# Patient Record
Sex: FEMALE | Race: BLACK OR AFRICAN AMERICAN | NOT HISPANIC OR LATINO | URBAN - METROPOLITAN AREA
[De-identification: names, ages, dates, MRNs, and addresses within clinical notes are randomized per-mention and may not be internally consistent; named-entity substitution may affect disease eponyms.]

---

## 2018-10-22 ENCOUNTER — EMERGENCY (EMERGENCY)
Facility: HOSPITAL | Age: 33
LOS: 1 days | Discharge: HOME | End: 2018-10-22
Attending: EMERGENCY MEDICINE | Admitting: EMERGENCY MEDICINE
Payer: MEDICAID

## 2018-10-22 VITALS
TEMPERATURE: 98 F | RESPIRATION RATE: 18 BRPM | HEART RATE: 60 BPM | SYSTOLIC BLOOD PRESSURE: 120 MMHG | DIASTOLIC BLOOD PRESSURE: 74 MMHG | OXYGEN SATURATION: 100 %

## 2018-10-22 LAB
APPEARANCE UR: CLEAR — SIGNIFICANT CHANGE UP
BACTERIA # UR AUTO: NEGATIVE — SIGNIFICANT CHANGE UP
BILIRUB UR-MCNC: NEGATIVE — SIGNIFICANT CHANGE UP
BLD GP AB SCN SERPL QL: NEGATIVE — SIGNIFICANT CHANGE UP
BLOOD UR QL VISUAL: SIGNIFICANT CHANGE UP
COLOR SPEC: YELLOW — SIGNIFICANT CHANGE UP
GLUCOSE UR-MCNC: NEGATIVE — SIGNIFICANT CHANGE UP
HCG SERPL-ACNC: SIGNIFICANT CHANGE UP MIU/ML
HYALINE CASTS # UR AUTO: NEGATIVE — SIGNIFICANT CHANGE UP
KETONES UR-MCNC: NEGATIVE — SIGNIFICANT CHANGE UP
LEUKOCYTE ESTERASE UR-ACNC: NEGATIVE — SIGNIFICANT CHANGE UP
NITRITE UR-MCNC: NEGATIVE — SIGNIFICANT CHANGE UP
PH UR: 6.5 — SIGNIFICANT CHANGE UP (ref 5–8)
PROT UR-MCNC: 20 — SIGNIFICANT CHANGE UP
RBC CASTS # UR COMP ASSIST: SIGNIFICANT CHANGE UP (ref 0–?)
RH IG SCN BLD-IMP: POSITIVE — SIGNIFICANT CHANGE UP
SP GR SPEC: 1.03 — SIGNIFICANT CHANGE UP (ref 1–1.04)
SQUAMOUS # UR AUTO: SIGNIFICANT CHANGE UP
UROBILINOGEN FLD QL: SIGNIFICANT CHANGE UP
WBC UR QL: SIGNIFICANT CHANGE UP (ref 0–?)

## 2018-10-22 PROCEDURE — 76815 OB US LIMITED FETUS(S): CPT | Mod: 26

## 2018-10-22 PROCEDURE — 99284 EMERGENCY DEPT VISIT MOD MDM: CPT | Mod: 25

## 2018-10-22 RX ORDER — ACETAMINOPHEN 500 MG
650 TABLET ORAL ONCE
Qty: 0 | Refills: 0 | Status: COMPLETED | OUTPATIENT
Start: 2018-10-22 | End: 2018-10-22

## 2018-10-22 RX ORDER — ACETAMINOPHEN 500 MG
1000 TABLET ORAL ONCE
Qty: 0 | Refills: 0 | Status: DISCONTINUED | OUTPATIENT
Start: 2018-10-22 | End: 2018-10-26

## 2018-10-22 RX ADMIN — Medication 650 MILLIGRAM(S): at 13:08

## 2018-10-22 NOTE — ED PROCEDURE NOTE - PROCEDURE ADDITIONAL DETAILS
focused Ed ultrasound transabdominal OB to evaluate fetal well being.   uterus scanned in two planes in gray scale and m mode  fetal heart rate measured 171  impression: IUP  jackscarlos  [id number]

## 2018-10-22 NOTE — ED PROVIDER NOTE - OBJECTIVE STATEMENT
33y F  (1 previous ) currently 8 weeks pregnant with prior confirmatory US here for vaginal bleeding this morning and mild abd cramping. No fever, chills, dizziness, weakness, or dysuria

## 2018-10-23 LAB — SPECIMEN SOURCE: SIGNIFICANT CHANGE UP

## 2018-10-24 LAB — BACTERIA UR CULT: SIGNIFICANT CHANGE UP

## 2018-10-24 NOTE — ED POST DISCHARGE NOTE - RESULT SUMMARY
culture grew 3 or more types of organims which indicate collection contamination, consider recollection only if clinically indicated. Patient pregnant. No antibiotic listed in ED provider note or prescription writer at time of discharge. Patient contact # [phone] and alt # [phone] Msg's left on both #'s with Admin # and hrs.

## 2018-11-07 ENCOUNTER — EMERGENCY (EMERGENCY)
Facility: HOSPITAL | Age: 33
LOS: 1 days | Discharge: HOME | End: 2018-11-07
Admitting: EMERGENCY MEDICINE
Payer: MEDICAID

## 2018-11-07 VITALS
DIASTOLIC BLOOD PRESSURE: 76 MMHG | SYSTOLIC BLOOD PRESSURE: 115 MMHG | TEMPERATURE: 98 F | RESPIRATION RATE: 18 BRPM | HEART RATE: 67 BPM

## 2018-11-07 VITALS
SYSTOLIC BLOOD PRESSURE: 110 MMHG | HEART RATE: 68 BPM | OXYGEN SATURATION: 100 % | DIASTOLIC BLOOD PRESSURE: 76 MMHG | RESPIRATION RATE: 16 BRPM

## 2018-11-07 LAB
ALBUMIN SERPL ELPH-MCNC: 4.2 G/DL — SIGNIFICANT CHANGE UP (ref 3.3–5)
ALP SERPL-CCNC: 33 U/L — LOW (ref 40–120)
ALT FLD-CCNC: 15 U/L — SIGNIFICANT CHANGE UP (ref 4–33)
APPEARANCE UR: CLEAR — SIGNIFICANT CHANGE UP
AST SERPL-CCNC: 37 U/L — HIGH (ref 4–32)
BACTERIA # UR AUTO: SIGNIFICANT CHANGE UP
BASOPHILS # BLD AUTO: 0.03 K/UL — SIGNIFICANT CHANGE UP (ref 0–0.2)
BASOPHILS NFR BLD AUTO: 0.3 % — SIGNIFICANT CHANGE UP (ref 0–2)
BILIRUB SERPL-MCNC: 0.4 MG/DL — SIGNIFICANT CHANGE UP (ref 0.2–1.2)
BILIRUB UR-MCNC: NEGATIVE — SIGNIFICANT CHANGE UP
BLD GP AB SCN SERPL QL: NEGATIVE — SIGNIFICANT CHANGE UP
BLOOD UR QL VISUAL: NEGATIVE — SIGNIFICANT CHANGE UP
BUN SERPL-MCNC: 8 MG/DL — SIGNIFICANT CHANGE UP (ref 7–23)
CALCIUM SERPL-MCNC: 9.5 MG/DL — SIGNIFICANT CHANGE UP (ref 8.4–10.5)
CHLORIDE SERPL-SCNC: 103 MMOL/L — SIGNIFICANT CHANGE UP (ref 98–107)
CO2 SERPL-SCNC: 20 MMOL/L — LOW (ref 22–31)
COD CRY URNS QL: SIGNIFICANT CHANGE UP (ref 0–0)
COLOR SPEC: YELLOW — SIGNIFICANT CHANGE UP
CREAT SERPL-MCNC: 0.7 MG/DL — SIGNIFICANT CHANGE UP (ref 0.5–1.3)
EOSINOPHIL # BLD AUTO: 0.16 K/UL — SIGNIFICANT CHANGE UP (ref 0–0.5)
EOSINOPHIL NFR BLD AUTO: 1.8 % — SIGNIFICANT CHANGE UP (ref 0–6)
GLUCOSE SERPL-MCNC: 102 MG/DL — HIGH (ref 70–99)
GLUCOSE UR-MCNC: NEGATIVE — SIGNIFICANT CHANGE UP
HCG SERPL-ACNC: SIGNIFICANT CHANGE UP MIU/ML
HCT VFR BLD CALC: 39.7 % — SIGNIFICANT CHANGE UP (ref 34.5–45)
HGB BLD-MCNC: 13.3 G/DL — SIGNIFICANT CHANGE UP (ref 11.5–15.5)
HYALINE CASTS # UR AUTO: NEGATIVE — SIGNIFICANT CHANGE UP
IMM GRANULOCYTES # BLD AUTO: 0.04 # — SIGNIFICANT CHANGE UP
IMM GRANULOCYTES NFR BLD AUTO: 0.4 % — SIGNIFICANT CHANGE UP (ref 0–1.5)
KETONES UR-MCNC: NEGATIVE — SIGNIFICANT CHANGE UP
LEUKOCYTE ESTERASE UR-ACNC: NEGATIVE — SIGNIFICANT CHANGE UP
LYMPHOCYTES # BLD AUTO: 1.71 K/UL — SIGNIFICANT CHANGE UP (ref 1–3.3)
LYMPHOCYTES # BLD AUTO: 19.2 % — SIGNIFICANT CHANGE UP (ref 13–44)
MCHC RBC-ENTMCNC: 28.4 PG — SIGNIFICANT CHANGE UP (ref 27–34)
MCHC RBC-ENTMCNC: 33.5 % — SIGNIFICANT CHANGE UP (ref 32–36)
MCV RBC AUTO: 84.8 FL — SIGNIFICANT CHANGE UP (ref 80–100)
MONOCYTES # BLD AUTO: 0.58 K/UL — SIGNIFICANT CHANGE UP (ref 0–0.9)
MONOCYTES NFR BLD AUTO: 6.5 % — SIGNIFICANT CHANGE UP (ref 2–14)
MUCOUS THREADS # UR AUTO: SIGNIFICANT CHANGE UP
NEUTROPHILS # BLD AUTO: 6.39 K/UL — SIGNIFICANT CHANGE UP (ref 1.8–7.4)
NEUTROPHILS NFR BLD AUTO: 71.8 % — SIGNIFICANT CHANGE UP (ref 43–77)
NITRITE UR-MCNC: NEGATIVE — SIGNIFICANT CHANGE UP
NRBC # FLD: 0 — SIGNIFICANT CHANGE UP
PH UR: 6 — SIGNIFICANT CHANGE UP (ref 5–8)
PLATELET # BLD AUTO: 272 K/UL — SIGNIFICANT CHANGE UP (ref 150–400)
PMV BLD: 10.3 FL — SIGNIFICANT CHANGE UP (ref 7–13)
POTASSIUM SERPL-MCNC: 5.5 MMOL/L — HIGH (ref 3.5–5.3)
POTASSIUM SERPL-SCNC: 5.5 MMOL/L — HIGH (ref 3.5–5.3)
PROT SERPL-MCNC: 7.3 G/DL — SIGNIFICANT CHANGE UP (ref 6–8.3)
PROT UR-MCNC: 20 — SIGNIFICANT CHANGE UP
RBC # BLD: 4.68 M/UL — SIGNIFICANT CHANGE UP (ref 3.8–5.2)
RBC # FLD: 14.4 % — SIGNIFICANT CHANGE UP (ref 10.3–14.5)
RBC CASTS # UR COMP ASSIST: SIGNIFICANT CHANGE UP (ref 0–?)
RH IG SCN BLD-IMP: POSITIVE — SIGNIFICANT CHANGE UP
SODIUM SERPL-SCNC: 136 MMOL/L — SIGNIFICANT CHANGE UP (ref 135–145)
SP GR SPEC: 1.03 — SIGNIFICANT CHANGE UP (ref 1–1.04)
SQUAMOUS # UR AUTO: SIGNIFICANT CHANGE UP
UROBILINOGEN FLD QL: SIGNIFICANT CHANGE UP
WBC # BLD: 8.91 K/UL — SIGNIFICANT CHANGE UP (ref 3.8–10.5)
WBC # FLD AUTO: 8.91 K/UL — SIGNIFICANT CHANGE UP (ref 3.8–10.5)
WBC UR QL: SIGNIFICANT CHANGE UP (ref 0–?)

## 2018-11-07 PROCEDURE — 99284 EMERGENCY DEPT VISIT MOD MDM: CPT

## 2018-11-07 PROCEDURE — 76830 TRANSVAGINAL US NON-OB: CPT | Mod: 26

## 2018-11-07 NOTE — ED PROVIDER NOTE - MEDICAL DECISION MAKING DETAILS
32 yo F here for vaginal bleeding in pregnancy. otherwise without complaints. bleeding now resolved. PLAN: labs, urine, type, TVUS

## 2018-11-07 NOTE — ED ADULT NURSE NOTE - OBJECTIVE STATEMENT
break coverage RN- pt  10wks 5 days c/o intermittent vaginal bleeding x2 days- pt currently vitally stable, awake, a/ox3 in NAD, IV 20g placed to right AC, labs/urine sent, US completed, pending results- MD at bedside, awaiting further orders from MD, will continue to monitor, pt safety maintained.

## 2018-11-07 NOTE — ED PROVIDER NOTE - PROGRESS NOTE DETAILS
EVER Peng: pt doing well, TVUS with confirmed IUP, will dc home with supportive care and obgyn fu EVER Peng: pt doing well, TVUS with confirmed IUP with subchorionic hematoma, will dc home with supportive care and obgyn fu

## 2018-11-07 NOTE — ED ADULT NURSE NOTE - NSIMPLEMENTINTERV_GEN_ALL_ED
Implemented All Universal Safety Interventions:  [place] to call system. Call bell, personal items and telephone within reach. Instruct patient to call for assistance. Room bathroom lighting operational. Non-slip footwear when patient is off stretcher. Physically safe environment: no spills, clutter or unnecessary equipment. Stretcher in lowest position, wheels locked, appropriate side rails in place.

## 2018-11-07 NOTE — ED PROVIDER NOTE - OBJECTIVE STATEMENT
32 yo F  currently 10 weeks 5 days pregnant with confirmed IUP here for vaginal bleeding x 2 days. pt reports over the past 2 days has on and off vaginal bleeding. Went to GYN Dr. Reyes on day 1 of bleeding and was told there was no bleeding and sent home. Reports this morning when stood up from bed noted again bleeding, sat on toilet for 30 minutes and had mild bleeding which then resolved. Mild cramping to pelvic area. denies fever chills vomiting diarrhea cp sob weakness vaginal discharge dysuria hematuria.

## 2018-12-17 ENCOUNTER — EMERGENCY (EMERGENCY)
Facility: HOSPITAL | Age: 33
LOS: 1 days | Discharge: STILL ADMITTED OR ADMINISTRATIVE | End: 2018-12-17
Admitting: EMERGENCY MEDICINE

## 2018-12-17 ENCOUNTER — OUTPATIENT (OUTPATIENT)
Dept: OUTPATIENT SERVICES | Facility: HOSPITAL | Age: 33
LOS: 1 days | End: 2018-12-17
Payer: MEDICAID

## 2018-12-17 VITALS — DIASTOLIC BLOOD PRESSURE: 65 MMHG | HEART RATE: 65 BPM | SYSTOLIC BLOOD PRESSURE: 103 MMHG

## 2018-12-17 VITALS
DIASTOLIC BLOOD PRESSURE: 70 MMHG | RESPIRATION RATE: 20 BRPM | OXYGEN SATURATION: 100 % | TEMPERATURE: 98 F | HEART RATE: 67 BPM | SYSTOLIC BLOOD PRESSURE: 111 MMHG

## 2018-12-17 DIAGNOSIS — O26.899 OTHER SPECIFIED PREGNANCY RELATED CONDITIONS, UNSPECIFIED TRIMESTER: ICD-10-CM

## 2018-12-17 DIAGNOSIS — Z3A.00 WEEKS OF GESTATION OF PREGNANCY NOT SPECIFIED: ICD-10-CM

## 2018-12-17 LAB
APPEARANCE UR: CLEAR — SIGNIFICANT CHANGE UP
BACTERIA # UR AUTO: NEGATIVE — SIGNIFICANT CHANGE UP
BILIRUB UR-MCNC: NEGATIVE — SIGNIFICANT CHANGE UP
BLOOD UR QL VISUAL: NEGATIVE — SIGNIFICANT CHANGE UP
COLOR SPEC: YELLOW — SIGNIFICANT CHANGE UP
GLUCOSE UR-MCNC: NEGATIVE — SIGNIFICANT CHANGE UP
HYALINE CASTS # UR AUTO: HIGH
KETONES UR-MCNC: NEGATIVE — SIGNIFICANT CHANGE UP
LEUKOCYTE ESTERASE UR-ACNC: SIGNIFICANT CHANGE UP
NITRITE UR-MCNC: NEGATIVE — SIGNIFICANT CHANGE UP
PH UR: 6.5 — SIGNIFICANT CHANGE UP (ref 5–8)
PROT UR-MCNC: 10 — SIGNIFICANT CHANGE UP
RBC CASTS # UR COMP ASSIST: SIGNIFICANT CHANGE UP (ref 0–?)
SP GR SPEC: 1.03 — SIGNIFICANT CHANGE UP (ref 1–1.04)
SQUAMOUS # UR AUTO: SIGNIFICANT CHANGE UP
UROBILINOGEN FLD QL: SIGNIFICANT CHANGE UP
WBC UR QL: >50 — HIGH (ref 0–?)

## 2018-12-17 PROCEDURE — 76815 OB US LIMITED FETUS(S): CPT | Mod: 26

## 2018-12-17 PROCEDURE — 99213 OFFICE O/P EST LOW 20 MIN: CPT | Mod: 25

## 2018-12-17 PROCEDURE — 76817 TRANSVAGINAL US OBSTETRIC: CPT | Mod: 26

## 2018-12-17 NOTE — ED ADULT TRIAGE NOTE - CHIEF COMPLAINT QUOTE
, pt reports due day is May 31st OBGYN is Dr. Bradley. pt reports LMQ abd pain denies DC denies urinary s/s.

## 2018-12-20 LAB
BACTERIA UR CULT: SIGNIFICANT CHANGE UP
SPECIMEN SOURCE: SIGNIFICANT CHANGE UP

## 2019-01-03 PROBLEM — Z00.00 ENCOUNTER FOR PREVENTIVE HEALTH EXAMINATION: Status: ACTIVE | Noted: 2019-01-03

## 2019-01-16 ENCOUNTER — APPOINTMENT (OUTPATIENT)
Dept: ANTEPARTUM | Facility: CLINIC | Age: 34
End: 2019-01-16
Payer: MEDICAID

## 2019-01-16 ENCOUNTER — ASOB RESULT (OUTPATIENT)
Age: 34
End: 2019-01-16

## 2019-01-16 PROCEDURE — 76811 OB US DETAILED SNGL FETUS: CPT

## 2019-01-29 ENCOUNTER — ASOB RESULT (OUTPATIENT)
Age: 34
End: 2019-01-29

## 2019-01-29 ENCOUNTER — APPOINTMENT (OUTPATIENT)
Dept: ANTEPARTUM | Facility: CLINIC | Age: 34
End: 2019-01-29
Payer: MEDICAID

## 2019-01-29 PROCEDURE — 76816 OB US FOLLOW-UP PER FETUS: CPT

## 2019-02-06 ENCOUNTER — ASOB RESULT (OUTPATIENT)
Age: 34
End: 2019-02-06

## 2019-02-06 ENCOUNTER — EMERGENCY (EMERGENCY)
Facility: HOSPITAL | Age: 34
LOS: 1 days | Discharge: STILL ADMITTED OR ADMINISTRATIVE | End: 2019-02-06
Admitting: EMERGENCY MEDICINE

## 2019-02-06 ENCOUNTER — APPOINTMENT (OUTPATIENT)
Dept: ANTEPARTUM | Facility: HOSPITAL | Age: 34
End: 2019-02-06

## 2019-02-06 ENCOUNTER — OUTPATIENT (OUTPATIENT)
Dept: OUTPATIENT SERVICES | Facility: HOSPITAL | Age: 34
LOS: 1 days | End: 2019-02-06
Payer: MEDICAID

## 2019-02-06 VITALS
TEMPERATURE: 98 F | RESPIRATION RATE: 18 BRPM | SYSTOLIC BLOOD PRESSURE: 107 MMHG | OXYGEN SATURATION: 100 % | HEART RATE: 78 BPM | DIASTOLIC BLOOD PRESSURE: 59 MMHG

## 2019-02-06 DIAGNOSIS — O26.899 OTHER SPECIFIED PREGNANCY RELATED CONDITIONS, UNSPECIFIED TRIMESTER: ICD-10-CM

## 2019-02-06 DIAGNOSIS — Z3A.00 WEEKS OF GESTATION OF PREGNANCY NOT SPECIFIED: ICD-10-CM

## 2019-02-06 LAB
ALBUMIN SERPL ELPH-MCNC: 3.8 G/DL — SIGNIFICANT CHANGE UP (ref 3.3–5)
ALP SERPL-CCNC: 69 U/L — SIGNIFICANT CHANGE UP (ref 40–120)
ALT FLD-CCNC: 10 U/L — SIGNIFICANT CHANGE UP (ref 4–33)
AMYLASE P1 CFR SERPL: 53 U/L — SIGNIFICANT CHANGE UP (ref 25–125)
ANION GAP SERPL CALC-SCNC: 12 MMO/L — SIGNIFICANT CHANGE UP (ref 7–14)
APPEARANCE UR: CLEAR — SIGNIFICANT CHANGE UP
AST SERPL-CCNC: 19 U/L — SIGNIFICANT CHANGE UP (ref 4–32)
BASOPHILS # BLD AUTO: 0.02 K/UL — SIGNIFICANT CHANGE UP (ref 0–0.2)
BASOPHILS NFR BLD AUTO: 0.2 % — SIGNIFICANT CHANGE UP (ref 0–2)
BILIRUB SERPL-MCNC: 0.5 MG/DL — SIGNIFICANT CHANGE UP (ref 0.2–1.2)
BILIRUB UR-MCNC: NEGATIVE — SIGNIFICANT CHANGE UP
BLOOD UR QL VISUAL: NEGATIVE — SIGNIFICANT CHANGE UP
BUN SERPL-MCNC: 5 MG/DL — LOW (ref 7–23)
CALCIUM SERPL-MCNC: 9.2 MG/DL — SIGNIFICANT CHANGE UP (ref 8.4–10.5)
CHLORIDE SERPL-SCNC: 101 MMOL/L — SIGNIFICANT CHANGE UP (ref 98–107)
CO2 SERPL-SCNC: 21 MMOL/L — LOW (ref 22–31)
COLOR SPEC: SIGNIFICANT CHANGE UP
CREAT SERPL-MCNC: 0.62 MG/DL — SIGNIFICANT CHANGE UP (ref 0.5–1.3)
EOSINOPHIL # BLD AUTO: 0.05 K/UL — SIGNIFICANT CHANGE UP (ref 0–0.5)
EOSINOPHIL NFR BLD AUTO: 0.5 % — SIGNIFICANT CHANGE UP (ref 0–6)
GLUCOSE SERPL-MCNC: 65 MG/DL — LOW (ref 70–99)
GLUCOSE UR-MCNC: NEGATIVE — SIGNIFICANT CHANGE UP
HCT VFR BLD CALC: 40.2 % — SIGNIFICANT CHANGE UP (ref 34.5–45)
HGB BLD-MCNC: 13.2 G/DL — SIGNIFICANT CHANGE UP (ref 11.5–15.5)
IMM GRANULOCYTES NFR BLD AUTO: 0.5 % — SIGNIFICANT CHANGE UP (ref 0–1.5)
KETONES UR-MCNC: HIGH
LEUKOCYTE ESTERASE UR-ACNC: NEGATIVE — SIGNIFICANT CHANGE UP
LIDOCAIN IGE QN: 14 U/L — SIGNIFICANT CHANGE UP (ref 7–60)
LYMPHOCYTES # BLD AUTO: 1.52 K/UL — SIGNIFICANT CHANGE UP (ref 1–3.3)
LYMPHOCYTES # BLD AUTO: 16.4 % — SIGNIFICANT CHANGE UP (ref 13–44)
MCHC RBC-ENTMCNC: 28.9 PG — SIGNIFICANT CHANGE UP (ref 27–34)
MCHC RBC-ENTMCNC: 32.8 % — SIGNIFICANT CHANGE UP (ref 32–36)
MCV RBC AUTO: 88.2 FL — SIGNIFICANT CHANGE UP (ref 80–100)
MONOCYTES # BLD AUTO: 0.76 K/UL — SIGNIFICANT CHANGE UP (ref 0–0.9)
MONOCYTES NFR BLD AUTO: 8.2 % — SIGNIFICANT CHANGE UP (ref 2–14)
NEUTROPHILS # BLD AUTO: 6.88 K/UL — SIGNIFICANT CHANGE UP (ref 1.8–7.4)
NEUTROPHILS NFR BLD AUTO: 74.2 % — SIGNIFICANT CHANGE UP (ref 43–77)
NITRITE UR-MCNC: NEGATIVE — SIGNIFICANT CHANGE UP
NRBC # FLD: 0 K/UL — LOW (ref 25–125)
PH UR: 7 — SIGNIFICANT CHANGE UP (ref 5–8)
PLATELET # BLD AUTO: 252 K/UL — SIGNIFICANT CHANGE UP (ref 150–400)
PMV BLD: 10.8 FL — SIGNIFICANT CHANGE UP (ref 7–13)
POTASSIUM SERPL-MCNC: 4.2 MMOL/L — SIGNIFICANT CHANGE UP (ref 3.5–5.3)
POTASSIUM SERPL-SCNC: 4.2 MMOL/L — SIGNIFICANT CHANGE UP (ref 3.5–5.3)
PROT SERPL-MCNC: 6.9 G/DL — SIGNIFICANT CHANGE UP (ref 6–8.3)
PROT UR-MCNC: 10 — SIGNIFICANT CHANGE UP
RBC # BLD: 4.56 M/UL — SIGNIFICANT CHANGE UP (ref 3.8–5.2)
RBC # FLD: 13.1 % — SIGNIFICANT CHANGE UP (ref 10.3–14.5)
SODIUM SERPL-SCNC: 134 MMOL/L — LOW (ref 135–145)
SP GR SPEC: 1.02 — SIGNIFICANT CHANGE UP (ref 1–1.04)
UROBILINOGEN FLD QL: NORMAL — SIGNIFICANT CHANGE UP
WBC # BLD: 9.28 K/UL — SIGNIFICANT CHANGE UP (ref 3.8–10.5)
WBC # FLD AUTO: 9.28 K/UL — SIGNIFICANT CHANGE UP (ref 3.8–10.5)

## 2019-02-06 PROCEDURE — 76705 ECHO EXAM OF ABDOMEN: CPT | Mod: 26

## 2019-02-06 PROCEDURE — 99213 OFFICE O/P EST LOW 20 MIN: CPT | Mod: 25

## 2019-02-06 PROCEDURE — 76830 TRANSVAGINAL US NON-OB: CPT | Mod: 26

## 2019-02-06 RX ORDER — SODIUM CHLORIDE 9 MG/ML
3 INJECTION INTRAMUSCULAR; INTRAVENOUS; SUBCUTANEOUS ONCE
Qty: 0 | Refills: 0 | Status: DISCONTINUED | OUTPATIENT
Start: 2019-02-06 | End: 2019-02-21

## 2019-02-06 RX ORDER — SODIUM CHLORIDE 9 MG/ML
500 INJECTION, SOLUTION INTRAVENOUS ONCE
Qty: 0 | Refills: 0 | Status: DISCONTINUED | OUTPATIENT
Start: 2019-02-06 | End: 2019-02-21

## 2019-02-06 NOTE — ED ADULT TRIAGE NOTE - CHIEF COMPLAINT QUOTE
Pt is 6 months pregnant reports lower abdominal pain  that radiates to lower buttocks since last night. Pt states due date may 31

## 2019-03-06 ENCOUNTER — EMERGENCY (EMERGENCY)
Facility: HOSPITAL | Age: 34
LOS: 1 days | Discharge: HOME | End: 2019-03-06
Attending: EMERGENCY MEDICINE | Admitting: EMERGENCY MEDICINE
Payer: MEDICAID

## 2019-03-06 VITALS
SYSTOLIC BLOOD PRESSURE: 117 MMHG | HEART RATE: 83 BPM | RESPIRATION RATE: 22 BRPM | TEMPERATURE: 98 F | DIASTOLIC BLOOD PRESSURE: 76 MMHG | OXYGEN SATURATION: 96 %

## 2019-03-06 VITALS
HEART RATE: 67 BPM | SYSTOLIC BLOOD PRESSURE: 108 MMHG | TEMPERATURE: 99 F | OXYGEN SATURATION: 100 % | RESPIRATION RATE: 18 BRPM | DIASTOLIC BLOOD PRESSURE: 65 MMHG

## 2019-03-06 LAB
ALBUMIN SERPL ELPH-MCNC: 3.5 G/DL — SIGNIFICANT CHANGE UP (ref 3.3–5)
ALP SERPL-CCNC: 80 U/L — SIGNIFICANT CHANGE UP (ref 40–120)
ALT FLD-CCNC: 11 U/L — SIGNIFICANT CHANGE UP (ref 4–33)
ANION GAP SERPL CALC-SCNC: 14 MMO/L — SIGNIFICANT CHANGE UP (ref 7–14)
AST SERPL-CCNC: 17 U/L — SIGNIFICANT CHANGE UP (ref 4–32)
BASOPHILS # BLD AUTO: 0.03 K/UL — SIGNIFICANT CHANGE UP (ref 0–0.2)
BASOPHILS NFR BLD AUTO: 0.4 % — SIGNIFICANT CHANGE UP (ref 0–2)
BILIRUB SERPL-MCNC: 0.3 MG/DL — SIGNIFICANT CHANGE UP (ref 0.2–1.2)
BUN SERPL-MCNC: 4 MG/DL — LOW (ref 7–23)
CALCIUM SERPL-MCNC: 8.7 MG/DL — SIGNIFICANT CHANGE UP (ref 8.4–10.5)
CHLORIDE SERPL-SCNC: 102 MMOL/L — SIGNIFICANT CHANGE UP (ref 98–107)
CO2 SERPL-SCNC: 22 MMOL/L — SIGNIFICANT CHANGE UP (ref 22–31)
CREAT SERPL-MCNC: 0.62 MG/DL — SIGNIFICANT CHANGE UP (ref 0.5–1.3)
D DIMER BLD IA.RAPID-MCNC: 248 NG/ML — SIGNIFICANT CHANGE UP
EOSINOPHIL # BLD AUTO: 0.13 K/UL — SIGNIFICANT CHANGE UP (ref 0–0.5)
EOSINOPHIL NFR BLD AUTO: 1.6 % — SIGNIFICANT CHANGE UP (ref 0–6)
GLUCOSE SERPL-MCNC: 99 MG/DL — SIGNIFICANT CHANGE UP (ref 70–99)
HCT VFR BLD CALC: 34.7 % — SIGNIFICANT CHANGE UP (ref 34.5–45)
HGB BLD-MCNC: 11.2 G/DL — LOW (ref 11.5–15.5)
IMM GRANULOCYTES NFR BLD AUTO: 0.6 % — SIGNIFICANT CHANGE UP (ref 0–1.5)
LYMPHOCYTES # BLD AUTO: 1.8 K/UL — SIGNIFICANT CHANGE UP (ref 1–3.3)
LYMPHOCYTES # BLD AUTO: 22.4 % — SIGNIFICANT CHANGE UP (ref 13–44)
MCHC RBC-ENTMCNC: 27.7 PG — SIGNIFICANT CHANGE UP (ref 27–34)
MCHC RBC-ENTMCNC: 32.3 % — SIGNIFICANT CHANGE UP (ref 32–36)
MCV RBC AUTO: 85.9 FL — SIGNIFICANT CHANGE UP (ref 80–100)
MONOCYTES # BLD AUTO: 0.77 K/UL — SIGNIFICANT CHANGE UP (ref 0–0.9)
MONOCYTES NFR BLD AUTO: 9.6 % — SIGNIFICANT CHANGE UP (ref 2–14)
NEUTROPHILS # BLD AUTO: 5.25 K/UL — SIGNIFICANT CHANGE UP (ref 1.8–7.4)
NEUTROPHILS NFR BLD AUTO: 65.4 % — SIGNIFICANT CHANGE UP (ref 43–77)
NRBC # FLD: 0 K/UL — LOW (ref 25–125)
PLATELET # BLD AUTO: 282 K/UL — SIGNIFICANT CHANGE UP (ref 150–400)
PMV BLD: 11.1 FL — SIGNIFICANT CHANGE UP (ref 7–13)
POTASSIUM SERPL-MCNC: 3.9 MMOL/L — SIGNIFICANT CHANGE UP (ref 3.5–5.3)
POTASSIUM SERPL-SCNC: 3.9 MMOL/L — SIGNIFICANT CHANGE UP (ref 3.5–5.3)
PROT SERPL-MCNC: 6.5 G/DL — SIGNIFICANT CHANGE UP (ref 6–8.3)
RBC # BLD: 4.04 M/UL — SIGNIFICANT CHANGE UP (ref 3.8–5.2)
RBC # FLD: 12.8 % — SIGNIFICANT CHANGE UP (ref 10.3–14.5)
SODIUM SERPL-SCNC: 138 MMOL/L — SIGNIFICANT CHANGE UP (ref 135–145)
WBC # BLD: 8.03 K/UL — SIGNIFICANT CHANGE UP (ref 3.8–10.5)
WBC # FLD AUTO: 8.03 K/UL — SIGNIFICANT CHANGE UP (ref 3.8–10.5)

## 2019-03-06 PROCEDURE — 99284 EMERGENCY DEPT VISIT MOD MDM: CPT

## 2019-03-06 PROCEDURE — 93971 EXTREMITY STUDY: CPT | Mod: 26,LT

## 2019-03-06 RX ORDER — LIDOCAINE 4 G/100G
1 CREAM TOPICAL ONCE
Qty: 0 | Refills: 0 | Status: COMPLETED | OUTPATIENT
Start: 2019-03-06 | End: 2019-03-06

## 2019-03-06 RX ORDER — ACETAMINOPHEN 500 MG
1000 TABLET ORAL ONCE
Qty: 0 | Refills: 0 | Status: COMPLETED | OUTPATIENT
Start: 2019-03-06 | End: 2019-03-06

## 2019-03-06 RX ADMIN — Medication 400 MILLIGRAM(S): at 20:59

## 2019-03-06 RX ADMIN — Medication 1000 MILLIGRAM(S): at 21:30

## 2019-03-06 RX ADMIN — LIDOCAINE 1 PATCH: 4 CREAM TOPICAL at 22:08

## 2019-03-06 RX ADMIN — Medication 1000 MILLIGRAM(S): at 21:15

## 2019-03-06 NOTE — ED PROVIDER NOTE - ATTENDING CONTRIBUTION TO CARE
agree with resident note  "34 yo female , 27 wks, presents with Right calf, thigh pain and SOB x 4 days. Pain is sharp shooting, radiates to butt, worse with movement, associated with paresthesia of the foot. "  Denies CP.  States chronic SOB with this pregnancy and no worsening.  States movement makes worse.  Denies bowel or bladder incontinence is able to walk.    PE: well appearing; VSS; CTAB/L; s1 s2 no m/r/g abd: gravid uterus; + SLR; sensation intact neuro: no saddle anesthesia; 5/5 motor UE and LE    Imp: sciatic pain; will treat with topical lidocaine, tylenol

## 2019-03-06 NOTE — ED ADULT TRIAGE NOTE - CHIEF COMPLAINT QUOTE
patient states" my right thigh is swollen and my right calf hurts a lot and I am 27 weeks pregnant with some SOB".

## 2019-03-06 NOTE — ED PROVIDER NOTE - CLINICAL SUMMARY MEDICAL DECISION MAKING FREE TEXT BOX
32 yo female , 27 weeks pregnant, pw with thigh, knee pain and SOB. + straight leg raise right. More likely sciatic pain due to pregnancy, but will r/o DVT and PE with dimer. 32 yo female , 27 weeks pregnant, pw with thigh, knee pain and SOB. + straight leg raise right. More likely sciatic pain due to pregnancy, but will r/o DVT and PE with dimer and US. Likely D/C home.

## 2019-03-06 NOTE — ED PROVIDER NOTE - OBJECTIVE STATEMENT
32 yo female , 27 wks, presents with Right calf, thigh pain and SOB x 4 days. Pain is sharp shooting, radiates to butt, worse with movement, associated with paresthesia of the foot. Mild SOB with exertion, walking up stairs, none at rest, mild dry cough started today. Denies CP, headache, urinary/bowel retention or incontinence, saddle anesthesia, urinary symptoms, diarrhea, fever chills. No hemoptysis, recent travel, hx of clots or back pain.

## 2019-03-06 NOTE — ED PROVIDER NOTE - NSFOLLOWUPINSTRUCTIONS_ED_ALL_ED_FT
You were seen in the Emergency Department (ED) for leg and calf pain and shortness of breath when going up stairs. You did not have a clot in your legs. Your D-dimer is slightly elevated, but you declined to have a CTA due to concern of radiation.     Please take over the counter Tylenol and Lidoderm patch by the packaging insert for your pain.   Please follow up with your OB doctor Dr. Polo in the next 72 hours.     Please return to the ED if you experience any new or concerning symptoms, including, but not limited to: chest pain, difficulty breathing, inability to feel or move part of your body, passing out. fever, chills.     Thank you for choosing a [facility] ED.

## 2019-03-06 NOTE — ED ADULT NURSE NOTE - NSIMPLEMENTINTERV_GEN_ALL_ED
Implemented All Fall Risk Interventions:  [place] to call system. Call bell, personal items and telephone within reach. Instruct patient to call for assistance. Room bathroom lighting operational. Non-slip footwear when patient is off stretcher. Physically safe environment: no spills, clutter or unnecessary equipment. Stretcher in lowest position, wheels locked, appropriate side rails in place. Provide visual cue, wrist band, yellow gown, etc. Monitor gait and stability. Monitor for mental status changes and reorient to person, place, and time. Review medications for side effects contributing to fall risk. Reinforce activity limits and safety measures with patient and family.

## 2019-03-06 NOTE — ED PROVIDER NOTE - NS ED ROS FT
GENERAL: No fever, chills  EYES: no vision changes, no discharge.   HEENT: no difficulty  swallowing or speaking   CARDIAC: no chest pain/pressure, no lower ex edema  PULMONARY: + cough, +SOB  GI: no abdominal pain, n/v/d/c  : no dysuria, frequency, hematuria  SKIN: no rashes, lesions, vesicles  NEURO: no headache, lightheadedness, + paraesthesias right foot  MSK: + calf pain, hip pain, butt pain, no myalgia, weakness.

## 2019-03-06 NOTE — ED PROVIDER NOTE - PHYSICAL EXAMINATION
General: Patient awake alert NAD.   HEENT: normocephalic, atraumatic, EMOI, neck supple, no JVD  Cardiac: RRR, S1, S2, no murmur, rubs, gallop, equal bilateral radial pulses  LUNGS: CTA B/L no wheeze, rhonchi, rales.   Abdomen: + gravid abdomen. soft NT, no rebound no guarding, no CVA tenderness.   EXT: + straight leg raise R at 30 degreesm, + right calf tender to palpation.    Neuro: A&Ox3 no focal neurological deficits, CN 2-12 grossly intact  Skin: warm, dry, no rash, no lesions

## 2019-03-06 NOTE — ED PROVIDER NOTE - PROGRESS NOTE DETAILS
Elizabeth Rosas M.D. Resident  fetal heart rate 150 Elizabeth Rosas M.D. Resident  Patient sleeping comfortably in bed with her mother Isabell Venegas. Elevated dimer of 248 (nl 230) and negative DVT study, and next step for CTA r/o PE discussed with pt and her mother, both declined study due to concern for radiation to fetus. Pt and mother verbalized understanding to immediately return to ED for any concerning symptoms, including SOB at rest, CP, change in mental status. Elizabeth Rosas M.D. Resident  The patient was re-examined after interventions and feels better. Vitals signs are within normal limits, pt is at baseline mental status, ambulating, hungry and asking for food, and pain is tolerable. Patient was given verbal and written discharge instructions and return precautions, and a hard copy of all results from tests completed in the ED. Pt verbalized understanding and agreed to outpatient follow up with OB Dr. Polo.

## 2019-03-06 NOTE — ED ADULT NURSE REASSESSMENT NOTE - NS ED NURSE REASSESS COMMENT FT1
Report received from MCKAY Padgett. Pt. received A&Ox4, ambulatory. 20 gauge IV inserted in right ac by MCKAY Rosado. Awaiting lab results. Respirations are even and unlabored on room air. Will continue to monitor.

## 2019-03-17 ENCOUNTER — OUTPATIENT (OUTPATIENT)
Dept: OUTPATIENT SERVICES | Facility: HOSPITAL | Age: 34
LOS: 1 days | End: 2019-03-17
Payer: MEDICAID

## 2019-03-17 DIAGNOSIS — O26.899 OTHER SPECIFIED PREGNANCY RELATED CONDITIONS, UNSPECIFIED TRIMESTER: ICD-10-CM

## 2019-03-17 DIAGNOSIS — Z3A.00 WEEKS OF GESTATION OF PREGNANCY NOT SPECIFIED: ICD-10-CM

## 2019-03-17 LAB
APPEARANCE UR: SIGNIFICANT CHANGE UP
BACTERIA # UR AUTO: NEGATIVE — SIGNIFICANT CHANGE UP
BILIRUB UR-MCNC: NEGATIVE — SIGNIFICANT CHANGE UP
BLOOD UR QL VISUAL: NEGATIVE — SIGNIFICANT CHANGE UP
COLOR SPEC: YELLOW — SIGNIFICANT CHANGE UP
GLUCOSE UR-MCNC: NEGATIVE — SIGNIFICANT CHANGE UP
HYALINE CASTS # UR AUTO: NEGATIVE — SIGNIFICANT CHANGE UP
KETONES UR-MCNC: NEGATIVE — SIGNIFICANT CHANGE UP
LEUKOCYTE ESTERASE UR-ACNC: NEGATIVE — SIGNIFICANT CHANGE UP
NITRITE UR-MCNC: NEGATIVE — SIGNIFICANT CHANGE UP
PH UR: 7.5 — SIGNIFICANT CHANGE UP (ref 5–8)
PROT UR-MCNC: 10 — SIGNIFICANT CHANGE UP
RBC CASTS # UR COMP ASSIST: SIGNIFICANT CHANGE UP (ref 0–?)
SP GR SPEC: 1.02 — SIGNIFICANT CHANGE UP (ref 1–1.04)
SQUAMOUS # UR AUTO: SIGNIFICANT CHANGE UP
UROBILINOGEN FLD QL: SIGNIFICANT CHANGE UP
WBC UR QL: SIGNIFICANT CHANGE UP (ref 0–?)

## 2019-03-17 PROCEDURE — 59025 FETAL NON-STRESS TEST: CPT | Mod: 26

## 2019-03-17 RX ORDER — ACETAMINOPHEN 500 MG
975 TABLET ORAL ONCE
Qty: 0 | Refills: 0 | Status: COMPLETED | OUTPATIENT
Start: 2019-03-17 | End: 2019-03-17

## 2019-03-17 RX ADMIN — Medication 975 MILLIGRAM(S): at 22:56

## 2019-04-07 ENCOUNTER — OUTPATIENT (OUTPATIENT)
Dept: INPATIENT UNIT | Facility: HOSPITAL | Age: 34
LOS: 1 days | Discharge: HOME | End: 2019-04-07
Payer: MEDICAID

## 2019-04-07 VITALS — HEART RATE: 82 BPM | DIASTOLIC BLOOD PRESSURE: 74 MMHG | SYSTOLIC BLOOD PRESSURE: 110 MMHG

## 2019-04-07 DIAGNOSIS — O26.899 OTHER SPECIFIED PREGNANCY RELATED CONDITIONS, UNSPECIFIED TRIMESTER: ICD-10-CM

## 2019-04-07 DIAGNOSIS — Z3A.00 WEEKS OF GESTATION OF PREGNANCY NOT SPECIFIED: ICD-10-CM

## 2019-04-07 DIAGNOSIS — Z33.2 ENCOUNTER FOR ELECTIVE TERMINATION OF PREGNANCY: Chronic | ICD-10-CM

## 2019-04-07 LAB
APPEARANCE UR: SIGNIFICANT CHANGE UP
BACTERIA # UR AUTO: SIGNIFICANT CHANGE UP
BILIRUB UR-MCNC: NEGATIVE — SIGNIFICANT CHANGE UP
BLOOD UR QL VISUAL: NEGATIVE — SIGNIFICANT CHANGE UP
COLOR SPEC: YELLOW — SIGNIFICANT CHANGE UP
GLUCOSE UR-MCNC: NEGATIVE — SIGNIFICANT CHANGE UP
KETONES UR-MCNC: SIGNIFICANT CHANGE UP
LEUKOCYTE ESTERASE UR-ACNC: SIGNIFICANT CHANGE UP
NITRITE UR-MCNC: NEGATIVE — SIGNIFICANT CHANGE UP
PH UR: 6.5 — SIGNIFICANT CHANGE UP (ref 5–8)
PROT UR-MCNC: 30 — SIGNIFICANT CHANGE UP
RBC CASTS # UR COMP ASSIST: HIGH (ref 0–?)
SP GR SPEC: 1.03 — SIGNIFICANT CHANGE UP (ref 1–1.04)
SQUAMOUS # UR AUTO: SIGNIFICANT CHANGE UP
UROBILINOGEN FLD QL: HIGH
WBC UR QL: HIGH (ref 0–?)

## 2019-04-07 PROCEDURE — 59025 FETAL NON-STRESS TEST: CPT | Mod: 26

## 2019-04-07 NOTE — OB PROVIDER TRIAGE NOTE - NSHPLABSRESULTS_GEN_ALL_CORE
Urinalysis Basic - ( 2019 16:05 )    Color: YELLOW / Appearance: Lt TURBID / S.026 / pH: 6.5  Gluc: NEGATIVE / Ketone: TRACE  / Bili: NEGATIVE / Urobili: SMALL   Blood: NEGATIVE / Protein: 30 / Nitrite: NEGATIVE   Leuk Esterase: SMALL / RBC: 6-10 / WBC 6-10   Sq Epi: FEW / Non Sq Epi: x / Bacteria: FEW

## 2019-04-07 NOTE — OB PROVIDER TRIAGE NOTE - ADDITIONAL INSTRUCTIONS
d/w Dr. Thurman   Discharge pt home  Keep scheduled appt   Fetal kick count reviewed   PTL precautions reviewed

## 2019-04-07 NOTE — OB PROVIDER TRIAGE NOTE - NSOBPROVIDERNOTE_OBGYN_ALL_OB_FT
Pt of Dr. Polo. 32 yo  @ 32 2/7 weeks with complaints of leaking of fluid at 1145 am x 1. Pt reports intermittent contractions since 12 pm. Reports good fetal movement. Denies vaginal bleeding. Denies dysuria. Pt also complaining of sciatica pain.    Current a/p complications: Denies     Allergies: Latex (rash)  Medications: Denies  PMHx: Denies   PSHx: 2012 hernia repair, c/s x 2, d&c x 1  OB:  FT emergency c/s nrfht 10 lbs   2011 FT repeat c/s 9 lbs  ETOP x 1 ( d&cx1)  GYN: Denies   Social: Denies   Psychosocial: Denies     Assessment/plan:   Vital Signs Last 24 Hrs  T(C): 37 (2019 15:15), Max: 37 (2019 15:15)  T(F): 98.6 (2019 15:15), Max: 98.6 (2019 15:15)  HR: 82 (2019 15:16) (82 - 82)  BP: 110/74 (2019 15:16) (110/74 - 110/74)  BP(mean): --  RR: 18 (2019 15:15) (18 - 18)      Abdomen soft, non tender  mild left sided cva tenderness    TAUS cephalic presentation, anterior placenta, RACHELLE 10.85, EFW 1817 gms  TVUS 3.8-4.1cm no funneling or dynamic changes     Cat 1 tracing   irregular contractions/uterine irritability noted     Urine culture to be followed up    d/w Dr. Thurman   Discharge pt home  Keep scheduled appt   Fetal kick count reviewed   PTL precautions reviewed Pt of Dr. Polo. 32 yo  @ 32 2/7 weeks with complaints of leaking of fluid at 1145 am x 1. Pt reports intermittent contractions since 12 pm. Reports good fetal movement. Denies vaginal bleeding. Denies dysuria. Pt also complaining of sciatica pain.    Current a/p complications: Denies     Allergies: Latex (rash)  Medications: Denies  PMHx: Denies   PSHx: 2012 hernia repair, c/s x 2, d&c x 1  OB:  FT emergency c/s nrfht 10 lbs   2011 FT repeat c/s 9 lbs  ETOP x 1 ( d&cx1)  GYN: Denies   Social: Denies   Psychosocial: Denies     Assessment/plan:   Vital Signs Last 24 Hrs  T(C): 37 (2019 15:15), Max: 37 (2019 15:15)  T(F): 98.6 (2019 15:15), Max: 98.6 (2019 15:15)  HR: 82 (2019 15:16) (82 - 82)  BP: 110/74 (2019 15:16) (110/74 - 110/74)  BP(mean): --  RR: 18 (2019 15:15) (18 - 18)      Abdomen soft, non tender  mild left sided cva tenderness    SVE Closed/30/-3    TAUS cephalic presentation, anterior placenta, RACHELLE 10.85, EFW 1817 gms  TVUS 3.8-4.1cm no funneling or dynamic changes     Cat 1 tracing   irregular contractions/uterine irritability noted     Urine culture to be followed up    d/w Dr. Thurman   Discharge pt home  Keep scheduled appt   Fetal kick count reviewed   PTL precautions reviewed

## 2019-04-09 LAB
BACTERIA UR CULT: SIGNIFICANT CHANGE UP
SPECIMEN SOURCE: SIGNIFICANT CHANGE UP

## 2019-05-07 ENCOUNTER — OUTPATIENT (OUTPATIENT)
Dept: INPATIENT UNIT | Facility: HOSPITAL | Age: 34
LOS: 1 days | Discharge: HOME | End: 2019-05-07
Payer: MEDICAID

## 2019-05-07 VITALS
DIASTOLIC BLOOD PRESSURE: 70 MMHG | SYSTOLIC BLOOD PRESSURE: 120 MMHG | RESPIRATION RATE: 17 BRPM | TEMPERATURE: 98 F | HEART RATE: 96 BPM

## 2019-05-07 VITALS — OXYGEN SATURATION: 100 % | HEART RATE: 96 BPM

## 2019-05-07 DIAGNOSIS — O26.899 OTHER SPECIFIED PREGNANCY RELATED CONDITIONS, UNSPECIFIED TRIMESTER: ICD-10-CM

## 2019-05-07 DIAGNOSIS — Z33.2 ENCOUNTER FOR ELECTIVE TERMINATION OF PREGNANCY: Chronic | ICD-10-CM

## 2019-05-07 DIAGNOSIS — Z3A.00 WEEKS OF GESTATION OF PREGNANCY NOT SPECIFIED: ICD-10-CM

## 2019-05-07 LAB
APPEARANCE UR: SIGNIFICANT CHANGE UP
BACTERIA # UR AUTO: NEGATIVE — SIGNIFICANT CHANGE UP
BILIRUB UR-MCNC: NEGATIVE — SIGNIFICANT CHANGE UP
BLOOD UR QL VISUAL: SIGNIFICANT CHANGE UP
COLOR SPEC: YELLOW — SIGNIFICANT CHANGE UP
GLUCOSE UR-MCNC: NEGATIVE — SIGNIFICANT CHANGE UP
HYALINE CASTS # UR AUTO: HIGH
KETONES UR-MCNC: HIGH
LEUKOCYTE ESTERASE UR-ACNC: SIGNIFICANT CHANGE UP
NITRITE UR-MCNC: NEGATIVE — SIGNIFICANT CHANGE UP
PH UR: 6.5 — SIGNIFICANT CHANGE UP (ref 5–8)
PROT UR-MCNC: 20 — SIGNIFICANT CHANGE UP
RBC CASTS # UR COMP ASSIST: SIGNIFICANT CHANGE UP (ref 0–?)
SP GR SPEC: 1.01 — SIGNIFICANT CHANGE UP (ref 1–1.04)
SQUAMOUS # UR AUTO: SIGNIFICANT CHANGE UP
UROBILINOGEN FLD QL: SIGNIFICANT CHANGE UP
WBC UR QL: HIGH (ref 0–?)

## 2019-05-07 PROCEDURE — 59025 FETAL NON-STRESS TEST: CPT | Mod: 26

## 2019-05-07 RX ORDER — SODIUM CHLORIDE 9 MG/ML
1000 INJECTION, SOLUTION INTRAVENOUS
Qty: 0 | Refills: 0 | Status: DISCONTINUED | OUTPATIENT
Start: 2019-05-07 | End: 2019-05-22

## 2019-05-07 RX ORDER — SODIUM CHLORIDE 9 MG/ML
500 INJECTION, SOLUTION INTRAVENOUS ONCE
Qty: 0 | Refills: 0 | Status: DISCONTINUED | OUTPATIENT
Start: 2019-05-07 | End: 2019-05-22

## 2019-05-07 NOTE — OB PROVIDER TRIAGE NOTE - HISTORY OF PRESENT ILLNESS
32 y/o  at 36w4d presents to triage c/o  Reports +FM, no vaginal bleeding, no ROM or LOF  AP complications: Denies  h/o C/section x 2, scheduled for repeat on 19 34 y/o  at 36w4d presents to triage c/o irregular uterine contractions.  Reports +FM, no vaginal bleeding, no ROM or LOF  AP complications: Denies  h/o C/section x 2, scheduled for repeat on 19

## 2019-05-07 NOTE — OB PROVIDER TRIAGE NOTE - NSHPPHYSICALEXAM_GEN_ALL_CORE
T(C): 37.0 (07 May 2019 19:37), Max: 37.0 (07 May 2019 19:37)  T(F): 98.6 (07 May 2019 19:37), Max: 98.6 (07 May 2019 19:37)  HR: 96 (07 May 2019 21:48) (65 - 96)  BP: 112/77 (07 May 2019 19:46) (112/77 - 120/70)  BP(mean): --  RR: 17 (07 May 2019 19:21) (17 - 17)  SpO2: 100% (07 May 2019 21:48) (100% - 100%)    Abdomen: Gravid, soft, NT  NST: Reactive, mod variability, no decels  [place]: Irregular contractions  VE long/closed/post/high, intact membranes

## 2019-05-07 NOTE — OB PROVIDER TRIAGE NOTE - NSOBPROVIDERNOTE_OBGYN_ALL_OB_FT
32 y/o  at 36+wks with  contractions, no evidence of labor at this time  Tracing reviewed with Dr Fitz Byrnes was at patient's bedside    Pt to be discharged home with instructions   labor precautions  Pt to monitor fetal kick counts  pt to follow up with OB as scheduled  Pt to increase PO hydration

## 2019-05-07 NOTE — OB PROVIDER TRIAGE NOTE - ADDITIONAL INSTRUCTIONS
Pt to be discharged home with instructions   labor precautions  Pt to monitor fetal kick counts  pt to follow up with OB as scheduled  Pt to increase PO hydration

## 2019-05-12 ENCOUNTER — OUTPATIENT (OUTPATIENT)
Dept: OUTPATIENT SERVICES | Facility: HOSPITAL | Age: 34
LOS: 1 days | Discharge: HOME | End: 2019-05-12
Payer: MEDICAID

## 2019-05-12 VITALS
HEART RATE: 82 BPM | DIASTOLIC BLOOD PRESSURE: 85 MMHG | RESPIRATION RATE: 18 BRPM | TEMPERATURE: 98 F | SYSTOLIC BLOOD PRESSURE: 127 MMHG

## 2019-05-12 VITALS — OXYGEN SATURATION: 94 % | HEART RATE: 93 BPM

## 2019-05-12 DIAGNOSIS — Z98.890 OTHER SPECIFIED POSTPROCEDURAL STATES: Chronic | ICD-10-CM

## 2019-05-12 DIAGNOSIS — Z33.2 ENCOUNTER FOR ELECTIVE TERMINATION OF PREGNANCY: Chronic | ICD-10-CM

## 2019-05-12 DIAGNOSIS — Z3A.00 WEEKS OF GESTATION OF PREGNANCY NOT SPECIFIED: ICD-10-CM

## 2019-05-12 DIAGNOSIS — O26.899 OTHER SPECIFIED PREGNANCY RELATED CONDITIONS, UNSPECIFIED TRIMESTER: ICD-10-CM

## 2019-05-12 LAB
ALBUMIN SERPL ELPH-MCNC: 3.44 G/DL — SIGNIFICANT CHANGE UP (ref 3.3–5)
ALP SERPL-CCNC: 172 U/L — HIGH (ref 40–120)
ALT FLD-CCNC: 9 U/L — SIGNIFICANT CHANGE UP (ref 4–33)
ANION GAP SERPL CALC-SCNC: 13 MMO/L — SIGNIFICANT CHANGE UP (ref 7–14)
APPEARANCE UR: CLEAR — SIGNIFICANT CHANGE UP
AST SERPL-CCNC: 16 U/L — SIGNIFICANT CHANGE UP (ref 4–32)
B PERT DNA SPEC QL NAA+PROBE: NOT DETECTED — SIGNIFICANT CHANGE UP
BACTERIA # UR AUTO: NEGATIVE — SIGNIFICANT CHANGE UP
BILIRUB SERPL-MCNC: 0.5 MG/DL — SIGNIFICANT CHANGE UP (ref 0.2–1.2)
BILIRUB UR-MCNC: NEGATIVE — SIGNIFICANT CHANGE UP
BLOOD UR QL VISUAL: NEGATIVE — SIGNIFICANT CHANGE UP
BUN SERPL-MCNC: 3 MG/DL — LOW (ref 7–23)
C PNEUM DNA SPEC QL NAA+PROBE: NOT DETECTED — SIGNIFICANT CHANGE UP
CALCIUM SERPL-MCNC: 8.9 MG/DL — SIGNIFICANT CHANGE UP (ref 8.4–10.5)
CHLORIDE SERPL-SCNC: 107 MMOL/L — SIGNIFICANT CHANGE UP (ref 98–107)
CO2 SERPL-SCNC: 19 MMOL/L — LOW (ref 22–31)
COLOR SPEC: SIGNIFICANT CHANGE UP
CREAT SERPL-MCNC: 0.69 MG/DL — SIGNIFICANT CHANGE UP (ref 0.5–1.3)
FLUAV H1 2009 PAND RNA SPEC QL NAA+PROBE: NOT DETECTED — SIGNIFICANT CHANGE UP
FLUAV H1 RNA SPEC QL NAA+PROBE: NOT DETECTED — SIGNIFICANT CHANGE UP
FLUAV H3 RNA SPEC QL NAA+PROBE: NOT DETECTED — SIGNIFICANT CHANGE UP
FLUAV SUBTYP SPEC NAA+PROBE: NOT DETECTED — SIGNIFICANT CHANGE UP
FLUBV RNA SPEC QL NAA+PROBE: NOT DETECTED — SIGNIFICANT CHANGE UP
GLUCOSE SERPL-MCNC: 83 MG/DL — SIGNIFICANT CHANGE UP (ref 70–99)
GLUCOSE UR-MCNC: NEGATIVE — SIGNIFICANT CHANGE UP
HADV DNA SPEC QL NAA+PROBE: NOT DETECTED — SIGNIFICANT CHANGE UP
HCOV PNL SPEC NAA+PROBE: SIGNIFICANT CHANGE UP
HCT VFR BLD CALC: 33.7 % — LOW (ref 34.5–45)
HGB BLD-MCNC: 10.4 G/DL — LOW (ref 11.5–15.5)
HMPV RNA SPEC QL NAA+PROBE: NOT DETECTED — SIGNIFICANT CHANGE UP
HPIV1 RNA SPEC QL NAA+PROBE: NOT DETECTED — SIGNIFICANT CHANGE UP
HPIV2 RNA SPEC QL NAA+PROBE: NOT DETECTED — SIGNIFICANT CHANGE UP
HPIV3 RNA SPEC QL NAA+PROBE: NOT DETECTED — SIGNIFICANT CHANGE UP
HPIV4 RNA SPEC QL NAA+PROBE: NOT DETECTED — SIGNIFICANT CHANGE UP
HYALINE CASTS # UR AUTO: NEGATIVE — SIGNIFICANT CHANGE UP
KETONES UR-MCNC: NEGATIVE — SIGNIFICANT CHANGE UP
LEUKOCYTE ESTERASE UR-ACNC: SIGNIFICANT CHANGE UP
MCHC RBC-ENTMCNC: 26.2 PG — LOW (ref 27–34)
MCHC RBC-ENTMCNC: 30.9 % — LOW (ref 32–36)
MCV RBC AUTO: 84.9 FL — SIGNIFICANT CHANGE UP (ref 80–100)
NITRITE UR-MCNC: NEGATIVE — SIGNIFICANT CHANGE UP
NRBC # FLD: 0 K/UL — SIGNIFICANT CHANGE UP (ref 0–0)
PH UR: 7 — SIGNIFICANT CHANGE UP (ref 5–8)
PLATELET # BLD AUTO: 233 K/UL — SIGNIFICANT CHANGE UP (ref 150–400)
PMV BLD: 11.5 FL — SIGNIFICANT CHANGE UP (ref 7–13)
POTASSIUM SERPL-MCNC: 3.6 MMOL/L — SIGNIFICANT CHANGE UP (ref 3.5–5.3)
POTASSIUM SERPL-SCNC: 3.6 MMOL/L — SIGNIFICANT CHANGE UP (ref 3.5–5.3)
PROT SERPL-MCNC: 6.4 G/DL — SIGNIFICANT CHANGE UP (ref 6–8.3)
PROT UR-MCNC: NEGATIVE — SIGNIFICANT CHANGE UP
RBC # BLD: 3.97 M/UL — SIGNIFICANT CHANGE UP (ref 3.8–5.2)
RBC # FLD: 13.4 % — SIGNIFICANT CHANGE UP (ref 10.3–14.5)
RBC CASTS # UR COMP ASSIST: SIGNIFICANT CHANGE UP (ref 0–?)
RSV RNA SPEC QL NAA+PROBE: NOT DETECTED — SIGNIFICANT CHANGE UP
RV+EV RNA SPEC QL NAA+PROBE: DETECTED — HIGH
SODIUM SERPL-SCNC: 139 MMOL/L — SIGNIFICANT CHANGE UP (ref 135–145)
SP GR SPEC: 1.01 — SIGNIFICANT CHANGE UP (ref 1–1.04)
SQUAMOUS # UR AUTO: SIGNIFICANT CHANGE UP
UROBILINOGEN FLD QL: NORMAL — SIGNIFICANT CHANGE UP
WBC # BLD: 6.66 K/UL — SIGNIFICANT CHANGE UP (ref 3.8–10.5)
WBC # FLD AUTO: 6.66 K/UL — SIGNIFICANT CHANGE UP (ref 3.8–10.5)
WBC UR QL: SIGNIFICANT CHANGE UP (ref 0–?)

## 2019-05-12 PROCEDURE — 71045 X-RAY EXAM CHEST 1 VIEW: CPT | Mod: 26

## 2019-05-12 RX ORDER — SODIUM CHLORIDE 9 MG/ML
3 INJECTION INTRAMUSCULAR; INTRAVENOUS; SUBCUTANEOUS EVERY 8 HOURS
Refills: 0 | Status: DISCONTINUED | OUTPATIENT
Start: 2019-05-12 | End: 2019-05-27

## 2019-05-12 RX ORDER — LORATADINE 10 MG/1
10 TABLET ORAL DAILY
Refills: 0 | Status: DISCONTINUED | OUTPATIENT
Start: 2019-05-12 | End: 2019-05-27

## 2019-05-12 RX ORDER — ALBUTEROL 90 UG/1
2.5 AEROSOL, METERED ORAL ONCE
Refills: 0 | Status: COMPLETED | OUTPATIENT
Start: 2019-05-12 | End: 2019-05-12

## 2019-05-12 RX ADMIN — Medication 200 MILLIGRAM(S): at 23:25

## 2019-05-12 RX ADMIN — ALBUTEROL 2.5 MILLIGRAM(S): 90 AEROSOL, METERED ORAL at 20:43

## 2019-05-12 RX ADMIN — SODIUM CHLORIDE 3 MILLILITER(S): 9 INJECTION INTRAMUSCULAR; INTRAVENOUS; SUBCUTANEOUS at 20:00

## 2019-05-12 NOTE — OB PROVIDER TRIAGE NOTE - NSOBPROVIDERNOTE_OBGYN_ALL_OB_FT
33 year old female P2 at 37.2 wks gestation with SOB /Coughing  r/o pneumonia  r/o flu    CXR   ordered    CBC CMP     continued observation /monitoring    OB status  NST reactive  bpp 8/8    case d/w Dr Villafana     seen with Dr Paul

## 2019-05-12 NOTE — OB PROVIDER TRIAGE NOTE - HISTORY OF PRESENT ILLNESS
33 year old female P2 at 37.2 wks gestation who presented with onset of coughing and feeling SOB since 4am  with coughing  stated had sputum production with coughing and had episodes of emesis from coughing after eating   denied any fever chills dysuria abd pains  denied any sick contacts   denied any recent travel  denied any exposure to Flu  denied any rash malaise   denied any hx of pulm issues      stated LOF with coughing  but intermittent  and feels like passed urine with coughing  feels gfm

## 2019-05-12 NOTE — OB PROVIDER TRIAGE NOTE - NSHPPHYSICALEXAM_GEN_ALL_CORE
pt seen upon arrival   appears alert OX3  appears mild resp distress  coughing frequently     lungs scattered rhonchi at bases no wheezing     heart S1 S2   abd soft  nt    Ve l/c/p/-3   vtx    nitz/fern negative   efw 8# clinically   abd scan  vtz aafi 9  bpp 8/8       Vital Signs   T(C): 36.8 (12 May 2019 18:40), Max: 36.8 (12 May 2019 18:40)  T(F): 98.2 (12 May 2019 18:40), Max: 98.24 (12 May 2019 18:40)  HR: 86 (12 May 2019 19:53) (79 - 102)  BP: 111/71 (12 May 2019 19:52) (100/62 - 127/85)  BP(mean): --  RR: 18 (12 May 2019 18:40) (18 - 18)  SpO2: 99% (12 May 2019 19:48) (96% - 100%)    pt seen with Dr Paul    lung findings confirmed   reviewed VS

## 2019-05-12 NOTE — OB PROVIDER TRIAGE NOTE - PSH
delivery delivered  10/25/2007 F 10lbs and 2011 M 9lbs  H/O umbilical hernia repair    Termination of pregnancy (fetus)  D&C

## 2019-05-12 NOTE — OB RN TRIAGE NOTE - PSH
delivery delivered  10/25/2007 F 10lbs and 2011 M 9lbs  Termination of pregnancy (fetus)  D&C  delivery delivered  10/25/2007 F 10lbs and 2011 M 9lbs  H/O umbilical hernia repair    Termination of pregnancy (fetus)  D&C

## 2019-05-14 ENCOUNTER — OUTPATIENT (OUTPATIENT)
Dept: OUTPATIENT SERVICES | Facility: HOSPITAL | Age: 34
LOS: 1 days | End: 2019-05-14

## 2019-05-14 VITALS
WEIGHT: 181 LBS | RESPIRATION RATE: 16 BRPM | HEIGHT: 64 IN | HEART RATE: 92 BPM | OXYGEN SATURATION: 98 % | TEMPERATURE: 97 F | SYSTOLIC BLOOD PRESSURE: 110 MMHG | DIASTOLIC BLOOD PRESSURE: 60 MMHG

## 2019-05-14 DIAGNOSIS — Z98.890 OTHER SPECIFIED POSTPROCEDURAL STATES: Chronic | ICD-10-CM

## 2019-05-14 DIAGNOSIS — O34.219 MATERNAL CARE FOR UNSPECIFIED TYPE SCAR FROM PREVIOUS CESAREAN DELIVERY: ICD-10-CM

## 2019-05-14 DIAGNOSIS — Z34.93 ENCOUNTER FOR SUPERVISION OF NORMAL PREGNANCY, UNSPECIFIED, THIRD TRIMESTER: ICD-10-CM

## 2019-05-14 DIAGNOSIS — T78.40XA ALLERGY, UNSPECIFIED, INITIAL ENCOUNTER: ICD-10-CM

## 2019-05-14 DIAGNOSIS — Z33.2 ENCOUNTER FOR ELECTIVE TERMINATION OF PREGNANCY: Chronic | ICD-10-CM

## 2019-05-14 LAB
APPEARANCE UR: CLEAR — SIGNIFICANT CHANGE UP
BILIRUB UR-MCNC: NEGATIVE — SIGNIFICANT CHANGE UP
BLD GP AB SCN SERPL QL: NEGATIVE — SIGNIFICANT CHANGE UP
BLOOD UR QL VISUAL: NEGATIVE — SIGNIFICANT CHANGE UP
COLOR SPEC: YELLOW — SIGNIFICANT CHANGE UP
GLUCOSE UR-MCNC: NEGATIVE — SIGNIFICANT CHANGE UP
HCT VFR BLD CALC: 33.1 % — LOW (ref 34.5–45)
HGB BLD-MCNC: 10.1 G/DL — LOW (ref 11.5–15.5)
KETONES UR-MCNC: SIGNIFICANT CHANGE UP
LEUKOCYTE ESTERASE UR-ACNC: NEGATIVE — SIGNIFICANT CHANGE UP
MCHC RBC-ENTMCNC: 25.6 PG — LOW (ref 27–34)
MCHC RBC-ENTMCNC: 30.5 % — LOW (ref 32–36)
MCV RBC AUTO: 83.8 FL — SIGNIFICANT CHANGE UP (ref 80–100)
NITRITE UR-MCNC: NEGATIVE — SIGNIFICANT CHANGE UP
NRBC # FLD: 0 K/UL — SIGNIFICANT CHANGE UP (ref 0–0)
PH UR: 6.5 — SIGNIFICANT CHANGE UP (ref 5–8)
PLATELET # BLD AUTO: 253 K/UL — SIGNIFICANT CHANGE UP (ref 150–400)
PMV BLD: 11.8 FL — SIGNIFICANT CHANGE UP (ref 7–13)
PROT UR-MCNC: 10 — SIGNIFICANT CHANGE UP
RBC # BLD: 3.95 M/UL — SIGNIFICANT CHANGE UP (ref 3.8–5.2)
RBC # FLD: 13.8 % — SIGNIFICANT CHANGE UP (ref 10.3–14.5)
RH IG SCN BLD-IMP: POSITIVE — SIGNIFICANT CHANGE UP
SP GR SPEC: 1.01 — SIGNIFICANT CHANGE UP (ref 1–1.04)
UROBILINOGEN FLD QL: SIGNIFICANT CHANGE UP
WBC # BLD: 6.71 K/UL — SIGNIFICANT CHANGE UP (ref 3.8–10.5)
WBC # FLD AUTO: 6.71 K/UL — SIGNIFICANT CHANGE UP (ref 3.8–10.5)

## 2019-05-14 NOTE — OB PST NOTE - NS_OBGYNHISTORY_OBGYN_ALL_OB_FT
34 yo  at 37.4 weeks gestation scheduled for repeat  section and bilateral tubal ligation on 2019.    She reports a history of one abnormal PAP many years ago

## 2019-05-14 NOTE — OB PST NOTE - HISTORY OF PRESENT ILLNESS
32 yo  at 37.4 weeks gestation scheduled for repeat  section and bilateral tubal ligation on 2019.  She reports positive fetal movement, denies loss of fluid, vaginal bleeding. Reports infrequent uterine contractions.

## 2019-05-14 NOTE — OB PST NOTE - ASSESSMENT
34 yo  at 37.4 weeks gestation scheduled for repeat  section and bilateral tubal ligation on 2019.  She reports positive fetal movement, denies loss of fluid, vaginal bleeding. Reports infrequent uterine contractions.

## 2019-05-14 NOTE — OB PST NOTE - PROBLEM SELECTOR PLAN 1
34 yo  at 37.4 weeks gestation scheduled for repeat  section and bilateral tubal ligation on 2019.    Pre-Op instructions provided to patient.  Surgical scrub instructions reviewed and provided to patient.

## 2019-05-14 NOTE — OB PST NOTE - HEART RATE (BEATS/MIN)
November 14, 2018      Luciana Dobbs  [id number]  [place] [address]      To: Whom It May Concern,    Patient has a viral cold and please excuse from school today but can go back 11/15/18.  If you have any questions or concerns, please call the clinic at the number listed above.       Sincerely,        Brianna Hilario MD              
[phone]

## 2019-05-14 NOTE — OB PST NOTE - NSHPREVIEWOFSYSTEMS_GEN_ALL_CORE
General: No fever, chills, sweating, anorexia, weight loss or weight gain. No polyphagia, polyurea, polydypsia, malaise, or fatigue    Skin: No rashes, itching, or dryness. No change in size/color of moles. No tumors, brittle nails, pitted nails, or hair loss    Breast: No tenderness, lumps, or nipple discharge      Ophthalmologic: No diplopia, photophobia, lacrimation, blurred Vision , or eye discharge    ENMT Symptoms: No hearing difficulty, ear pain, tinnitus, or vertigo. No sinus symptoms, nasal congestion, nasal   discharge, or nasal obstruction    Respiratory and Thorax: No wheezing, dyspnea, cough, hemoptysis, or pleuritic chest pPain     Cardiovascular: No chest pain, palpitations, dyspnea on exertion, orthopnea, paroxysmal nocturnal dyspnea,   peripheral edema, or claudication    Gastrointestinal: Infrequent uterine contractions No nausea, vomiting, diarrhea, constipation, change in bowel habits, flatulence, or melena    Genitourinary/ Pelvis: No hematuria, renal colic, or flank pain.  No urine discoloration, incontinence, dysuria, or urinary hesitancy. Normal urinary frequency. No nocturia, abnormal vaginal bleeding, vaginal discharge, spotting, pelvic pain, or vaginal leakage    Musculoskeletal: No arthralgia, arthritis, joint swelling, muscle cramping, muscle weakness, neck pain, arm pain, or leg pain    Neurological: No transient paralysis, weakness, paresthesias, or seizures. No syncope, tremors, vertigo, loss of sensation, difficulty walking, loss of consciousness, hemiparesis, confusion, or facial palsy    Psychiatric: No suicidal ideation, depression, anxiety, insomnia, memory loss, paranoia, mood swings, agitation, hallucinations, or hyperactivity    Hematology: No gum bleeding, nose bleeding, or skin lumps    Lymphatic: No enlarged or tender lymph nodes. No extremity swelling    Endocrine: No heat or cold intolerance    Immunologic: No recurrent or persistent infections

## 2019-05-15 ENCOUNTER — TRANSCRIPTION ENCOUNTER (OUTPATIENT)
Age: 34
End: 2019-05-15

## 2019-05-15 LAB — T PALLIDUM AB TITR SER: NEGATIVE — SIGNIFICANT CHANGE UP

## 2019-05-16 ENCOUNTER — INPATIENT (INPATIENT)
Facility: HOSPITAL | Age: 34
LOS: 2 days | Discharge: HOME | End: 2019-05-19
Attending: OBSTETRICS & GYNECOLOGY | Admitting: OBSTETRICS & GYNECOLOGY
Payer: MEDICAID

## 2019-05-16 VITALS
WEIGHT: 181 LBS | HEIGHT: 64 IN | HEART RATE: 84 BPM | DIASTOLIC BLOOD PRESSURE: 88 MMHG | TEMPERATURE: 98 F | SYSTOLIC BLOOD PRESSURE: 130 MMHG | RESPIRATION RATE: 17 BRPM

## 2019-05-16 DIAGNOSIS — Z33.2 ENCOUNTER FOR ELECTIVE TERMINATION OF PREGNANCY: Chronic | ICD-10-CM

## 2019-05-16 DIAGNOSIS — Z3A.00 WEEKS OF GESTATION OF PREGNANCY NOT SPECIFIED: ICD-10-CM

## 2019-05-16 DIAGNOSIS — Z98.890 OTHER SPECIFIED POSTPROCEDURAL STATES: Chronic | ICD-10-CM

## 2019-05-16 DIAGNOSIS — O26.899 OTHER SPECIFIED PREGNANCY RELATED CONDITIONS, UNSPECIFIED TRIMESTER: ICD-10-CM

## 2019-05-16 LAB
BASOPHILS # BLD AUTO: 0.01 K/UL — SIGNIFICANT CHANGE UP (ref 0–0.2)
BASOPHILS NFR BLD AUTO: 0.2 % — SIGNIFICANT CHANGE UP (ref 0–2)
EOSINOPHIL # BLD AUTO: 0.05 K/UL — SIGNIFICANT CHANGE UP (ref 0–0.5)
EOSINOPHIL NFR BLD AUTO: 1 % — SIGNIFICANT CHANGE UP (ref 0–6)
HBV SURFACE AG SER-ACNC: NEGATIVE — SIGNIFICANT CHANGE UP
HCT VFR BLD CALC: 24 % — LOW (ref 34.5–45)
HGB BLD-MCNC: 7.5 G/DL — LOW (ref 11.5–15.5)
IMM GRANULOCYTES NFR BLD AUTO: 0.8 % — SIGNIFICANT CHANGE UP (ref 0–1.5)
LYMPHOCYTES # BLD AUTO: 1.11 K/UL — SIGNIFICANT CHANGE UP (ref 1–3.3)
LYMPHOCYTES # BLD AUTO: 21.3 % — SIGNIFICANT CHANGE UP (ref 13–44)
MCHC RBC-ENTMCNC: 26.4 PG — LOW (ref 27–34)
MCHC RBC-ENTMCNC: 31.3 % — LOW (ref 32–36)
MCV RBC AUTO: 84.5 FL — SIGNIFICANT CHANGE UP (ref 80–100)
MONOCYTES # BLD AUTO: 0.4 K/UL — SIGNIFICANT CHANGE UP (ref 0–0.9)
MONOCYTES NFR BLD AUTO: 7.7 % — SIGNIFICANT CHANGE UP (ref 2–14)
NEUTROPHILS # BLD AUTO: 3.61 K/UL — SIGNIFICANT CHANGE UP (ref 1.8–7.4)
NEUTROPHILS NFR BLD AUTO: 69 % — SIGNIFICANT CHANGE UP (ref 43–77)
NRBC # FLD: 0 K/UL — SIGNIFICANT CHANGE UP (ref 0–0)
PLATELET # BLD AUTO: 263 K/UL — SIGNIFICANT CHANGE UP (ref 150–400)
PMV BLD: 12.4 FL — SIGNIFICANT CHANGE UP (ref 7–13)
RBC # BLD: 2.84 M/UL — LOW (ref 3.8–5.2)
RBC # FLD: 13.9 % — SIGNIFICANT CHANGE UP (ref 10.3–14.5)
RH IG SCN BLD-IMP: POSITIVE — SIGNIFICANT CHANGE UP
WBC # BLD: 5.22 K/UL — SIGNIFICANT CHANGE UP (ref 3.8–10.5)
WBC # FLD AUTO: 5.22 K/UL — SIGNIFICANT CHANGE UP (ref 3.8–10.5)

## 2019-05-16 RX ORDER — SODIUM CHLORIDE 9 MG/ML
1000 INJECTION, SOLUTION INTRAVENOUS ONCE
Refills: 0 | Status: DISCONTINUED | OUTPATIENT
Start: 2019-05-16 | End: 2019-05-16

## 2019-05-16 RX ORDER — FAMOTIDINE 10 MG/ML
20 INJECTION INTRAVENOUS ONCE
Refills: 0 | Status: DISCONTINUED | OUTPATIENT
Start: 2019-05-16 | End: 2019-05-16

## 2019-05-16 RX ORDER — SODIUM CHLORIDE 9 MG/ML
1000 INJECTION, SOLUTION INTRAVENOUS
Refills: 0 | Status: DISCONTINUED | OUTPATIENT
Start: 2019-05-16 | End: 2019-05-16

## 2019-05-16 RX ORDER — METOCLOPRAMIDE HCL 10 MG
10 TABLET ORAL ONCE
Refills: 0 | Status: COMPLETED | OUTPATIENT
Start: 2019-05-16 | End: 2019-05-16

## 2019-05-16 RX ORDER — METOCLOPRAMIDE HCL 10 MG
10 TABLET ORAL ONCE
Refills: 0 | Status: DISCONTINUED | OUTPATIENT
Start: 2019-05-16 | End: 2019-05-16

## 2019-05-16 RX ORDER — CITRIC ACID/SODIUM CITRATE 300-500 MG
30 SOLUTION, ORAL ORAL ONCE
Refills: 0 | Status: DISCONTINUED | OUTPATIENT
Start: 2019-05-16 | End: 2019-05-16

## 2019-05-16 RX ORDER — HYDROMORPHONE HYDROCHLORIDE 2 MG/ML
0.5 INJECTION INTRAMUSCULAR; INTRAVENOUS; SUBCUTANEOUS
Refills: 0 | Status: DISCONTINUED | OUTPATIENT
Start: 2019-05-16 | End: 2019-05-18

## 2019-05-16 RX ORDER — OXYTOCIN 10 UNIT/ML
333.33 VIAL (ML) INJECTION
Qty: 20 | Refills: 0 | Status: DISCONTINUED | OUTPATIENT
Start: 2019-05-16 | End: 2019-05-16

## 2019-05-16 RX ORDER — ONDANSETRON 8 MG/1
4 TABLET, FILM COATED ORAL ONCE
Refills: 0 | Status: DISCONTINUED | OUTPATIENT
Start: 2019-05-16 | End: 2019-05-18

## 2019-05-16 RX ORDER — SODIUM CHLORIDE 9 MG/ML
1000 INJECTION, SOLUTION INTRAVENOUS ONCE
Refills: 0 | Status: DISCONTINUED | OUTPATIENT
Start: 2019-05-16 | End: 2019-05-17

## 2019-05-16 RX ORDER — FAMOTIDINE 10 MG/ML
20 INJECTION INTRAVENOUS ONCE
Refills: 0 | Status: COMPLETED | OUTPATIENT
Start: 2019-05-16 | End: 2019-05-16

## 2019-05-16 RX ADMIN — FAMOTIDINE 20 MILLIGRAM(S): 10 INJECTION INTRAVENOUS at 20:53

## 2019-05-16 RX ADMIN — Medication 10 MILLIGRAM(S): at 20:53

## 2019-05-16 NOTE — OB PROVIDER TRIAGE NOTE - NSOBPROVIDERNOTE_OBGYN_ALL_OB_FT
A&P: abdmit for rLTCS, pt in labor dion painfully with 2 prior LTCS  -routine labs  -EFM/toco  -NPO, IV hydration  Fetal: cat 1 tracing, fetal status reassuring      d/w Dr. Christ fisher pgy-1

## 2019-05-16 NOTE — OB PROVIDER H&P - HISTORY OF PRESENT ILLNESS
Pt is a 32y/o  at 37w6d seen in triage for r/o labor in the setting of 2 prior LTCS. Pt reports irregular CTX since 4am last night, Q2-6min. Pt reports pain is 6/10 in intensity. Denies LOF, VB. +FM. Pt ate a full meal of pasta at 1245p  Prenatal course uncomplicated  EFW 9#    OBHx:  pLTCS NRFHT 10#,  rLTCS 9#, eTOPx1  GynHx:denies  PMHx:denies  PSHx:umbilical hernia repair with mesh   Med:denies  All:latex  SH:neg x3      GA: moderate distress with CTX  EFH:150/mod/+accels/-decels  [place]:irregular  VE:0/50/-4, soft

## 2019-05-16 NOTE — OB PROVIDER H&P - ASSESSMENT
A&P: Admit for rLTCS, pt in labor dion painfully in the setting of 2 prior LTCS. Pt will be NPO at 845pm  -pt ok for stadol if needed prior to 845p  -routine labs  -pepcid/reglan/bicitra   -EFM/toco  -NPO, IV hydration  Fetal: cat 1 tracing, fetal status reassuring        d/w Dr. Christ Brandt, PGY-1

## 2019-05-16 NOTE — OB PROVIDER DELIVERY SUMMARY - NSPROVIDERDELIVERYNOTE_OBGYN_ALL_OB_FT
dense adhesions noted between fascia, rectus muscles, peritoneum and anterior uterus, lysed sharply and bluntly with hemostasis assured. LT incision on uterus. Baby girl delivered atraumatically, delayed cord clamping performed, nares and mouth suctioned, APGARS  8/9. Weight 7lb 13 oz. Baby handed to peds. Hysterotomy closed hemostatically. BTL performed using modified Jayme technique. Muscle reapproximated, fibrilar and interceed placed. Excellent hemostasis, fascia closed, SQ layer reapproximated and skin closed with suture. Patient and baby in good condition transferred to PACU

## 2019-05-16 NOTE — OB PROVIDER TRIAGE NOTE - HISTORY OF PRESENT ILLNESS
Pt is a 32y/o  at 37w6d seen in triage for r/o labor in the setting of 2 prior LTCS. Pt reports irregular CTX since 4am last night, Q2-6min. Pt reports pain is 6/10 in intensity. Denies LOF, VB. +FM. Pt ate a full meal of pasta at 1245p  Prenatal course uncomplicated  EFW 9#    OBHx:  pLTCS NRFHT 10#,  rLTCS 9#, eTOPx1  GynHx:denies  PMHx:denies  PSHx:umbilical hernia repair with mesh   Med:denies  All:latex  SH:neg x3      GA: moderate distress with CTX  EFH:150/mod/+accels/-decels  [place]:irregular  VE:0/50/-4, soft

## 2019-05-16 NOTE — OB PROVIDER H&P - ATTENDING COMMENTS
Patient was seen and evaluated at bedside. H&P reviewed. She presented to LD today with persistent painful contractions every 2-6 min since early am. Patient is at 37 6/7 weeks, in labor at later . FHT, labs and vital signs reviewed and reassuring. Patient was scheduled for RCD, BTL at 39 weeks. Diagnosed with  labor. Plan to proceed with  delivery and BTL today, after 8 hour fast.   patient was counseled on the risks of the procedure: pain, bleeding, infection, injury to adjacent organs and vessels, possible hysterectomy. Informed consent signed and questions answered. Patient is aware that bilateral tubal ligation at  delivery is permanent sterilization method, effectiveness discussed. Patient accepts blood transfusion if necessary.

## 2019-05-16 NOTE — OB NEONATOLOGY/PEDIATRICIAN DELIVERY SUMMARY - NSPEDSNEONOTESA_OBGYN_ALL_OB_FT
Baby is a 37.6 week GA female born to a 34y/o  mother via repeat C/S. Maternal history uncomplicated. Pregnancy uncomplicated. Maternal blood type A+. Prenatal labs negative/non reactive. Rubella pending. GBS pos, no treatment. AROM at delivery with clear fluid. Baby born vigorous and crying spontaneously. Warmed, dried, stimulated. Apgars8/9. EOS 0.11 (for labor).

## 2019-05-17 ENCOUNTER — RESULT REVIEW (OUTPATIENT)
Age: 34
End: 2019-05-17

## 2019-05-17 LAB
ANISOCYTOSIS BLD QL: SLIGHT — SIGNIFICANT CHANGE UP
BASOPHILS # BLD AUTO: 0.02 K/UL — SIGNIFICANT CHANGE UP (ref 0–0.2)
BASOPHILS NFR BLD AUTO: 0.2 % — SIGNIFICANT CHANGE UP (ref 0–2)
BASOPHILS NFR SPEC: 0 % — SIGNIFICANT CHANGE UP (ref 0–2)
BLASTS # FLD: 0 % — SIGNIFICANT CHANGE UP (ref 0–0)
EOSINOPHIL # BLD AUTO: 0 K/UL — SIGNIFICANT CHANGE UP (ref 0–0.5)
EOSINOPHIL NFR BLD AUTO: 0 % — SIGNIFICANT CHANGE UP (ref 0–6)
EOSINOPHIL NFR FLD: 0 % — SIGNIFICANT CHANGE UP (ref 0–6)
GIANT PLATELETS BLD QL SMEAR: PRESENT — SIGNIFICANT CHANGE UP
HCT VFR BLD CALC: 34.7 % — SIGNIFICANT CHANGE UP (ref 34.5–45)
HGB BLD-MCNC: 10.4 G/DL — LOW (ref 11.5–15.5)
IMM GRANULOCYTES NFR BLD AUTO: 0.7 % — SIGNIFICANT CHANGE UP (ref 0–1.5)
LYMPHOCYTES # BLD AUTO: 0.57 K/UL — LOW (ref 1–3.3)
LYMPHOCYTES # BLD AUTO: 4.7 % — LOW (ref 13–44)
LYMPHOCYTES NFR SPEC AUTO: 2.6 % — LOW (ref 13–44)
MCHC RBC-ENTMCNC: 25.6 PG — LOW (ref 27–34)
MCHC RBC-ENTMCNC: 30 % — LOW (ref 32–36)
MCV RBC AUTO: 85.3 FL — SIGNIFICANT CHANGE UP (ref 80–100)
METAMYELOCYTES # FLD: 0 % — SIGNIFICANT CHANGE UP (ref 0–1)
MONOCYTES # BLD AUTO: 0.72 K/UL — SIGNIFICANT CHANGE UP (ref 0–0.9)
MONOCYTES NFR BLD AUTO: 5.9 % — SIGNIFICANT CHANGE UP (ref 2–14)
MONOCYTES NFR BLD: 3.5 % — SIGNIFICANT CHANGE UP (ref 2–9)
MYELOCYTES NFR BLD: 0 % — SIGNIFICANT CHANGE UP (ref 0–0)
NEUTROPHIL AB SER-ACNC: 87.7 % — HIGH (ref 43–77)
NEUTROPHILS # BLD AUTO: 10.82 K/UL — HIGH (ref 1.8–7.4)
NEUTROPHILS NFR BLD AUTO: 88.5 % — HIGH (ref 43–77)
NEUTS BAND # BLD: 5.3 % — SIGNIFICANT CHANGE UP (ref 0–6)
NRBC # FLD: 0 K/UL — SIGNIFICANT CHANGE UP (ref 0–0)
OTHER - HEMATOLOGY %: 0 — SIGNIFICANT CHANGE UP
PLATELET # BLD AUTO: 223 K/UL — SIGNIFICANT CHANGE UP (ref 150–400)
PLATELET COUNT - ESTIMATE: NORMAL — SIGNIFICANT CHANGE UP
PMV BLD: 11.6 FL — SIGNIFICANT CHANGE UP (ref 7–13)
PROMYELOCYTES # FLD: 0 % — SIGNIFICANT CHANGE UP (ref 0–0)
RBC # BLD: 4.07 M/UL — SIGNIFICANT CHANGE UP (ref 3.8–5.2)
RBC # FLD: 14 % — SIGNIFICANT CHANGE UP (ref 10.3–14.5)
T PALLIDUM AB TITR SER: NEGATIVE — SIGNIFICANT CHANGE UP
VARIANT LYMPHS # BLD: 0.9 % — SIGNIFICANT CHANGE UP
WBC # BLD: 12.21 K/UL — HIGH (ref 3.8–10.5)
WBC # FLD AUTO: 12.21 K/UL — HIGH (ref 3.8–10.5)

## 2019-05-17 PROCEDURE — 88302 TISSUE EXAM BY PATHOLOGIST: CPT | Mod: 26

## 2019-05-17 RX ORDER — SIMETHICONE 80 MG/1
80 TABLET, CHEWABLE ORAL EVERY 4 HOURS
Refills: 0 | Status: DISCONTINUED | OUTPATIENT
Start: 2019-05-17 | End: 2019-05-19

## 2019-05-17 RX ORDER — SODIUM CHLORIDE 9 MG/ML
1000 INJECTION, SOLUTION INTRAVENOUS
Refills: 0 | Status: DISCONTINUED | OUTPATIENT
Start: 2019-05-17 | End: 2019-05-17

## 2019-05-17 RX ORDER — GLYCERIN ADULT
1 SUPPOSITORY, RECTAL RECTAL AT BEDTIME
Refills: 0 | Status: DISCONTINUED | OUTPATIENT
Start: 2019-05-17 | End: 2019-05-19

## 2019-05-17 RX ORDER — TETANUS TOXOID, REDUCED DIPHTHERIA TOXOID AND ACELLULAR PERTUSSIS VACCINE, ADSORBED 5; 2.5; 8; 8; 2.5 [IU]/.5ML; [IU]/.5ML; UG/.5ML; UG/.5ML; UG/.5ML
0.5 SUSPENSION INTRAMUSCULAR ONCE
Refills: 0 | Status: DISCONTINUED | OUTPATIENT
Start: 2019-05-17 | End: 2019-05-19

## 2019-05-17 RX ORDER — IBUPROFEN 200 MG
600 TABLET ORAL EVERY 6 HOURS
Refills: 0 | Status: COMPLETED | OUTPATIENT
Start: 2019-05-17 | End: 2020-04-14

## 2019-05-17 RX ORDER — OXYTOCIN 10 UNIT/ML
333.33 VIAL (ML) INJECTION
Qty: 20 | Refills: 0 | Status: DISCONTINUED | OUTPATIENT
Start: 2019-05-17 | End: 2019-05-17

## 2019-05-17 RX ORDER — DOCUSATE SODIUM 100 MG
100 CAPSULE ORAL
Refills: 0 | Status: DISCONTINUED | OUTPATIENT
Start: 2019-05-17 | End: 2019-05-19

## 2019-05-17 RX ORDER — ACETAMINOPHEN 500 MG
975 TABLET ORAL EVERY 6 HOURS
Refills: 0 | Status: DISCONTINUED | OUTPATIENT
Start: 2019-05-17 | End: 2019-05-18

## 2019-05-17 RX ORDER — OXYCODONE HYDROCHLORIDE 5 MG/1
5 TABLET ORAL
Refills: 0 | Status: DISCONTINUED | OUTPATIENT
Start: 2019-05-17 | End: 2019-05-19

## 2019-05-17 RX ORDER — HEPARIN SODIUM 5000 [USP'U]/ML
5000 INJECTION INTRAVENOUS; SUBCUTANEOUS EVERY 12 HOURS
Refills: 0 | Status: DISCONTINUED | OUTPATIENT
Start: 2019-05-17 | End: 2019-05-19

## 2019-05-17 RX ORDER — KETOROLAC TROMETHAMINE 30 MG/ML
30 SYRINGE (ML) INJECTION EVERY 6 HOURS
Refills: 0 | Status: DISCONTINUED | OUTPATIENT
Start: 2019-05-17 | End: 2019-05-18

## 2019-05-17 RX ORDER — LANOLIN
1 OINTMENT (GRAM) TOPICAL EVERY 6 HOURS
Refills: 0 | Status: DISCONTINUED | OUTPATIENT
Start: 2019-05-17 | End: 2019-05-19

## 2019-05-17 RX ORDER — DIPHENHYDRAMINE HCL 50 MG
25 CAPSULE ORAL EVERY 6 HOURS
Refills: 0 | Status: DISCONTINUED | OUTPATIENT
Start: 2019-05-17 | End: 2019-05-19

## 2019-05-17 RX ORDER — MAGNESIUM HYDROXIDE 400 MG/1
30 TABLET, CHEWABLE ORAL
Refills: 0 | Status: DISCONTINUED | OUTPATIENT
Start: 2019-05-17 | End: 2019-05-19

## 2019-05-17 RX ORDER — OXYCODONE HYDROCHLORIDE 5 MG/1
5 TABLET ORAL ONCE
Refills: 0 | Status: DISCONTINUED | OUTPATIENT
Start: 2019-05-17 | End: 2019-05-19

## 2019-05-17 RX ADMIN — SODIUM CHLORIDE 75 MILLILITER(S): 9 INJECTION, SOLUTION INTRAVENOUS at 00:59

## 2019-05-17 RX ADMIN — Medication 30 MILLIGRAM(S): at 07:02

## 2019-05-17 RX ADMIN — Medication 30 MILLIGRAM(S): at 18:15

## 2019-05-17 RX ADMIN — HEPARIN SODIUM 5000 UNIT(S): 5000 INJECTION INTRAVENOUS; SUBCUTANEOUS at 17:28

## 2019-05-17 RX ADMIN — Medication 30 MILLIGRAM(S): at 13:03

## 2019-05-17 RX ADMIN — HEPARIN SODIUM 5000 UNIT(S): 5000 INJECTION INTRAVENOUS; SUBCUTANEOUS at 06:32

## 2019-05-17 RX ADMIN — Medication 30 MILLIGRAM(S): at 06:32

## 2019-05-17 RX ADMIN — Medication 30 MILLIGRAM(S): at 17:28

## 2019-05-17 RX ADMIN — Medication 30 MILLIGRAM(S): at 12:09

## 2019-05-17 NOTE — PROGRESS NOTE ADULT - SUBJECTIVE AND OBJECTIVE BOX
Postpartum Note,  Section  She is a  33y woman who is now post-operative day:     Subjective:          Physical exam:    Vital Signs Last 24 Hrs  T(C): 37.1 (17 May 2019 10:30), Max: 37.1 (17 May 2019 10:30)  T(F): 98.7 (17 May 2019 10:30), Max: 98.7 (17 May 2019 10:30)  HR: 82 (17 May 2019 10:30) (66 - 98)  BP: 128/80 (17 May 2019 10:30) (106/59 - 130/88)  BP(mean): 76 (17 May 2019 01:00) (70 - 81)  RR: 18 (17 May 2019 10:30) (16 - 27)  SpO2: 99% (17 May 2019 10:30) (96% - 99%)    Gen: NAD  Breast: Soft, nontender, not engorged.  Abdomen: Soft, nontender, no distension , firm uterine fundus at umbilicus.  Incision: Clean, dry, and intact with steri strips  Pelvic: Normal lochia noted  Ext: No calf tenderness    LABS:                        10.4   12.21 )-----------( 223      ( 17 May 2019 06:00 )             34.7     ABO Interpretation: A ( @ 19:43)  Rh Interpretation: Positive ( @ 19:43)    Rubella status:     Allergies    latex (Rash; Urticaria)  No Known Drug Allergies    Intolerances      MEDICATIONS  (STANDING):  acetaminophen   Tablet .. 975 milliGRAM(s) Oral every 6 hours  diphtheria/tetanus/pertussis (acellular) Vaccine (ADAcel) 0.5 milliLiter(s) IntraMuscular once  heparin  Injectable 5000 Unit(s) SubCutaneous every 12 hours  ibuprofen  Tablet. 600 milliGRAM(s) Oral every 6 hours  ketorolac   Injectable 30 milliGRAM(s) IV Push every 6 hours  prenatal multivitamin 1 Tablet(s) Oral daily    MEDICATIONS  (PRN):  diphenhydrAMINE 25 milliGRAM(s) Oral every 6 hours PRN Itching  docusate sodium 100 milliGRAM(s) Oral two times a day PRN Stool softening  glycerin Suppository - Adult 1 Suppository(s) Rectal at bedtime PRN Constipation  HYDROmorphone  Injectable 0.5 milliGRAM(s) IV Push every 10 minutes PRN Moderate Pain (4 - 6)  lanolin Ointment 1 Application(s) Topical every 6 hours PRN Sore Nipples  magnesium hydroxide Suspension 30 milliLiter(s) Oral two times a day PRN Constipation  ondansetron Injectable 4 milliGRAM(s) IV Push once PRN Nausea and/or Vomiting  oxyCODONE    IR 5 milliGRAM(s) Oral every 3 hours PRN Moderate Pain (4 - 6)  oxyCODONE    IR 5 milliGRAM(s) Oral once PRN Severe Pain (7 - 10)  simethicone 80 milliGRAM(s) Chew every 4 hours PRN Gas OB Attending on call: POD#1 s/p RC/S with PP BTL for Labor at TERM    Subjective:    Patient was seen and examined at bedside. Doing well. Ambulating, voiding. No fever/chills. Denies any C/P, SOB, N/V, Calf pain or any F/U/D. Breast feeding and lochia is mild.  Pain NONE. No Flatus or BM yet.      Physical exam:    Vital Signs Last 24 Hrs  T(C): 37.1 (17 May 2019 10:30), Max: 37.1 (17 May 2019 10:30)  T(F): 98.7 (17 May 2019 10:30), Max: 98.7 (17 May 2019 10:30)  HR: 82 (17 May 2019 10:30) (66 - 98)  BP: 128/80 (17 May 2019 10:30) (106/59 - 130/88)  BP(mean): 76 (17 May 2019 01:00) (70 - 81)  RR: 18 (17 May 2019 10:30) (16 - 27)  SpO2: 99% (17 May 2019 10:30) (96% - 99%)    Gen: NAD  Breast: Soft, nontender, not engorged.  CVS: Positive S1S2, RRR  Lungs: CTA B/L, No W/R/R  Abdomen: Soft, nontender, no distension , firm uterine fundus at umbilicus.  Incision: Positive serosanguinous, mild fluid, and intact with few displaced steri strips  Ext: No calf tenderness    LABS:                        10.4   12.21 )-----------( 223      ( 17 May 2019 06:00 )             34.7     ABO Interpretation: A (05-16 @ 19:43)  Rh Interpretation: Positive (05-16 @ 19:43)    Rubella status:     Allergies    latex (Rash; Urticaria)  No Known Drug Allergies    Intolerances      MEDICATIONS  (STANDING):  acetaminophen   Tablet .. 975 milliGRAM(s) Oral every 6 hours  diphtheria/tetanus/pertussis (acellular) Vaccine (ADAcel) 0.5 milliLiter(s) IntraMuscular once  heparin  Injectable 5000 Unit(s) SubCutaneous every 12 hours  ibuprofen  Tablet. 600 milliGRAM(s) Oral every 6 hours  ketorolac   Injectable 30 milliGRAM(s) IV Push every 6 hours  prenatal multivitamin 1 Tablet(s) Oral daily    MEDICATIONS  (PRN):  diphenhydrAMINE 25 milliGRAM(s) Oral every 6 hours PRN Itching  docusate sodium 100 milliGRAM(s) Oral two times a day PRN Stool softening  glycerin Suppository - Adult 1 Suppository(s) Rectal at bedtime PRN Constipation  HYDROmorphone  Injectable 0.5 milliGRAM(s) IV Push every 10 minutes PRN Moderate Pain (4 - 6)  lanolin Ointment 1 Application(s) Topical every 6 hours PRN Sore Nipples  magnesium hydroxide Suspension 30 milliLiter(s) Oral two times a day PRN Constipation  ondansetron Injectable 4 milliGRAM(s) IV Push once PRN Nausea and/or Vomiting  oxyCODONE    IR 5 milliGRAM(s) Oral every 3 hours PRN Moderate Pain (4 - 6)  oxyCODONE    IR 5 milliGRAM(s) Oral once PRN Severe Pain (7 - 10)  simethicone 80 milliGRAM(s) Chew every 4 hours PRN Gas

## 2019-05-17 NOTE — PROVIDER CONTACT NOTE (OTHER) - ASSESSMENT
Patient in no S/S of distress. Patient stated she does not feel cold. Patient warm to touch. VS are stable. See flowsheet.

## 2019-05-17 NOTE — PROVIDER CONTACT NOTE (OTHER) - SITUATION
Patients temperature at 0450 was 95.2F rectally. Upon admission, patient stated she had a big cup of ice chips downstairs in PACU. Was unable to detect temperature.

## 2019-05-17 NOTE — PROGRESS NOTE ADULT - ASSESSMENT
A/P: POD#1 s/p RC/S with PP BTL for Labor at TERM    Doing well  Ambulate  Increased ISM use  Encourage breast feeding  Cont. postop care

## 2019-05-17 NOTE — PROVIDER CONTACT NOTE (OTHER) - ACTION/TREATMENT ORDERED:
As per MD, no further interventions to be done at this time. Rechecked pts. temperature. Was 97.3 orally. MD states this is fine. Monitoring continues. Endorsed the following to dayshift nurse.

## 2019-05-18 ENCOUNTER — TRANSCRIPTION ENCOUNTER (OUTPATIENT)
Age: 34
End: 2019-05-18

## 2019-05-18 RX ORDER — IBUPROFEN 200 MG
600 TABLET ORAL EVERY 6 HOURS
Refills: 0 | Status: DISCONTINUED | OUTPATIENT
Start: 2019-05-18 | End: 2019-05-18

## 2019-05-18 RX ORDER — IBUPROFEN 200 MG
600 TABLET ORAL EVERY 6 HOURS
Refills: 0 | Status: DISCONTINUED | OUTPATIENT
Start: 2019-05-18 | End: 2019-05-19

## 2019-05-18 RX ORDER — ACETAMINOPHEN 500 MG
2 TABLET ORAL
Qty: 40 | Refills: 1
Start: 2019-05-18 | End: 2019-05-27

## 2019-05-18 RX ORDER — IBUPROFEN 200 MG
1 TABLET ORAL
Qty: 28 | Refills: 1
Start: 2019-05-18 | End: 2019-05-31

## 2019-05-18 RX ORDER — ACETAMINOPHEN 500 MG
975 TABLET ORAL EVERY 6 HOURS
Refills: 0 | Status: DISCONTINUED | OUTPATIENT
Start: 2019-05-18 | End: 2019-05-19

## 2019-05-18 RX ADMIN — SIMETHICONE 80 MILLIGRAM(S): 80 TABLET, CHEWABLE ORAL at 08:00

## 2019-05-18 RX ADMIN — Medication 600 MILLIGRAM(S): at 15:35

## 2019-05-18 RX ADMIN — SIMETHICONE 80 MILLIGRAM(S): 80 TABLET, CHEWABLE ORAL at 21:41

## 2019-05-18 RX ADMIN — Medication 600 MILLIGRAM(S): at 21:40

## 2019-05-18 RX ADMIN — Medication 30 MILLIGRAM(S): at 00:42

## 2019-05-18 RX ADMIN — Medication 30 MILLIGRAM(S): at 00:09

## 2019-05-18 RX ADMIN — HEPARIN SODIUM 5000 UNIT(S): 5000 INJECTION INTRAVENOUS; SUBCUTANEOUS at 19:06

## 2019-05-18 RX ADMIN — Medication 600 MILLIGRAM(S): at 09:00

## 2019-05-18 RX ADMIN — Medication 600 MILLIGRAM(S): at 16:30

## 2019-05-18 RX ADMIN — Medication 600 MILLIGRAM(S): at 08:00

## 2019-05-18 RX ADMIN — Medication 600 MILLIGRAM(S): at 01:05

## 2019-05-18 RX ADMIN — SIMETHICONE 80 MILLIGRAM(S): 80 TABLET, CHEWABLE ORAL at 15:35

## 2019-05-18 RX ADMIN — HEPARIN SODIUM 5000 UNIT(S): 5000 INJECTION INTRAVENOUS; SUBCUTANEOUS at 06:27

## 2019-05-18 NOTE — DISCHARGE NOTE OB - CARE PROVIDER_API CALL
Marbella Polo)  Obstetrics and Gynecology  [address] [place] [address]  Phone: [phone]  Fax: [phone]  Follow Up Time:

## 2019-05-18 NOTE — DISCHARGE NOTE OB - MEDICATION SUMMARY - MEDICATIONS TO TAKE
I will START or STAY ON the medications listed below when I get home from the hospital:    Tylenol Extra Strength 500 mg oral tablet  -- 2 tab(s) by mouth every 6 hours -for moderate pain   -- This product contains acetaminophen.  Do not use  with any other product containing acetaminophen to prevent possible liver damage.    -- Indication: For Weeks of gestation of pregnancy not specified     mg oral tablet  -- 1 tab(s) by mouth every 6 hours -for severe pain   -- Do not take this drug if you are pregnant.  It is very important that you take or use this exactly as directed.  Do not skip doses or discontinue unless directed by your doctor.  May cause drowsiness or dizziness.  Obtain medical advice before taking any non-prescription drugs as some may affect the action of this medication.  Take with food or milk.    -- Indication: For Weeks of gestation of pregnancy not specified

## 2019-05-18 NOTE — DISCHARGE NOTE OB - CARE PLAN
Principal Discharge DX:	Term birth of female   Goal:	full recovery  Assessment and plan of treatment:	Continue prenatals daily  Iron rich foods  No heavy lifting x 8wks  Pelvic rest x 6wks  Continue prenatal vitamins  Iron rich foods

## 2019-05-18 NOTE — DISCHARGE NOTE OB - HOSPITAL COURSE
The patient presented to labor and delivery in early labor. A live female infant was born in via RCS, Apgar 9/9, [id number].  The patient had uneventful hospital course and discharged on POD3 in stable condition.

## 2019-05-18 NOTE — DISCHARGE NOTE OB - PLAN OF CARE
full recovery Continue prenatals daily  Iron rich foods  No heavy lifting x 8wks  Pelvic rest x 6wks  Continue prenatal vitamins  Iron rich foods

## 2019-05-18 NOTE — PROGRESS NOTE ADULT - SUBJECTIVE AND OBJECTIVE BOX
Patient assessed at 0756.  Subjective  Pain: Patient denies any pain at the time of assessment. Pain being managed well by pain management protocol.  Complaints: None. Patient denies any headache, blur vision, dizziness and/or weakness/fatigue  Milestones:  Alert and orientedx3. Out of bed ambulating. Positive flatus. Negative bowel movement, denies urge. Voiding.  Tolerating a regular diet.  Infant feeding: breastfeeding and formula feeding  Feeding related issues and/or concerns: none    Objective  Vital Signs:  Vital Signs Last 24 Hrs  T(C): 37.2 (18 May 2019 05:33), Max: 37.2 (18 May 2019 05:33)  T(F): 98.9 (18 May 2019 05:33), Max: 98.9 (18 May 2019 05:33)  HR: 87 (18 May 2019 05:33) (74 - 87)  BP: 114/76 (18 May 2019 05:33) (106/66 - 128/80)  BP(mean): --  RR: 18 (18 May 2019 05:33) (18 - 18)  SpO2: 99% (18 May 2019 05:33) (99% - 99%)    Labs:                        10.4   12.21 )-----------( 223      ( 17 May 2019 06:00 )             34.7     Blood Type: Apositive  Rubella: specimen received in lab  RPR: nonreactive    Medications  MEDICATIONS  (STANDING):  acetaminophen    Suspension .. 975 milliGRAM(s) Oral every 6 hours  diphtheria/tetanus/pertussis (acellular) Vaccine (ADAcel) 0.5 milliLiter(s) IntraMuscular once  heparin  Injectable 5000 Unit(s) SubCutaneous every 12 hours  ibuprofen  Suspension. 600 milliGRAM(s) Oral every 6 hours  prenatal multivitamin 1 Tablet(s) Oral daily    MEDICATIONS  (PRN):  diphenhydrAMINE 25 milliGRAM(s) Oral every 6 hours PRN Itching  docusate sodium 100 milliGRAM(s) Oral two times a day PRN Stool softening  glycerin Suppository - Adult 1 Suppository(s) Rectal at bedtime PRN Constipation  lanolin Ointment 1 Application(s) Topical every 6 hours PRN Sore Nipples  magnesium hydroxide Suspension 30 milliLiter(s) Oral two times a day PRN Constipation  oxyCODONE    IR 5 milliGRAM(s) Oral every 3 hours PRN Moderate Pain (4 - 6)  oxyCODONE    IR 5 milliGRAM(s) Oral once PRN Severe Pain (7 - 10)  simethicone 80 milliGRAM(s) Chew every 4 hours PRN Gas    Assessment  32y/o     Day#2    repeat post-operative  section delivery   Condition: Stable  Past Medical History significant for: umbilical hernia repair with mesh   Current Issues: EBL 700cc  Lung sounds clear bilaterally.  Breasts: soft, nontender  Nipples: intact  Abdomen: Soft, distended and nontender. Tympanic abdomen.  Bowel sounds present. Fundus firm  Abdominal incision: Clean, dry with steri strips easily removed. Abdominal incision slightly approximated, new steri strips applied.       Vaginal: Lochia light rubra  Extremities: Slight edema noted bilaterally and equal to lower extremities, nontender with no erythremic areas noted. Positive pedal pulses  Other relevant physical exam findings: none    Plan  Continue routine post-operative and postpartum care  Increase ambulation, analgesia PRN and pain medication protocol standing oxycodone, ibuprofen and acetaminophen.  Encouraged to wear abdominal binder for support and to use incentive spirometer  Discussed gas relieving measures

## 2019-05-18 NOTE — CHART NOTE - NSCHARTNOTEFT_GEN_A_CORE
Attending Note    Patient evaluated at bedside.  Patient c/o gas pains. No flatus. Ambulating, tolerating regular diet.  Breast and bottle feeding. Lochia light. No dysuria. out of bed to chair.    VS  98.9  P 87  /76    Heent nl  Abd soft, mildly distended.  Incision C/D/I  Ext no CCE  labs: postop H/H  10.4/34.7    A: POD#2 s/p RCS  P: Encouraged ambulation     recommend hot tea, MOM, mylicon prn     Routine postop care     Anticipate DC planning in am 5/19/2019     Stacey

## 2019-05-18 NOTE — DISCHARGE NOTE OB - ADDITIONAL INSTRUCTIONS
2-3wks. Call for Appt.    PLEASE  PRESCRIPTION FROM [facility] IN [place] ON [facility]

## 2019-05-18 NOTE — DISCHARGE NOTE OB - PATIENT PORTAL LINK FT
You can access the [facility], offered by [facility], by registering with the following website: [email or web address]

## 2019-05-19 VITALS
DIASTOLIC BLOOD PRESSURE: 82 MMHG | RESPIRATION RATE: 18 BRPM | SYSTOLIC BLOOD PRESSURE: 118 MMHG | TEMPERATURE: 98 F | OXYGEN SATURATION: 99 % | HEART RATE: 82 BPM

## 2019-05-19 RX ADMIN — Medication 600 MILLIGRAM(S): at 07:25

## 2019-05-19 RX ADMIN — Medication 600 MILLIGRAM(S): at 06:55

## 2019-05-19 RX ADMIN — HEPARIN SODIUM 5000 UNIT(S): 5000 INJECTION INTRAVENOUS; SUBCUTANEOUS at 06:30

## 2019-05-19 NOTE — PROGRESS NOTE ADULT - SUBJECTIVE AND OBJECTIVE BOX
Patient assessed at 0910.  Subjective  Pain: Patient denies any pain at the time of assessment. Pain being managed well by pain management protocol.  Complaints: None. Patient denies any headache, blur vision, dizziness and/or weakness/fatigue  Milestones:  Alert and orientedx3. Out of bed ambulating. Positive flatus. Negative bowel movement, denies urge. Voiding.  Tolerating a regular diet.  Infant feeding: breastfeeding and formula feeding  Feeding related issues and/or concerns: none    Vital Signs Last 24 Hrs  T(C): 36.9 (19 May 2019 06:38), Max: 36.9 (19 May 2019 06:38)  T(F): 98.4 (19 May 2019 06:38), Max: 98.4 (19 May 2019 06:38)  HR: 82 (19 May 2019 06:38) (82 - 85)  BP: 118/82 (19 May 2019 06:38) (118/82 - 122/78)  BP(mean): --  RR: 18 (19 May 2019 06:38) (18 - 18)  SpO2: 99% (19 May 2019 06:38) (99% - 100%)    Labs:                        10.4   12.21 )-----------( 223      ( 17 May 2019 06:00 )             34.7     Blood Type: Apositive  Rubella: specimen received in lab  RPR: nonreactive    Medications  MEDICATIONS  (STANDING):  acetaminophen    Suspension .. 975 milliGRAM(s) Oral every 6 hours  diphtheria/tetanus/pertussis (acellular) Vaccine (ADAcel) 0.5 milliLiter(s) IntraMuscular once  heparin  Injectable 5000 Unit(s) SubCutaneous every 12 hours  ibuprofen  Suspension. 600 milliGRAM(s) Oral every 6 hours  prenatal multivitamin 1 Tablet(s) Oral daily    MEDICATIONS  (PRN):  diphenhydrAMINE 25 milliGRAM(s) Oral every 6 hours PRN Itching  docusate sodium 100 milliGRAM(s) Oral two times a day PRN Stool softening  glycerin Suppository - Adult 1 Suppository(s) Rectal at bedtime PRN Constipation  lanolin Ointment 1 Application(s) Topical every 6 hours PRN Sore Nipples  magnesium hydroxide Suspension 30 milliLiter(s) Oral two times a day PRN Constipation  oxyCODONE    IR 5 milliGRAM(s) Oral every 3 hours PRN Moderate Pain (4 - 6)  oxyCODONE    IR 5 milliGRAM(s) Oral once PRN Severe Pain (7 - 10)  simethicone 80 milliGRAM(s) Chew every 4 hours PRN Gas    Assessment  34y/o     Day#3    repeat post-operative  section delivery   Condition: Stable  Past Medical History significant for: umbilical hernia repair with mesh   Current Issues: EBL 700cc  Lung sounds clear bilaterally.  Breasts: hernandez, nontender  Nipples: intact  Abdomen: Soft, distended and nontender. Tympanic abdomen.  Slight improvement of distention compared from yesterday assessment. Bowel sounds present. Fundus firm  Abdominal incision: Clean, dry with steri strips easily removed. Abdominal incision slightly approximated, new steri strips applied.       Vaginal: Lochia light rubra  Extremities: Slight edema noted bilaterally and equal to lower extremities, nontender with no erythremic areas noted. Positive pedal pulses  Other relevant physical exam findings: none    Plan  Continue routine post-operative and postpartum care  Increase ambulation, analgesia PRN and pain medication protocol standing oxycodone, ibuprofen and acetaminophen.  Encouraged to wear abdominal binder for support and to use incentive spirometer  Discussed breast/nipple/engorgement care for a breastfeeding mother.   Discharge to home today. Discussed discharge planning. Patient assessed at 0910.  Subjective  Pain: Patient denies any pain at the time of assessment. Pain being managed well by pain management protocol.  Complaints: None. Patient denies any headache, blur vision, dizziness, weakness/fatigue, shortness of breath and/or difficulties breathing.  Milestones:  Alert and orientedx3. Out of bed ambulating. Positive flatus. Negative bowel movement, denies urge. Voiding.  Tolerating a regular diet.  Infant feeding: breastfeeding and formula feeding  Feeding related issues and/or concerns: none    Vital Signs Last 24 Hrs  T(C): 36.9 (19 May 2019 06:38), Max: 36.9 (19 May 2019 06:38)  T(F): 98.4 (19 May 2019 06:38), Max: 98.4 (19 May 2019 06:38)  HR: 82 (19 May 2019 06:38) (82 - 85)  BP: 118/82 (19 May 2019 06:38) (118/82 - 122/78)  BP(mean): --  RR: 18 (19 May 2019 06:38) (18 - 18)  SpO2: 99% (19 May 2019 06:38) (99% - 100%)    Labs:                        10.4   12.21 )-----------( 223      ( 17 May 2019 06:00 )             34.7     Blood Type: Apositive  Rubella: specimen received in lab  RPR: nonreactive    Medications  MEDICATIONS  (STANDING):  acetaminophen    Suspension .. 975 milliGRAM(s) Oral every 6 hours  diphtheria/tetanus/pertussis (acellular) Vaccine (ADAcel) 0.5 milliLiter(s) IntraMuscular once  heparin  Injectable 5000 Unit(s) SubCutaneous every 12 hours  ibuprofen  Suspension. 600 milliGRAM(s) Oral every 6 hours  prenatal multivitamin 1 Tablet(s) Oral daily    MEDICATIONS  (PRN):  diphenhydrAMINE 25 milliGRAM(s) Oral every 6 hours PRN Itching  docusate sodium 100 milliGRAM(s) Oral two times a day PRN Stool softening  glycerin Suppository - Adult 1 Suppository(s) Rectal at bedtime PRN Constipation  lanolin Ointment 1 Application(s) Topical every 6 hours PRN Sore Nipples  magnesium hydroxide Suspension 30 milliLiter(s) Oral two times a day PRN Constipation  oxyCODONE    IR 5 milliGRAM(s) Oral every 3 hours PRN Moderate Pain (4 - 6)  oxyCODONE    IR 5 milliGRAM(s) Oral once PRN Severe Pain (7 - 10)  simethicone 80 milliGRAM(s) Chew every 4 hours PRN Gas    Assessment  32y/o     Day#3    repeat post-operative  section delivery   Condition: Stable  Past Medical History significant for: umbilical hernia repair with mesh   Current Issues: EBL 700cc  Lung sounds clear bilaterally.  Breasts: hernandez, nontender  Nipples: intact  Abdomen: Soft, distended and nontender. Tympanic abdomen.  Slight improvement of distention compared from yesterday assessment. Bowel sounds present. Fundus firm  Abdominal incision: Clean, dry with steri strips easily removed. Abdominal incision slightly approximated, new steri strips applied.       Vaginal: Lochia light rubra  Extremities: Slight edema noted bilaterally and equal to lower extremities, nontender with no erythremic areas noted. Positive pedal pulses  Other relevant physical exam findings: none    Plan  Continue routine post-operative and postpartum care  Increase ambulation, analgesia PRN and pain medication protocol standing oxycodone, ibuprofen and acetaminophen.  Encouraged to wear abdominal binder for support and to use incentive spirometer  Discussed breast/nipple/engorgement care for a breastfeeding mother.   Discharge to home today. Discussed discharge planning.

## 2019-05-22 LAB
RUBV IGG SER-ACNC: 2 INDEX — SIGNIFICANT CHANGE UP
RUBV IGG SER-IMP: POSITIVE — SIGNIFICANT CHANGE UP

## 2019-11-08 ENCOUNTER — APPOINTMENT (OUTPATIENT)
Dept: OPHTHALMOLOGY | Facility: CLINIC | Age: 34
End: 2019-11-08

## 2019-11-13 ENCOUNTER — EMERGENCY (EMERGENCY)
Facility: HOSPITAL | Age: 34
LOS: 1 days | Discharge: HOME | End: 2019-11-13
Admitting: EMERGENCY MEDICINE
Payer: MEDICAID

## 2019-11-13 VITALS
TEMPERATURE: 98 F | RESPIRATION RATE: 18 BRPM | DIASTOLIC BLOOD PRESSURE: 87 MMHG | OXYGEN SATURATION: 100 % | SYSTOLIC BLOOD PRESSURE: 138 MMHG | HEART RATE: 54 BPM

## 2019-11-13 DIAGNOSIS — Z33.2 ENCOUNTER FOR ELECTIVE TERMINATION OF PREGNANCY: Chronic | ICD-10-CM

## 2019-11-13 DIAGNOSIS — Z98.890 OTHER SPECIFIED POSTPROCEDURAL STATES: Chronic | ICD-10-CM

## 2019-11-13 DIAGNOSIS — Z98.51 TUBAL LIGATION STATUS: Chronic | ICD-10-CM

## 2019-11-13 PROCEDURE — 70481 CT ORBIT/EAR/FOSSA W/DYE: CPT | Mod: 26

## 2019-11-13 PROCEDURE — 99284 EMERGENCY DEPT VISIT MOD MDM: CPT

## 2019-11-13 RX ORDER — NEOMYCIN/POLYMYXIN B/DEXAMETHA 0.1 %
1 SUSPENSION, DROPS(FINAL DOSAGE FORM)(ML) OPHTHALMIC (EYE) ONCE
Refills: 0 | Status: COMPLETED | OUTPATIENT
Start: 2019-11-13 | End: 2019-11-13

## 2019-11-13 RX ORDER — POLYMYXIN B SULF/TRIMETHOPRIM 10000-1/ML
1 DROPS OPHTHALMIC (EYE) ONCE
Refills: 0 | Status: COMPLETED | OUTPATIENT
Start: 2019-11-13 | End: 2019-11-13

## 2019-11-13 RX ADMIN — Medication 1 TABLET(S): at 23:48

## 2019-11-13 RX ADMIN — Medication 1 DROP(S): at 23:19

## 2019-11-13 RX ADMIN — Medication 1 DROP(S): at 21:20

## 2019-11-13 NOTE — ED ADULT TRIAGE NOTE - CHIEF COMPLAINT QUOTE
pt c/o eye redness and purulent drainage to both eyes states all three of her children have pink eye. no blurred vision or diziness. denies pmh.

## 2019-11-13 NOTE — ED PROVIDER NOTE - PATIENT PORTAL LINK FT
You can access the FollowMyHealth Patient Portal offered by [facility] by registering at the following website: [email or web address]. By joining [employer]’s FollowMyHealth portal, you will also be able to view your health information using other applications (apps) compatible with our system.

## 2019-11-13 NOTE — ED PROVIDER NOTE - CARE PLAN
Principal Discharge DX:	Acute bacterial conjunctivitis of both eyes Principal Discharge DX:	Acute bacterial conjunctivitis of both eyes  Secondary Diagnosis:	Preseptal cellulitis

## 2019-11-13 NOTE — ED PROVIDER NOTE - PSH
delivery delivered  10/25/2007 F 10lbs and 2011 M 9lbs  H/O tubal ligation    H/O umbilical hernia repair    Termination of pregnancy (fetus)  D&C

## 2019-11-13 NOTE — ED PROVIDER NOTE - CLINICAL SUMMARY MEDICAL DECISION MAKING FREE TEXT BOX
35 yo F with no significant PMH presents to ER c/o painful red eye since last Friday, 7 days. pt is afebrile in ED, left eye redness w/ discharge, decreased vision, swelling of eyelid, photophobia and painful with eye movement concern for bacterial conjunctivitis vs orbital cellulitis, right eye conjunctivitis. Plan: CT orbital to r/o orbital cellulitis, Polytrim eye drops, opthalmology consult.

## 2019-11-13 NOTE — ED PROVIDER NOTE - PROGRESS NOTE DETAILS
EVER BREWER: pt seen by Opthalmology, recommends Maxitrol eye drops, 1 drop in both eyes, TID x7 days and f/u outpatient opthalmology. Patient reassessed, sitting comfortably in chair in NAD, denies any complaints. States feeling better, symptoms improved, states she has her own ophthalmologist. Pt is medically stable for discharge and follow up with PMD and ophthal. The patient was given verbal and written discharge instructions. Specifically, instructions when to return to the ED and when to seek follow-up from their pcp was discussed. Any specialty follow-up was discussed, including how to make an appointment.  Instructions were discussed in simple, plain language and was understood by the patient. The patient understands that should their symptoms worsen or any new symptoms arise, they should return to the ED immediately for further evaluation. All pt's questions were answered. Patient verbalizes understanding. PA DANIELLA: Ct show b/l preseptal cellulitis. will give Bactrim and Augmentin for 7 days. Pt is medically stable for discharge and follow up with Ophthalmology. The patient was given verbal and written discharge instructions. Specifically, instructions when to return to the ED and when to seek follow-up from their pcp was discussed. Any specialty follow-up was discussed, including how to make an appointment.  Instructions were discussed in simple, plain language and was understood by the patient. The patient understands that should their symptoms worsen or any new symptoms arise, they should return to the ED immediately for further evaluation. All pt's questions were answered. Patient verbalizes understanding.

## 2019-11-13 NOTE — ED PROVIDER NOTE - OBJECTIVE STATEMENT
33 yo F with no significant PMH presents to ER c/o 35 yo F with no significant PMH presents to ER c/o painful red eye since last Friday, 7 days. Pt states having itchiness in left eye last Friday a/w yellow discharge, burning pain since yesterday, blurry vision today, swelling around the eye for 4 days. Pt reports right eye redness started today. Admits 3 of your kids have pink eye. reports she was initially seen & evaluated at the ophthalmologist with her kid last Friday and yesterday for follow up, but told to continue artificial eye drops. Admits nasal congestion yesterday. Denies any fever, chills, headache, n/v/d, facial pain, cough, chest pain, sob, recent travel, eye injury or any other complaints. denies contact use.

## 2019-11-13 NOTE — ED PROVIDER NOTE - BILATERAL EYES
PHOTOPHOBIA/pupils equal, round, and reactive to light/left eye led with swelling; right eye with tearing./CONJUNCTIVA ERYTHEMA

## 2019-11-13 NOTE — ED ADULT NURSE NOTE - OBJECTIVE STATEMENT
Pt received in RW, 34Y F Aox3, ambulatory. Pt arrives c/o swelling and discharge from bilateral eyes. Pt reports recently seen at [facility] for conjuctivisit and dc with eyedrops. Pt reports eyes have gotten worse since and is not having difficulty seeing due to swelling. Pt denies any other complaints. Pt resp even and unlabored. Pt appears in NAD. Pt IV placed 20G Rt AC for CT, pt family at bedside will continue to monitor.

## 2019-11-13 NOTE — ED PROVIDER NOTE - NSFOLLOWUPINSTRUCTIONS_ED_ALL_ED_FT
Spiral Flap Text: The defect edges were debeveled with a #15 scalpel blade.  Given the location of the defect, shape of the defect and the proximity to free margins a spiral flap was deemed most appropriate.  Using a sterile surgical marker, an appropriate rotation flap was drawn incorporating the defect and placing the expected incisions within the relaxed skin tension lines where possible. The area thus outlined was incised deep to adipose tissue with a #15 scalpel blade.  The skin margins were undermined to an appropriate distance in all directions utilizing iris scissors. Rest, drink plenty of fluids.  Advance activity as tolerated. Use Maxitrol eye drops in both eyes, one drop in each affected eye, three times a day for 7 days. Follow up with your primary care physician and Ophthalmologist in 48-72 hours- bring copies of your results.  Return to the ER for worsening or persistent symptoms, and/or ANY NEW OR CONCERNING SYMPTOMS. If you have issues obtaining follow up, please call: [phone] to obtain a doctor or specialist who takes your insurance in your area. Rest, drink plenty of fluids.  Advance activity as tolerated. Warm compress as needed for both eyes. Always wash your hands after touching your eye. Use Maxitrol eye drops in both eyes, one drop in each affected eye, three times a day for 7 days. Take Augmentin 875mg every 12 hours a day for 7 days, finish this antibiotic. Take Bactrim one tablet every 12 hours for 7 days, finish this antibiotic. Follow up with your primary care physician and Ophthalmologist in 48-72 hours- bring copies of your results.  Return to the ER for worsening or persistent symptoms, and/or ANY NEW OR CONCERNING SYMPTOMS. If you have issues obtaining follow up, please call: [phone] to obtain a doctor or specialist who takes your insurance in your area.

## 2019-11-14 NOTE — CONSULT NOTE ADULT - SUBJECTIVE AND OBJECTIVE BOX
[facility] Ophthalmology Consult Note    HPI: 33 yo F presented in ED for red and swollen eyes. She said her daughter had swollen eyes for about two weeks then she began to have the redness and swollen. She also has running nose recently. Complained blurred vision and itching with worsening redness, She saw eye doctor a few days ago and was told to have pink eye. She was given artificial tears. She did not see improvement and the swollen was worse than yesterday and she came into ED.     PMH: None  Meds: In HPI  POcHx (including surgeries/lasers/trauma):  none  Drops: artificial tears  FamHx: none      Mood and Affect Appropriate ( x ),  Oriented to Time, Place, and Person x 3 ( x )    Ophthalmology Exam    Visual acuity (sc): 20/70 OD; 20/100 OS near  Pupils: PERRL OU, no APD  Ttono: STP OU,   Extraocular movements (EOMs): grossly full OU,   Confrontational Visual Field (CVF):  full  Color Plates: unable to cooperate due to pain and irritation     External:    Lids/Lashes/Lacrimal Ducts: edema of upper eyelid, OD 1+ OS 3+; preauricular lymph node tenderness left side.   Sclera/Conjunctiva:  diffusely injection 3+, edema 1+, subconj heme in the left eye  Cornea: Cl OU, no staining, no KP  Anterior Chamber: D+Q OU, no cells, no flare   Iris:  Flat OU  Lens:  Cl OU

## 2019-11-14 NOTE — CONSULT NOTE ADULT - ASSESSMENT
Assessment and Plan:  - History, eye exam support acute viral conjunctivitis both eyes, cornea is clear, A/C is clear.   - maxitrol eye drop Tid both eyes, talked to the patient about the side effect of steroid and emphosized importance of follow up of within 2 days. taper the steroid depending on the follow up  - educated about how contagious it is and precausious    Follow-Up:  Patient should follow up his/her ophthalmologist or in the [facility]  [address] [place] [address].  [place] [address] [phone]

## 2019-11-15 ENCOUNTER — APPOINTMENT (OUTPATIENT)
Dept: OPHTHALMOLOGY | Facility: CLINIC | Age: 34
End: 2019-11-15

## 2019-11-18 NOTE — ED ADULT TRIAGE NOTE - NS ED TRIAGE AVPU SCALE
Alert-The patient is alert, awake and responds to voice. The patient is oriented to time, place, and person. The triage nurse is able to obtain subjective information. 18-Nov-2019 16:28

## 2020-06-03 NOTE — ED ADULT TRIAGE NOTE - SPO2 (%)
Internal Medicine Acute Visit    Chief Complaint   Patient presents with   • Headache   • Sinusitis   • Earache        HPI  Ms. Bruner was evaluated today via video visit for headache, dizziness, sinus pain and congestion, ear pain, post nasal drainage, and cervical adenopathy for the last 2 weeks. She has been taking xyzal daily for allergies as well as chlorpheniramine-pseudophedrine daily. She has not had SOA, fever, cough, sore throat, loss of taste or smell, diarrhea or nausea.       Review of Systems  Review of Systems   Constitutional: Negative.    HENT: Positive for congestion, ear pain, postnasal drip, rhinorrhea, sinus pressure and swollen glands. Negative for sore throat.    Respiratory: Negative.    Gastrointestinal: Negative for diarrhea.        Medications  Current Outpatient Medications on File Prior to Visit   Medication Sig Dispense Refill   • Cholecalciferol 22852 units wafer Take 1 dose by mouth 1 (One) Time Per Week. 4 wafer 11   • EPINEPHrine (EPIPEN) 0.3 MG/0.3ML solution auto-injector injection Inject 0.3 ML into the shoulder, thigh, or buttocks for 1 dose. 2 each 11   • SYNTHROID 200 MCG tablet TAKE 1 TABLET BY MOUTH DAILY 90 tablet 3   • SYNTHROID 25 MCG tablet Take 1 tablet by mouth Daily. MEL 90 tablet 3     No current facility-administered medications on file prior to visit.         Allergies  Allergies   Allergen Reactions   • Adhesive Tape Anaphylaxis and Rash     Allergic to some adhesives but not others. Has had rash to some, anaphylaxis to adhesive used with eyelash extension.   • Peanuts [Peanut Oil] Anaphylaxis   • Sulfa Antibiotics Rash       PMH  Past Medical History:   Diagnosis Date   • Abnormal liver function test 8/24/2016   • Acute bronchitis 10/11/2016   • Bell's palsy    • GERD (gastroesophageal reflux disease)    • Hashimoto's disease    • History of gallstones    • History of ovarian cyst    • Lactose intolerance    • Microscopic hematuria 11/8/2017   • Temporary high  blood pressure 11/8/2017   • Thyroid disease      Objective  There were no vitals taken for this visit.     Physical Exam  Physical Exam   Constitutional: She is oriented to person, place, and time. She appears well-developed and well-nourished. She appears distressed (emotional due to need to quarantine).   HENT:   Head: Normocephalic and atraumatic.   Right Ear: External ear normal.   Left Ear: External ear normal.   Nose: Nose normal.   Eyes: Conjunctivae are normal.   Pulmonary/Chest: Effort normal. No respiratory distress.   Neurological: She is alert and oriented to person, place, and time.       Results  Results for orders placed or performed in visit on 12/17/19   T4, Free   Result Value Ref Range    Free T4 1.38 0.93 - 1.70 ng/dL   TSH   Result Value Ref Range    TSH 9.320 (H) 0.270 - 4.200 uIU/mL   Vitamin D 25 Hydroxy   Result Value Ref Range    25 Hydroxy, Vitamin D 33.5 30.0 - 100.0 ng/ml        Assessment and Plan  Kanwal was seen today for headache, sinusitis and earache.    Diagnoses and all orders for this visit:    Acute non-recurrent sinusitis of other sinus  - Symptoms consistent with sinusitis, unable to rule out COVID19  - Will start treatment with Augmentin 875mg BID x7d and send to [facility] for COVID19 swab.   - Advised to self quarantine until advised of results.  - Can continue antihistamine and decongestant    Health Maintenance  - Pap smear: s/p hysterectomy, benign pathology.  - Mammogram: 1/2020 BIRADS 1.  - Colonoscopy: Indicated at age 48.  - Immunizations: Reports Tdap ~5y ago with significant local response so hesitant to repeat. Hep A x2.  - Depression screening: PHQ2 negative 9/2019.       Return in about 3 months (around 9/3/2020) for Follow up 30 minutes please.     This patient has consented to a telehealth visit via video. The visit was scheduled to comply with patient safety concerns in accordance with CDC recommendations.  I spent 15 minutes in total including but not limited  to the 15 minutes spent in direct conversation with this patient.      100

## 2020-08-18 NOTE — OB RN PATIENT PROFILE - SURGICAL SITE INCISION
Please take the antibiotics as prescribed, do not skip any doses, and finish the entire prescription.  Keep the area clean and dry.  You may take Tylenol and ibuprofen as needed for pain.  Follow-up with gynecology.  A referral was sent for you today.  Return to urgent care or report to the ER for any worsening symptoms including high fevers, severe pain, redness and swelling, increased drainage, and any other concerns.    Patient Education     Bartholin’s Cyst: Common Treatments    The Bartholin’s glands are a pair of very small glands found inside the labia (vaginal lips). There is one gland on either side. The glands produce fluid to help keep the vagina moist. When the opening of a Bartholin’s gland becomes blocked, the gland may swell and form a cyst. The cyst may vary in size from small to large (1 to 3 cm in size). It may feel like a firm lump within the labia.  Treatment depends on various factors. These include the size of the cyst, whether it causes symptoms, and whether it’s infected. Small or uninfected cysts don’t usually need treatment. Large cysts and those that are painful or infected will likely need treatment. Below are some common treatments that may be done:  · Incision and drainage. In this procedure, the area over the cyst is numbed, cut, and drained. Often, a thin tube (catheter) with a balloon on the end is then inserted into the empty cyst. This allows for further drainage of fluid. The catheter is left in place for 4 to 6 weeks. It is then removed by the healthcare provider.  · Marsupialization. With this procedure, the area over the cyst is numbed. The cyst is cut and drained. The edges of the skin are then stitched to create a small opening that will allow for further drainage of fluid.  If you have recurrent cysts, other treatments may be needed. Your provider can tell you more about these options.  If your cyst is infected, your healthcare provider may prescribe antibiotics. Most  infections of Bartholin’s cysts are due to bacteria that are normally present in the vagina. Sometimes, the bacteria may have been passed to you during sex. This is called a sexually transmitted infection (STI). A test (culture) of the fluid can confirm if you have an STI.  Home care  Medicines  · If antibiotics are prescribed, be sure to take them exactly as directed. Don’t stop taking the medicine, even if you start to feel better. If the cause of your infection is an STI, any sexual partners you have will also need to be tested and treated.  · If you have pain, use over-the-counter pain medicine as directed. If needed, stronger pain medicines can be prescribed.   General care  · To help relieve discomfort, you may be advised to take sitz baths for the next few days. For this, you soak in bath filled with 2 to 3 inches of warm water for 10 to 15 minutes, a few times a day.  · Avoid having sex until the cyst has healed. If the cause of your infection is an STI, also avoid having sex until you and any partners you have are done with treatment.  Follow-up care  Follow up with your healthcare provider, or as directed. Be sure to keep all appointments. These are needed to ensure that you are recovering well after your treatment. If you have a catheter, your provider will let you know when to return to have it removed.  When to seek medical advice  Call your healthcare provider right away if any of these occur:  · Fever does not go down or worsens  · Increased redness, pain, swelling, or foul-smelling drainage from the cyst or the area around it  · Pain in the lower abdomen   · New rash or joint pain  · Catheter falls out before the scheduled time to remove it  Date Last Reviewed: 11/1/2017  © 9810-8758 [employer]. [address] [place] [address], [place] [address]. All rights reserved. This information is not intended as a substitute for professional medical care. Always follow your healthcare professional's  instructions.            no

## 2020-09-03 NOTE — OB RN INTRAOPERATIVE NOTE - NS_DELIVERYRN_OBGYN_ALL_OB_FT
[employer] Add [J22  QEIR] [address] [facility] [address] [facility] Emergency Department Emergency Medicine  If symptoms worsen [address] [facility]  AN ED, [address], [place], [place], [address]          1640 PRIYANKA Hough

## 2020-09-04 NOTE — ED ADULT NURSE NOTE - NSFALLRSKASSESSTYPE_ED_ALL_ED
If an incision was performed as part of your procedure, contact your doctor with any pain, swelling, redness, or other concerns you may have about that area. Initial (On Arrival)

## 2020-10-26 ENCOUNTER — RESULT REVIEW (OUTPATIENT)
Age: 35
End: 2020-10-26

## 2021-07-15 NOTE — OB PROVIDER TRIAGE NOTE - NSADDLPREG1_OBGYN_ALL_OB
I concur with the Admission Order and I certify that services are provided in accordance with Section 42 CFR § 412.3 ADD ADDITIONAL PRIOR PREGNANCY INFORMATION...

## 2022-01-04 NOTE — OB NEONATOLOGY/PEDIATRICIAN DELIVERY SUMMARY - BABY A: APGAR 1 MIN RESP RATE, DELIVERY
Nephrology Progress Note    Reports feeling a little bit better today able to swallow easier, less pain.      ROS:  Resp: Cough  CV:negative  GI:negative  Gu:negative    MEDICATIONS:  • sodium chloride (PF)  10 mL Injection 2 times per day   • metoPROLOL succinate  25 mg Oral BID   • alum-mag hydroxide+simethicone/lidocaine viscous (2:1)  15 mL Oral 4x Daily AC & HS   • lisinopril  10 mg Oral Daily   • meropenem (MERREM) IVPB  500 mg Intravenous Q8H   • ferrous sulfate  325 mg Oral Daily with breakfast   • gabapentin  300 mg Oral 2 times per day   • sodium chloride (PF)  2 mL Intracatheter 2 times per day   • [Held by provider] enoxaparin  30 mg Subcutaneous Daily   • docusate sodium  100 mg Oral Daily   • ipratropium-albuterol  3 mL Nebulization 4x Daily Resp   • pantoprazole  40 mg Oral Nightly   • lidocaine  1 patch Transdermal Daily          Physical Examination:  Vital Signs:    Visit Vitals  BP (!) 168/77   Pulse 87   Temp 97.3 °F (36.3 °C) (Oral)   Resp 20   Ht 5' 4\" (1.626 m)   Wt 48.8 kg (107 lb 9.4 oz)   SpO2 95%   BMI 18.47 kg/m²     General:  No acute distress.  Conversant.  Head:  Normocephalic-atraumatic.   Neck:  Trachea is midline. No JVD.  Eyes:  Normal conjunctivae.    ENT:   Mucous membranes are moist.    Cardiovascular:  S1, S2 auscultated.  Regular rate and rhythm.  Bilateral peripheral pulses are intact.  No edema.  Respiratory:   Bilateral breath sounds heard. .  Symmetric chest wall expansion.  Respirations are non-labored.    Gastrointestinal:  Soft and nontender.  Non-distended.  Positive bowel sounds.    Genitourinary: No CVA tenderness.    Musculoskeletal:  No joint swelling.   Skin: Warm and dry.  No rash noted.  Neurologic:   CN II-XII grossly intact.  Psychiatric:   Alert and oriented X 3.  Calm.  Cooperative.       I/O's    Intake/Output Summary (Last 24 hours) at 1/4/2022 1611  Last data filed at 1/4/2022 1300  Gross per 24 hour   Intake 1020 ml   Output 1200 ml   Net -180 ml        Labs:    Recent Labs   Lab 01/03/22  1638 01/03/22 0447 01/03/22 0447 01/02/22  0634 01/02/22  0634   SODIUM 137  --  137  --  138   POTASSIUM 3.2*  --  3.7  --  3.6   CHLORIDE 104  --  105  --  107   CO2 27  --  26  --  26   BUN 23*  --  24*  --  29*   CREATININE 0.84  --  0.91  --  0.84   GLUCOSE 135*   < > 111*   < > 104*   CALCIUM 8.8  --  8.7  --  9.2    < > = values in this interval not displayed.      Recent Labs   Lab 01/03/22 1638 01/03/22 0447 01/02/22  0634 12/31/21 0514 12/30/21 0524 12/29/21 0524 12/29/21  0524   SODIUM 137 137 138   < > 133*   < > 134*   CHLORIDE 104 105 107   < > 104   < > 103   CO2 27 26 26   < > 23   < > 23   BUN 23* 24* 29*   < > 54*   < > 83*   CREATININE 0.84 0.91 0.84   < > 0.96*   < > 1.37*   CALCIUM 8.8 8.7 9.2   < > 9.3   < > 8.8   ALBUMIN  --   --   --   --  1.2*  --  1.2*   BILIRUBIN  --   --   --   --  0.4  --  0.3   ALKPT  --   --   --   --  89  --  82   GPT  --   --   --   --  9  --  11   AST  --   --   --   --  9  --  13   GLUCOSE 135* 111* 104*   < > 100*   < > 100*    < > = values in this interval not displayed.      Recent Labs   Lab 01/04/22 0446   WBC 16.4*   RBC 2.87*   HGB 8.3*   HCT 25.6*   *        Imaging    XR CHEST PA OR AP 1 VIEW   Final Result   FINDINGS/IMPRESSION: On this AP portable upright chest patchy interstitial   infiltrate is seen at the left lung base.  Increasing infiltrate is seen in   the right lower lobe.  There is a relatively large cavitary irregular area   within the right upper lobe with extensive consolidation at the right lung   apex.  There is a suggestion of an air-fluid level in this area on the   possibility of a lung abscess cannot be excluded.  The heart and pulmonary   vascularity are within normal limits.  There is prominence of the right   hilum and adenopathy cannot be excluded.      Electronically Signed by: BHUMIKA JEROME M.D.    Signed on: 1/4/2022 9:29 AM          EGD   Final Result      CT NECK SOFT  TISSUE W CONTRAST   Final Result      1.  Heterogeneously enhancing soft tissue involving the right hilum and   mediastinum which is highly suspicious for conglomerate right hilar   tumor/neoplasm and associated adenopathy.   2.  Large cavitation involving the visualized right lung apex with adjacent   fibrotic change.      Electronically Signed by: JEF HARO M.D.    Signed on: 12/31/2021 8:25 PM          CT ABDOMEN PELVIS W CONTRAST   Final Result      1.  No evidence of a retroperitoneal hematoma as clinically questioned.   2.  Abnormal morphology to the stomach which has a \"leather bottle\"   appearance and can be seen with underlying linitis plastica.  Consider   further evaluation with upper endoscopy.   3.  Small to moderate bilateral pleural effusions with mixed interstitial   opacities at the visualized right lung base.   4.  Severe atheromatous disease of the abdominal aorta and iliac arterial   vasculature.   5.  Small volume pelvic free fluid with moderate anasarca.   6.  Scattered diverticulosis of the sigmoid colon.      Electronically Signed by: JEF HARO M.D.    Signed on: 12/31/2021 8:13 PM          XR CHEST PA OR AP 1 VIEW   Final Result   Similar appearance to prior study as described, including   diffuse opacities throughout the right lung and large multilobulated   cavitary lesion.      Electronically Signed by: KASIE URENA M.D.    Signed on: 12/31/2021 2:12 PM          US KIDNEY(S) AND BLADDER URINARY TRACT   Final Result   1.    No evidence of hydronephrosis.    2.   Distended gallbladder containing sludge and small stones.       Electronically Signed by: BHUMIKA JEROME M.D.    Signed on: 12/28/2021 10:36 PM          US VASC JUGULAR AND SUBCLAVIAN DUPLEX   Final Result   1.   No evidence of deep venous thrombosis in the right internal jugular   and right subclavian vein.      Electronically Signed by: BHUMIKA JEROME M.D.    Signed on: 12/28/2021 10:35 PM          CT CHEST WO  CONTRAST   Final Result   Bilateral areas of consolidation and patchy nodular and   groundglass type opacities throughout both lungs suspicious for multifocal   infectious/inflammatory process with some superimposed large cavitary area   /abscess involving significant portion of right upper lobe and extending   into superior segment of right lower lobe.      Electronically Signed by: KASIE URENA M.D.    Signed on: 12/27/2021 7:12 PM          XR CHEST PA OR AP 1 VIEW   Final Result   Multiple right lung opacities and suggestion of lung abscesses   in patient with reported fever.      Electronically Signed by: KASIE URENA M.D.    Signed on: 12/27/2021 5:38 PM                Assessment and Plan:     RIGO   -Prerenal azotemia, hypovolemia, now possibly ischemic ATN due to critically low hemoglobin  -renal function normalized  -Significant nephritic range proteinuria,cont lisinopril 10 mg daily, uptitrate if tolerated, repeat protein studies as outpatient   -Continue to encourage oral intake  -Avoid any nephrotoxins  -Check daily labs and urine output strictly     HTN -blood pressure remains elevated, increase lisinopril and metoprolol dose now    Anemia  -Monitor H&H, transfuse for hemoglobin less than 7 g/dL  -Iron deficiency noted, receiving Venofer loading  -S/p EGD , biopsy results pend     Hypercalcemia   -noted on admission labs, improved with IV hydration, due to low solute intake, hypovolemic state, rule out malignancy,   - work-up for hypercalcemia, revealed normal ionized calcium, Phos, 25 oh vitamin D and PTH level  - PTH RP pending  - vitamin 125 dihydroxy D levels elevated, suggestive of granulomatous disease or possible malignancy  -Monoclonal protein kappa and lambda spikes, normal ratio   -Appreciate hematology evaluation and recommendations    Hyponatremia  Resolved    Pneumonia  -Management per primary service, improving on antibiotic      (2) good, crying

## 2022-07-13 NOTE — OB RN DELIVERY SUMMARY - AMNIOTIC FLUID COLOR, LABOR
Inpatient appropriate    Admission Status; Secondary Review Determination       Under the authority of the Utilization Management Committee, the utilization review process indicated a secondary review on the above patient. The review outcome is based on review of the medical records, discussions with staff, and applying clinical experience noted on the date of the review.     (x) Inpatient Status Appropriate - This patient's medical care is consistent with medical management for inpatient care and reasonable inpatient medical practice.     RATIONALE FOR DETERMINATION   63 year old male with hx of IDDM, CAD, CHF 2/2 ICM, EtOH/tobacco use disorder in remission, chronic pain/neuropathy admitted on 7/12/2022 for choledocholithiasis with elevated LFTs and CT abdomen with enlarged CBD. Pt underwent ERCP with stone extraction and lap cholecystectomy on 7/12. Pt now found to have bacteremia and started on IV abx. Spoke with provider pt Will remain hospitalized until bacteremia resolved   At the time of admission with the information available to the attending physician more than 2 nights Hospital complex care was anticipated, based on patient risk of adverse outcome if treated as outpatient and complex care required. Inpatient admission is appropriate based on the Medicare guidelines.     The information on this document is developed by the utilization review team in order for the business office to ensure compliance. This only denotes the appropriateness of proper admission status and does not reflect the quality of care rendered.   The definitions of Inpatient Status and Observation Status used in making the determination above are those provided in the CMS Coverage Manual, Chapter 1 and Chapter 6, section 70.4.   Sincerely,   Harshad White MD  Utilization Review  Physician Advisor  [facility].         clear

## 2022-08-25 NOTE — OB NEONATOLOGY/PEDIATRICIAN DELIVERY SUMMARY - BABY A: APGAR 5 MIN REFLEX IRRITABILITY, DELIVERY
Pt called back. I reviewed his event monitor with him and his wife who was on speaker phone. He understands the importance of following up in our office to see Dr. Nunn. I told him he should not be driving and our office will call him to arrange appointment with Dr Scales this week or sometime next week. If he has recurrent syncope call 911.    (2) cough or sneeze

## 2022-10-04 NOTE — OB PST NOTE - NO PERTINENT FAMILY HISTORY
-- DO NOT REPLY / DO NOT REPLY ALL --  -- Message is from Engagement Center Operations (ECO) --    ONLY TO BE USED WITHIN A REFILL MEDICATION ENCOUNTER    Med Refill  Is the patient currently having Emergent symptoms?: No    Name of medication requested: losartan-hydrochlorothiazide (HYZAAR) 50-12.5 MG per tablet and atorvastatin (LIPITOR) 40 MG tablet    Has patient contacted the pharmacy? Yes    Is this the first request for the medication in the last 48 hours?: Yes      Patient is requesting a medication refill - medication is on active list      Full name of the provider who ordered the medication: TOMMY OCONNELL    [facility] site name / Account # for provider: [facility] - [address]     Preferred Pharmacy: Pharmacy  [id number] [address] [place] [address]    Patient confirmed the above pharmacy as correct?  Yes      Caller Information       Type Contact Phone/Fax    10/04/2022 03:30 PM CDT Phone (Incoming) Esperanza Greco (Self) [phone] (M)          Alternative phone number: [phone]    Can a detailed message be left?: Yes    Patient is completely out of medication: verify if patient is currently experiencing symptoms. If patient is symptomatic, proceed with front end triage instead of medication refill. If patient is not symptomatic but is completely out of medication, reny as High priority when routing. Inform patient: “Please call back with any questions or concerns and if your condition becomes life threatening, you should seek immediate medical assistance by calling 911 or going to the Emergency Department for evaluation.”    Inform all patients: \"Please be aware the return phone call may come from an unidentified or out of state phone number and your refill request will be addressed as soon as the clinical team reviews your message.\"   <<----- Click to add NO pertinent Family History

## 2022-10-24 NOTE — OB PROVIDER TRIAGE NOTE - NS_FETALHEARTRATE_OBGYN_ALL_OB_FT
Encounter addended by: Gisela Murray, PT on: 10/24/2022 9:45 AM   Actions taken: Episode resolved, Clinical Note Signed 140

## 2022-12-26 ENCOUNTER — EMERGENCY (EMERGENCY)
Facility: HOSPITAL | Age: 37
LOS: 1 days | Discharge: HOME | End: 2022-12-26
Attending: STUDENT IN AN ORGANIZED HEALTH CARE EDUCATION/TRAINING PROGRAM | Admitting: STUDENT IN AN ORGANIZED HEALTH CARE EDUCATION/TRAINING PROGRAM

## 2022-12-26 VITALS
TEMPERATURE: 98 F | HEART RATE: 73 BPM | DIASTOLIC BLOOD PRESSURE: 77 MMHG | RESPIRATION RATE: 18 BRPM | OXYGEN SATURATION: 100 % | SYSTOLIC BLOOD PRESSURE: 114 MMHG

## 2022-12-26 VITALS
HEART RATE: 90 BPM | DIASTOLIC BLOOD PRESSURE: 79 MMHG | OXYGEN SATURATION: 100 % | RESPIRATION RATE: 18 BRPM | TEMPERATURE: 101 F | SYSTOLIC BLOOD PRESSURE: 120 MMHG

## 2022-12-26 DIAGNOSIS — Z33.2 ENCOUNTER FOR ELECTIVE TERMINATION OF PREGNANCY: Chronic | ICD-10-CM

## 2022-12-26 DIAGNOSIS — Z98.890 OTHER SPECIFIED POSTPROCEDURAL STATES: Chronic | ICD-10-CM

## 2022-12-26 DIAGNOSIS — Z98.51 TUBAL LIGATION STATUS: Chronic | ICD-10-CM

## 2022-12-26 LAB
ALBUMIN SERPL ELPH-MCNC: 4.6 G/DL — SIGNIFICANT CHANGE UP (ref 3.3–5)
ALP SERPL-CCNC: 57 U/L — SIGNIFICANT CHANGE UP (ref 40–120)
ALT FLD-CCNC: 18 U/L — SIGNIFICANT CHANGE UP (ref 4–33)
ANION GAP SERPL CALC-SCNC: 13 MMOL/L — SIGNIFICANT CHANGE UP (ref 7–14)
APPEARANCE UR: CLEAR — SIGNIFICANT CHANGE UP
APTT BLD: 31.2 SEC — SIGNIFICANT CHANGE UP (ref 27–36.3)
AST SERPL-CCNC: 22 U/L — SIGNIFICANT CHANGE UP (ref 4–32)
BASE EXCESS BLDV CALC-SCNC: -0.6 MMOL/L — SIGNIFICANT CHANGE UP (ref -2–3)
BASOPHILS # BLD AUTO: 0.02 K/UL — SIGNIFICANT CHANGE UP (ref 0–0.2)
BASOPHILS NFR BLD AUTO: 0.3 % — SIGNIFICANT CHANGE UP (ref 0–2)
BILIRUB SERPL-MCNC: 0.6 MG/DL — SIGNIFICANT CHANGE UP (ref 0.2–1.2)
BILIRUB UR-MCNC: NEGATIVE — SIGNIFICANT CHANGE UP
BLOOD GAS VENOUS COMPREHENSIVE RESULT: SIGNIFICANT CHANGE UP
BUN SERPL-MCNC: 10 MG/DL — SIGNIFICANT CHANGE UP (ref 7–23)
CALCIUM SERPL-MCNC: 9.5 MG/DL — SIGNIFICANT CHANGE UP (ref 8.4–10.5)
CHLORIDE BLDV-SCNC: 103 MMOL/L — SIGNIFICANT CHANGE UP (ref 96–108)
CHLORIDE SERPL-SCNC: 102 MMOL/L — SIGNIFICANT CHANGE UP (ref 98–107)
CK SERPL-CCNC: 139 U/L — SIGNIFICANT CHANGE UP (ref 25–170)
CO2 BLDV-SCNC: 24.3 MMOL/L — SIGNIFICANT CHANGE UP (ref 22–26)
CO2 SERPL-SCNC: 21 MMOL/L — LOW (ref 22–31)
COLOR SPEC: SIGNIFICANT CHANGE UP
CREAT SERPL-MCNC: 0.78 MG/DL — SIGNIFICANT CHANGE UP (ref 0.5–1.3)
DIFF PNL FLD: NEGATIVE — SIGNIFICANT CHANGE UP
EGFR: 99 ML/MIN/1.73M2 — SIGNIFICANT CHANGE UP
EOSINOPHIL # BLD AUTO: 0.09 K/UL — SIGNIFICANT CHANGE UP (ref 0–0.5)
EOSINOPHIL NFR BLD AUTO: 1.5 % — SIGNIFICANT CHANGE UP (ref 0–6)
FLUAV AG NPH QL: DETECTED
FLUBV AG NPH QL: SIGNIFICANT CHANGE UP
GAS PNL BLDV: 131 MMOL/L — LOW (ref 136–145)
GLUCOSE BLDV-MCNC: 92 MG/DL — SIGNIFICANT CHANGE UP (ref 70–99)
GLUCOSE SERPL-MCNC: 97 MG/DL — SIGNIFICANT CHANGE UP (ref 70–99)
GLUCOSE UR QL: NEGATIVE — SIGNIFICANT CHANGE UP
HCO3 BLDV-SCNC: 23 MMOL/L — SIGNIFICANT CHANGE UP (ref 22–29)
HCT VFR BLD CALC: 41.2 % — SIGNIFICANT CHANGE UP (ref 34.5–45)
HCT VFR BLDA CALC: 42 % — SIGNIFICANT CHANGE UP (ref 34.5–46.5)
HGB BLD CALC-MCNC: 14.1 G/DL — SIGNIFICANT CHANGE UP (ref 11.5–15.5)
HGB BLD-MCNC: 13.5 G/DL — SIGNIFICANT CHANGE UP (ref 11.5–15.5)
IANC: 4.89 K/UL — SIGNIFICANT CHANGE UP (ref 1.8–7.4)
IMM GRANULOCYTES NFR BLD AUTO: 0.3 % — SIGNIFICANT CHANGE UP (ref 0–0.9)
INR BLD: 1.17 RATIO — HIGH (ref 0.88–1.16)
KETONES UR-MCNC: NEGATIVE — SIGNIFICANT CHANGE UP
LACTATE BLDV-MCNC: 1.6 MMOL/L — SIGNIFICANT CHANGE UP (ref 0.5–2)
LEUKOCYTE ESTERASE UR-ACNC: NEGATIVE — SIGNIFICANT CHANGE UP
LYMPHOCYTES # BLD AUTO: 0.53 K/UL — LOW (ref 1–3.3)
LYMPHOCYTES # BLD AUTO: 8.7 % — LOW (ref 13–44)
MAGNESIUM SERPL-MCNC: 1.6 MG/DL — SIGNIFICANT CHANGE UP (ref 1.6–2.6)
MCHC RBC-ENTMCNC: 27.3 PG — SIGNIFICANT CHANGE UP (ref 27–34)
MCHC RBC-ENTMCNC: 32.8 GM/DL — SIGNIFICANT CHANGE UP (ref 32–36)
MCV RBC AUTO: 83.2 FL — SIGNIFICANT CHANGE UP (ref 80–100)
MONOCYTES # BLD AUTO: 0.54 K/UL — SIGNIFICANT CHANGE UP (ref 0–0.9)
MONOCYTES NFR BLD AUTO: 8.9 % — SIGNIFICANT CHANGE UP (ref 2–14)
NEUTROPHILS # BLD AUTO: 4.89 K/UL — SIGNIFICANT CHANGE UP (ref 1.8–7.4)
NEUTROPHILS NFR BLD AUTO: 80.3 % — HIGH (ref 43–77)
NITRITE UR-MCNC: NEGATIVE — SIGNIFICANT CHANGE UP
NRBC # BLD: 0 /100 WBCS — SIGNIFICANT CHANGE UP (ref 0–0)
NRBC # FLD: 0 K/UL — SIGNIFICANT CHANGE UP (ref 0–0)
PCO2 BLDV: 35 MMHG — LOW (ref 39–42)
PH BLDV: 7.43 — SIGNIFICANT CHANGE UP (ref 7.32–7.43)
PH UR: 6 — SIGNIFICANT CHANGE UP (ref 5–8)
PHOSPHATE SERPL-MCNC: 3.4 MG/DL — SIGNIFICANT CHANGE UP (ref 2.5–4.5)
PLATELET # BLD AUTO: 264 K/UL — SIGNIFICANT CHANGE UP (ref 150–400)
PO2 BLDV: 57 MMHG — SIGNIFICANT CHANGE UP
POTASSIUM BLDV-SCNC: 5.6 MMOL/L — HIGH (ref 3.5–5.1)
POTASSIUM SERPL-MCNC: 4.3 MMOL/L — SIGNIFICANT CHANGE UP (ref 3.5–5.3)
POTASSIUM SERPL-SCNC: 4.3 MMOL/L — SIGNIFICANT CHANGE UP (ref 3.5–5.3)
PROT SERPL-MCNC: 8.1 G/DL — SIGNIFICANT CHANGE UP (ref 6–8.3)
PROT UR-MCNC: ABNORMAL
PROTHROM AB SERPL-ACNC: 13.6 SEC — HIGH (ref 10.5–13.4)
RBC # BLD: 4.95 M/UL — SIGNIFICANT CHANGE UP (ref 3.8–5.2)
RBC # FLD: 13.5 % — SIGNIFICANT CHANGE UP (ref 10.3–14.5)
RSV RNA NPH QL NAA+NON-PROBE: SIGNIFICANT CHANGE UP
SAO2 % BLDV: 89.4 % — SIGNIFICANT CHANGE UP
SARS-COV-2 RNA SPEC QL NAA+PROBE: SIGNIFICANT CHANGE UP
SODIUM SERPL-SCNC: 136 MMOL/L — SIGNIFICANT CHANGE UP (ref 135–145)
SP GR SPEC: 1.02 — SIGNIFICANT CHANGE UP (ref 1.01–1.05)
TSH SERPL-MCNC: 0.63 UIU/ML — SIGNIFICANT CHANGE UP (ref 0.27–4.2)
UROBILINOGEN FLD QL: SIGNIFICANT CHANGE UP
WBC # BLD: 6.09 K/UL — SIGNIFICANT CHANGE UP (ref 3.8–10.5)
WBC # FLD AUTO: 6.09 K/UL — SIGNIFICANT CHANGE UP (ref 3.8–10.5)

## 2022-12-26 PROCEDURE — 74177 CT ABD & PELVIS W/CONTRAST: CPT | Mod: 26,MA

## 2022-12-26 PROCEDURE — 99285 EMERGENCY DEPT VISIT HI MDM: CPT

## 2022-12-26 PROCEDURE — 71046 X-RAY EXAM CHEST 2 VIEWS: CPT | Mod: 26

## 2022-12-26 PROCEDURE — 72132 CT LUMBAR SPINE W/DYE: CPT | Mod: 26,MA

## 2022-12-26 RX ORDER — SODIUM CHLORIDE 9 MG/ML
1000 INJECTION INTRAMUSCULAR; INTRAVENOUS; SUBCUTANEOUS ONCE
Refills: 0 | Status: COMPLETED | OUTPATIENT
Start: 2022-12-26 | End: 2022-12-26

## 2022-12-26 RX ORDER — ONDANSETRON 8 MG/1
4 TABLET, FILM COATED ORAL ONCE
Refills: 0 | Status: COMPLETED | OUTPATIENT
Start: 2022-12-26 | End: 2022-12-26

## 2022-12-26 RX ORDER — ACETAMINOPHEN 500 MG
975 TABLET ORAL ONCE
Refills: 0 | Status: COMPLETED | OUTPATIENT
Start: 2022-12-26 | End: 2022-12-26

## 2022-12-26 RX ORDER — FAMOTIDINE 10 MG/ML
20 INJECTION INTRAVENOUS ONCE
Refills: 0 | Status: COMPLETED | OUTPATIENT
Start: 2022-12-26 | End: 2022-12-26

## 2022-12-26 RX ADMIN — FAMOTIDINE 20 MILLIGRAM(S): 10 INJECTION INTRAVENOUS at 17:07

## 2022-12-26 RX ADMIN — Medication 975 MILLIGRAM(S): at 17:07

## 2022-12-26 RX ADMIN — ONDANSETRON 4 MILLIGRAM(S): 8 TABLET, FILM COATED ORAL at 17:07

## 2022-12-26 RX ADMIN — SODIUM CHLORIDE 1000 MILLILITER(S): 9 INJECTION INTRAMUSCULAR; INTRAVENOUS; SUBCUTANEOUS at 17:08

## 2022-12-26 NOTE — ED PROVIDER NOTE - PHYSICAL EXAMINATION
GEN: no acute respiratory distress. nontoxic, speaking comfortably in full sentences  HEENT: NCAT. face symmetrical. PERRL 4mm, EOMI, MMM, oropharynx wnl.  Neck: no JVD, trachea midline, no LAD  CV: RRR. +S1S2, no murmur. 2+ pulses in 4 extremities, cap refill <2 sec  Chest: CTA B/l. no wheezing, rales, rhonchi. no retractions. good air movement. + right upper chest wall tenderness.   *******  ABD: +BS, soft, non distended, rlq tenderness. No guarding/rebound.   : no cva or suprapubic tenderness  MSK: No clubbing, cyanosis, edema. passive FROM of all extremities. not actively ranging bilateral lower ext. no tenderness to palpation. No midline tenderness, + paraspinal tenderness in bilateral lumbar region. no spinal step-offs.  Neuro: AAOX3. CN 2-12 intact; decreased sensation to sharp stimulation in bilateral lower extremity but able to differentiate left and right, motor 5/5 un bilateral upper ext, 2/5 in bilateral lower extremities.   ****  SKIN: No erythema, lesions or rash GEN: no acute respiratory distress. nontoxic, speaking comfortably in full sentences  HEENT: NCAT. face symmetrical. PERRL 4mm, EOMI, MMM, oropharynx wnl.  Neck: no JVD, trachea midline, no LAD  CV: RRR. +S1S2, no murmur. 2+ pulses in 4 extremities, cap refill <2 sec  Chest: CTA B/l. no wheezing, rales, rhonchi. no retractions. good air movement. + right upper chest wall tenderness.   ABD: +BS, soft, non distended, rlq tenderness. No guarding/rebound.   : no cva or suprapubic tenderness  MSK: No clubbing, cyanosis, edema. passive FROM of all extremities. not actively ranging bilateral lower ext. no tenderness to palpation. No midline tenderness, + paraspinal tenderness in bilateral lumbar region. no spinal step-offs.  Neuro: AAOX3. CN 2-12 intact; decreased sensation to sharp stimulation in bilateral lower extremity but able to differentiate left and right, motor 5/5 un bilateral upper ext, 2/5 in bilateral lower extremities.   SKIN: No erythema, lesions or rash

## 2022-12-26 NOTE — ED PROVIDER NOTE - PROGRESS NOTE DETAILS
Carlito Alvarez DO: patient reassessed. now 5/5 in lower extremities and sensation intact in bilateral lower extremities. reports lower back pain improved at this time.   chaperon ED tech Kendal: External genitalia normal.  No blood in vault.  Os is closed.  No CMT.  No adnexal tenderness or mass palpated.  Rectal exam no fissures, no gross blood, perianal sensation intact.  Normal rectal tone.

## 2022-12-26 NOTE — ED PROVIDER NOTE - CLINICAL SUMMARY MEDICAL DECISION MAKING FREE TEXT BOX
37-year-old female no significant past medical history presents for fever, chills, nausea, vomiting since yesterday afternoon.  This was preceded by several days of generalized weakness.  While in ED patient initially had lower extremity numbness and weakness which resolved completely after period of approximately 1 hour.  Does have associated right lower quadrant pain and nonfocal bilateral lower lumbar pain.  She has normal rectal tone, perianal sensation, lower extremity sensation and strength.  No history of surgery, trauma, IVDA, spinal injections or procedures.  Differential diagnosis includes, but not limited to, viral syndrome, appendicitis, urinary tract infection.  Patient has no focal spinal tenderness to suggest spinal abscess, discitis at this time.  Plan: Symptom relief, IV fluids, labs, CT, reassess.

## 2022-12-26 NOTE — ED PROVIDER NOTE - OBJECTIVE STATEMENT
37-year-old female past medical history , tubal ligation presents for nausea vomiting diarrhea and fever since yesterday.  Patient felt generalized weakness for the past several days went to sleep 11 AM 1 day ago woke up at 3 PM with nausea vomiting and diarrhea.  Vomiting and diarrhea without blood.  Reports at least 6 episodes of vomiting and 4 episodes of diarrhea since onset.  No known sick contacts.  She also reports nonproductive cough and focal right upper chest pain with cough.  Also reports right lower quadrant pain and bilateral lower lumbar back pain.  Patient initially able to ambulate upon arrival in ED, able to go to the bathroom and urinate on her own.  Waiting for evaluation reports now approximately 30 minutes of lower extremity numbness and weakness.  No history of prior symptoms.  No recent illness other than current 1.  Family history notable for CVA and diabetes.  No history of GBS or MS per patient or her family.  Patient denies alcohol, tobacco, drug use.  Denies any recent trauma.  She denies headache, vision or hearing changes, neck pain, shortness of breath.  Denies bladder or bowel incontinence.  Denies rash.

## 2022-12-26 NOTE — ED PROVIDER NOTE - NS ED ROS FT
Constitutional: + fever. + chills.   Eyes: No visual changes, eye pain or redness  HEENT: No throat pain, hearing changes, ear pain, nasal pain. No nose bleeding.  CV: + chest pain, no palpitations  Resp: No shortness of breath,  +cough  GI: + abdominal pain. + nausea.  +vomiting. + diarrhea. No constipation.   : No dysuria, hematuria. no urinary frequency, no urinary urgency.  MSK: + musculoskeletal pain  Skin: No rash, lesions, no bruises  Neuro: No headache. No numbness or tingling. No weakness. No dizziness.  Allergy/Immunology: no allergy to medicine

## 2022-12-27 LAB
CULTURE RESULTS: SIGNIFICANT CHANGE UP
SPECIMEN SOURCE: SIGNIFICANT CHANGE UP

## 2022-12-31 LAB
CULTURE RESULTS: SIGNIFICANT CHANGE UP
CULTURE RESULTS: SIGNIFICANT CHANGE UP
SPECIMEN SOURCE: SIGNIFICANT CHANGE UP
SPECIMEN SOURCE: SIGNIFICANT CHANGE UP

## 2023-03-24 ENCOUNTER — EMERGENCY (EMERGENCY)
Facility: HOSPITAL | Age: 38
LOS: 1 days | Discharge: HOME | End: 2023-03-24
Attending: STUDENT IN AN ORGANIZED HEALTH CARE EDUCATION/TRAINING PROGRAM | Admitting: STUDENT IN AN ORGANIZED HEALTH CARE EDUCATION/TRAINING PROGRAM
Payer: COMMERCIAL

## 2023-03-24 VITALS
SYSTOLIC BLOOD PRESSURE: 119 MMHG | OXYGEN SATURATION: 100 % | RESPIRATION RATE: 16 BRPM | HEART RATE: 62 BPM | DIASTOLIC BLOOD PRESSURE: 73 MMHG | TEMPERATURE: 99 F

## 2023-03-24 VITALS
RESPIRATION RATE: 18 BRPM | HEART RATE: 60 BPM | SYSTOLIC BLOOD PRESSURE: 124 MMHG | OXYGEN SATURATION: 100 % | TEMPERATURE: 98 F | DIASTOLIC BLOOD PRESSURE: 87 MMHG

## 2023-03-24 DIAGNOSIS — Z98.890 OTHER SPECIFIED POSTPROCEDURAL STATES: Chronic | ICD-10-CM

## 2023-03-24 DIAGNOSIS — Z33.2 ENCOUNTER FOR ELECTIVE TERMINATION OF PREGNANCY: Chronic | ICD-10-CM

## 2023-03-24 DIAGNOSIS — Z98.51 TUBAL LIGATION STATUS: Chronic | ICD-10-CM

## 2023-03-24 LAB
ALBUMIN SERPL ELPH-MCNC: 4.7 G/DL — SIGNIFICANT CHANGE UP (ref 3.3–5)
ALP SERPL-CCNC: 58 U/L — SIGNIFICANT CHANGE UP (ref 40–120)
ALT FLD-CCNC: 25 U/L — SIGNIFICANT CHANGE UP (ref 4–33)
ANION GAP SERPL CALC-SCNC: 10 MMOL/L — SIGNIFICANT CHANGE UP (ref 7–14)
AST SERPL-CCNC: 23 U/L — SIGNIFICANT CHANGE UP (ref 4–32)
BASOPHILS # BLD AUTO: 0.03 K/UL — SIGNIFICANT CHANGE UP (ref 0–0.2)
BASOPHILS NFR BLD AUTO: 0.7 % — SIGNIFICANT CHANGE UP (ref 0–2)
BILIRUB SERPL-MCNC: 0.5 MG/DL — SIGNIFICANT CHANGE UP (ref 0.2–1.2)
BUN SERPL-MCNC: 14 MG/DL — SIGNIFICANT CHANGE UP (ref 7–23)
CALCIUM SERPL-MCNC: 9.1 MG/DL — SIGNIFICANT CHANGE UP (ref 8.4–10.5)
CHLORIDE SERPL-SCNC: 104 MMOL/L — SIGNIFICANT CHANGE UP (ref 98–107)
CO2 SERPL-SCNC: 24 MMOL/L — SIGNIFICANT CHANGE UP (ref 22–31)
CREAT SERPL-MCNC: 0.79 MG/DL — SIGNIFICANT CHANGE UP (ref 0.5–1.3)
D DIMER BLD IA.RAPID-MCNC: <150 NG/ML DDU — SIGNIFICANT CHANGE UP
EGFR: 98 ML/MIN/1.73M2 — SIGNIFICANT CHANGE UP
EOSINOPHIL # BLD AUTO: 0.14 K/UL — SIGNIFICANT CHANGE UP (ref 0–0.5)
EOSINOPHIL NFR BLD AUTO: 3.3 % — SIGNIFICANT CHANGE UP (ref 0–6)
FLUAV AG NPH QL: SIGNIFICANT CHANGE UP
FLUBV AG NPH QL: SIGNIFICANT CHANGE UP
GLUCOSE SERPL-MCNC: 109 MG/DL — HIGH (ref 70–99)
HCG SERPL-ACNC: <5 MIU/ML — SIGNIFICANT CHANGE UP
HCT VFR BLD CALC: 40.9 % — SIGNIFICANT CHANGE UP (ref 34.5–45)
HGB BLD-MCNC: 13.1 G/DL — SIGNIFICANT CHANGE UP (ref 11.5–15.5)
IANC: 2.47 K/UL — SIGNIFICANT CHANGE UP (ref 1.8–7.4)
IMM GRANULOCYTES NFR BLD AUTO: 0.2 % — SIGNIFICANT CHANGE UP (ref 0–0.9)
LYMPHOCYTES # BLD AUTO: 1.25 K/UL — SIGNIFICANT CHANGE UP (ref 1–3.3)
LYMPHOCYTES # BLD AUTO: 29.1 % — SIGNIFICANT CHANGE UP (ref 13–44)
MAGNESIUM SERPL-MCNC: 1.9 MG/DL — SIGNIFICANT CHANGE UP (ref 1.6–2.6)
MCHC RBC-ENTMCNC: 27.6 PG — SIGNIFICANT CHANGE UP (ref 27–34)
MCHC RBC-ENTMCNC: 32 GM/DL — SIGNIFICANT CHANGE UP (ref 32–36)
MCV RBC AUTO: 86.1 FL — SIGNIFICANT CHANGE UP (ref 80–100)
MONOCYTES # BLD AUTO: 0.39 K/UL — SIGNIFICANT CHANGE UP (ref 0–0.9)
MONOCYTES NFR BLD AUTO: 9.1 % — SIGNIFICANT CHANGE UP (ref 2–14)
NEUTROPHILS # BLD AUTO: 2.47 K/UL — SIGNIFICANT CHANGE UP (ref 1.8–7.4)
NEUTROPHILS NFR BLD AUTO: 57.6 % — SIGNIFICANT CHANGE UP (ref 43–77)
NRBC # BLD: 0 /100 WBCS — SIGNIFICANT CHANGE UP (ref 0–0)
NRBC # FLD: 0 K/UL — SIGNIFICANT CHANGE UP (ref 0–0)
NT-PROBNP SERPL-SCNC: 25 PG/ML — SIGNIFICANT CHANGE UP
PLATELET # BLD AUTO: 304 K/UL — SIGNIFICANT CHANGE UP (ref 150–400)
POTASSIUM SERPL-MCNC: 4.3 MMOL/L — SIGNIFICANT CHANGE UP (ref 3.5–5.3)
POTASSIUM SERPL-SCNC: 4.3 MMOL/L — SIGNIFICANT CHANGE UP (ref 3.5–5.3)
PROT SERPL-MCNC: 7.3 G/DL — SIGNIFICANT CHANGE UP (ref 6–8.3)
RBC # BLD: 4.75 M/UL — SIGNIFICANT CHANGE UP (ref 3.8–5.2)
RBC # FLD: 13.3 % — SIGNIFICANT CHANGE UP (ref 10.3–14.5)
RSV RNA NPH QL NAA+NON-PROBE: SIGNIFICANT CHANGE UP
SARS-COV-2 RNA SPEC QL NAA+PROBE: SIGNIFICANT CHANGE UP
SODIUM SERPL-SCNC: 138 MMOL/L — SIGNIFICANT CHANGE UP (ref 135–145)
TROPONIN T, HIGH SENSITIVITY RESULT: <6 NG/L — SIGNIFICANT CHANGE UP
WBC # BLD: 4.29 K/UL — SIGNIFICANT CHANGE UP (ref 3.8–10.5)
WBC # FLD AUTO: 4.29 K/UL — SIGNIFICANT CHANGE UP (ref 3.8–10.5)

## 2023-03-24 PROCEDURE — 99285 EMERGENCY DEPT VISIT HI MDM: CPT

## 2023-03-24 PROCEDURE — 71046 X-RAY EXAM CHEST 2 VIEWS: CPT | Mod: 26

## 2023-03-24 NOTE — ED ADULT NURSE REASSESSMENT NOTE - NS ED NURSE REASSESS COMMENT FT1
Pt resting in bed A&Ox4, ambulatory complaining of SOB, chest pain when breathing and on inspiration and expiration. Pt denies abdominal pain, N/V/D at this time. Pt RR equal and unlabored, no signs of respiratory distress, 100% on room air. VS stable. Comfort measures provided. Care plan continued as ordered. Safety maintained. Awaiting lab results and imaging.

## 2023-03-24 NOTE — ED PROVIDER NOTE - ATTENDING CONTRIBUTION TO CARE
37-year-old female otherwise healthy presents with 2 weeks of shortness of breath at night.  Patient states every time she falls asleep she wakes up coughing and gasping for air.  For the last 2 days she has also developed cough and sore throat as well as shortness of breath and chest pain.  Patient currently having chest pain and shortness of breath despite being awake.  Patient says the chest pain is mostly when she is coughing she also feels chest pain when she is taking deep breath in.  No exertional symptoms.  No leg swelling no history of DVT or PE does not take any became medications, does not smoke. patient is well-appearing vitals within normal limits lungs are clear no lower extremity edema.  Symptoms are likely from a viral syndrome we will check labs troponin dimer and chest x-ray to rule out pneumonia PE or ACS though low suspicion for any of the above.   recs for pulm fu for sleep study for imani given waking up gasping for air.

## 2023-03-24 NOTE — ED ADULT NURSE NOTE - OBJECTIVE STATEMENT
Pt is A&O x 4, ambulatory w/o assistance, shows no signs of acute distress. Brought into the ED w/ increasing SOB for a few days. Pt prompted to come to the ED when the SOB awakened her out her sleep this AM. Pt denies fever/chills, palpitations or chest pain. No neuro deficits noted. Mild BARTLETT noted. VSS. Left AC 20 gauge placed and labs drawn. Pending CXR.

## 2023-03-24 NOTE — ED PROVIDER NOTE - PROGRESS NOTE DETAILS
Carlito Lemon, DO PGY-2: Labs nonactionable, D-dimer negative, troponin negative, chest x-ray clear.  Will discharge patient.

## 2023-03-24 NOTE — ED PROVIDER NOTE - NS ED ROS FT
GENERAL: No fever, chills  EYES: no vision changes, no discharge.   ENT: no difficulty swallowing or speaking   CARDIAC: + chest pain/pressure, SOB, no lower extremity swelling  PULMONARY: +cough, SOB  GI: no abdominal pain, n/v/d  : no dysuria, no hematuria  SKIN: no rashes, no ecchymosis  NEURO: no headache, lightheadedness  MSK: No joint pain, myalgia, weakness.

## 2023-03-24 NOTE — ED PROVIDER NOTE - NSFOLLOWUPINSTRUCTIONS_ED_ALL_ED_FT
Patient is in the supine position.   The body was positioned using the following devices: safety strap and blanket.  The left arm was positioned using the following devices: arm board and gel arm pads.  The right arm was positioned using the following devices: arm board and gel arm pads.  The patient was positioned by Kimberly García, RTR, Yanet Mendoza RN, Kaylene Choi  1. Please follow up with your primary care doctor within the next week.     Viral Respiratory Infection    A viral respiratory infection is an illness that affects parts of the body used for breathing, like the lungs, nose, and throat. It is caused by a germ called a virus. Symptoms can include runny nose, coughing, sneezing, fatigue, body aches, sore throat, fever, or headache. Over the counter medicine can be used to manage the symptoms but the infection typically goes away on its own in 5 to 10 days.     SEEK IMMEDIATE MEDICAL CARE IF YOU HAVE ANY OF THE FOLLOWING SYMPTOMS: shortness of breath, chest pain, fever over 10 days, or lightheadedness/dizziness.

## 2023-03-24 NOTE — ED ADULT NURSE NOTE - BREATH SOUNDS, MLM
normal... Well appearing, well nourished, awake, alert, oriented to person, place, time/situation and in no apparent distress. Clear

## 2023-03-24 NOTE — ED ADULT TRIAGE NOTE - TEMPERATURE IN CELSIUS (DEGREES C)
[FreeTextEntry1] : 77-year-old male s/p femoral hernia repair.\par \par -No further clinic needs at this time\par -Patient instructed on returning to clinic vs ER if symptoms arise/worsen 36.6

## 2023-03-24 NOTE — ED PROVIDER NOTE - CLINICAL SUMMARY MEDICAL DECISION MAKING FREE TEXT BOX
Carlito Lemon,  PGY-2: 37-year-old female no past medical history presents ED complaining of 2 weeks of cough and shortness of breath on waking up as well as 2 days of sore throat, shortness of breath, chest pain worsened with coughing and taking deep breath is nonexertional, congestion.  Denies fever, nausea, vomiting, tobacco use abdominal pain, diarrhea, dysuria. Patient well-appearing, vital signs stable, nonfocal physical exam.  Malawian includes not limited to viral syndrome, low suspicion for ACS, PE, pneumonia.  Plan for labs, troponin, D-dimer, chest x-ray, EKG.  Reassess

## 2023-03-24 NOTE — ED PROVIDER NOTE - PATIENT PORTAL LINK FT
You can access the FollowMyHealth Patient Portal offered by [facility] by registering at the following website: [email or web address]. By joining [email or web address]’s [email or web address] portal, you will also be able to view your health information using other applications (apps) compatible with our system.

## 2023-03-24 NOTE — ED ADULT NURSE NOTE - NSIMPLEMENTINTERV_GEN_ALL_ED
Implemented All Universal Safety Interventions:  [place] to call system. Call bell, personal items and telephone within reach. Instruct patient to call for assistance. Room bathroom lighting operational. Non-slip footwear when patient is off stretcher. Physically safe environment: no spills, clutter or unnecessary equipment. Stretcher in lowest position, wheels locked, appropriate side rails in place.

## 2023-03-24 NOTE — ED PROVIDER NOTE - OBJECTIVE STATEMENT
37-year-old female no past medical history presents ED complaining of 2 weeks of cough and shortness of breath on waking up as well as 2 days of sore throat, shortness of breath, chest pain worsened with coughing and taking deep breath is nonexertional, congestion.  Denies fever, nausea, vomiting, tobacco use abdominal pain, diarrhea, dysuria.

## 2023-03-24 NOTE — ED PROVIDER NOTE - NSICDXPASTSURGICALHX_GEN_ALL_CORE_FT
PAST SURGICAL HISTORY:   delivery delivered 10/25/2007 F 10lbs and 2011 M 9lbs    H/O tubal ligation     H/O umbilical hernia repair     Termination of pregnancy (fetus) D&C

## 2023-04-16 ENCOUNTER — NON-APPOINTMENT (OUTPATIENT)
Age: 38
End: 2023-04-16

## 2023-05-14 NOTE — OB PROVIDER TRIAGE NOTE - NS_OBGYNHISTORY_OBGYN_ALL_OB_FT
[facility] [phone]    [facility] VIDEO VISIT PROGRESS NOTE    CHIEF COMPLAINT  Chief Complaint   Patient presents with   • Video Visit       SUBJECTIVE  HISTORY OF PRESENT ILLNESS:   Gianna Pantoja is a 53 year old female who consents to a two-way Video Visit (V-Visit) for evaluation of vaginal symptoms.     Gianna reports vaginal vulvar itching. Symptom onset: 10 days ago. Denies concern for sexually transmitted infections. Denies possibility of pregnancy and denies breastfeeding. Denies recent antibiotic use. Patient feels it may be related to her new hot tub.  For symptom relief, the patient has tried OTC yeast cream, which has been ineffective. Patient was last treated for a yeast infection 11/2022 which started after antibiotics. The Diflucan worked well at that time.    Denies fevers or chills, nausea or vomiting, abdominal pain, flank pain, genital lesions and urinary symptoms.     REVIEW OF SYSTEMS   A review of systems was performed and findings relevant to this complaint are included in the HPI.    HISTORIES  Current medications, allergies, past medical history, past surgical history and social history have been reviewed and updated in the electronic medical record.    ALLERGIES  ALLERGIES:   Allergen Reactions   • Rosuvastatin MYALGIA        MEDICATIONS  Current Outpatient Medications   Medication Sig   • fluconazole (Diflucan) 150 MG tablet Take 1 tablet by mouth 1 time for 1 dose.   • gabapentin (NEURONTIN) 100 MG capsule TAKE 1 CAPSULE BY MOUTH AT BEDTIME   • meclizine (ANTIVERT) 25 MG tablet Take 1 tablet by mouth 3 times daily as needed for Dizziness.   • acetaminophen (TYLENOL) 325 MG tablet Take 2 tablets by mouth every 4 hours as needed for Pain or Fever.   • ibuprofen (MOTRIN) 200 MG tablet Take 2 tablets by mouth every 6 hours as needed for Pain or Fever.   • amLODIPine (NORVASC) 5 MG tablet Take 1 tablet by mouth daily.   • atorvastatin (Lipitor) 10 MG tablet Take 1  tablet by mouth daily.   • pantoprazole (PROTONIX) 20 MG tablet TAKE 1 TABLET BY MOUTH DAILY   • Cholecalciferol (vitamin D) 50 mcg (2,000 units) capsule Take 1 capsule by mouth daily.   • levonorgestrel (Liletta, 52 MG,) 19.5 MCG/DAY intrauterine device 1 Device by Intrauterine route every 5 years. Remove in 2026   • ARIPiprazole (ABILIFY) 5 MG tablet TK 1 T PO QHS   • buPROPion (WELLBUTRIN SR) 150 MG 12 hr tablet TK 2 TS PO QAM   • fluoxetine (PROzac) 40 MG capsule TK 2 CS PO QHS   • loratadine (Claritin) 10 MG tablet Take 1 tablet by mouth daily.     No current facility-administered medications for this visit.        OBJECTIVE  PHYSICAL EXAM:   Information acquired with patient assistance, demonstration, and feedback due to two-way video visit method of visit. Portions of assessment may be difficult to visualize precisely given nature of technology and limitations of assessment with virtual platform.   GENERAL: Awake, alert, oriented and in no acute distress.  HENT: Normocephalic, atraumatic.   LUNGS:  Effort is normal. Able to speak in full sentences. No evidence of respiratory distress.  PSYCHIATRIC: The patient was able to demonstrate good judgement and reason without abnormal affect or abnormal behaviors during the examination.     ASSESSMENT/PLAN   Vaginal candidiasis  - fluconazole (Diflucan) 150 MG tablet; Take 1 tablet by mouth 1 time for 1 dose.  Dispense: 1 tablet; Refill: 0  -recommend no soaks in hot tub for now  -if symptoms persist or change after treatment an in person check is recommended     The patient is in no acute distress and no evidence of red flag symptoms including abdominal pain, fever/chills, nausea/vomiting, genital lesions or lower urinary tract symptoms indicative of emergent urologic or gynecologic evaluation.     FOLLOW-UP   Return for As needed .  If the patient has unresolved symptoms, they have been advised to seek an in person evaluation with their primary care provider or at  an Urgent Care/Immediate Care.    If symptoms get worse, the patient should be seen immediately at an Urgent Care/Immediate Care or the Emergency Department.      PATIENT INSTRUCTIONS  Attached in After Visit Summary  The patient verbalizes understanding of the diagnosis and plan of care. There were no further questions or concerns.   They were advised to contact the [email or web address] RN with any questions at [phone].    CONSENT  Patient consent was obtained for this Video Visit using the [employer].   Clinician Location: [facility] Telehealth Visit- Home office.   Patient is at home, in the [facility] at the time of this visit.     STEVEN Adams  5/14/2023  11:30 AM         10/25/07 FT C/s NRFHT 10#  7/11/11 FT Scheduled repeat C/s 9#  TOP x 1    GYN: Denies

## 2023-05-17 ENCOUNTER — NON-APPOINTMENT (OUTPATIENT)
Age: 38
End: 2023-05-17

## 2023-10-25 NOTE — ED PROVIDER NOTE - PATIENT PORTAL LINK FT
Patient aware of script You can access the FollowMyHealth Patient Portal offered by [facility] by registering at the following website: [email or web address]. By joining [employer]’s FollowMyHealth portal, you will also be able to view your health information using other applications (apps) compatible with our system.

## 2023-11-02 ENCOUNTER — NON-APPOINTMENT (OUTPATIENT)
Age: 38
End: 2023-11-02

## 2023-12-12 ENCOUNTER — APPOINTMENT (OUTPATIENT)
Dept: ORTHOPEDIC SURGERY | Facility: CLINIC | Age: 38
End: 2023-12-12
Payer: COMMERCIAL

## 2023-12-12 VITALS — BODY MASS INDEX: 27.82 KG/M2 | WEIGHT: 167 LBS | HEIGHT: 65 IN

## 2023-12-12 DIAGNOSIS — M72.2 PLANTAR FASCIAL FIBROMATOSIS: ICD-10-CM

## 2023-12-12 DIAGNOSIS — M77.52 OTHER ENTHESOPATHY OF LT FOOT AND ANKLE: ICD-10-CM

## 2023-12-12 DIAGNOSIS — R73.03 PREDIABETES.: ICD-10-CM

## 2023-12-12 DIAGNOSIS — M62.89 OTHER SPECIFIED DISORDERS OF MUSCLE: ICD-10-CM

## 2023-12-12 PROCEDURE — 99203 OFFICE O/P NEW LOW 30 MIN: CPT

## 2023-12-12 PROCEDURE — 73630 X-RAY EXAM OF FOOT: CPT | Mod: RT

## 2023-12-26 NOTE — HISTORY OF PRESENT ILLNESS
[FreeTextEntry1] : 38 year old female presents today with bilateral feet pain x 2-3 months. No injury reported. The pain is constant worse with prolonged standing. Aleve is not helpful. 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 Denies numbness or tingling.

## 2023-12-26 NOTE — DISCUSSION/SUMMARY
[de-identified] : Discussed with patient nature of condition, potential course / sequelae, options reviewed.  [place] shoe wear / RBS, gel heel cups, activity modification, physical therapy / rehabilitation and associated modalities, calf stretching exercises and HEP, MRI assessment L foot.  Return to office in 3 - 4 weeks / PRN, advanced imaging study review.  All questions answered.

## 2023-12-26 NOTE — PHYSICAL EXAM
[de-identified] : Extremity: +equinus (releases) B LE, residual tenderness B forefoot (L > R) including central intermetatarsal region, tenderness plantar fascia origin B hindfoot.  Nontender B ankles, peroneals, syndesmosis, Achilles, hindfoot ST, midfoot LF and PTT insertional, and remainder of forefoot.  Calves soft and nontender, sensorimotor unchanged, skin intact B LE.  AOx3, mood / affect normal. 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

## 2023-12-30 ENCOUNTER — EMERGENCY (EMERGENCY)
Facility: HOSPITAL | Age: 38
LOS: 1 days | Discharge: HOME | End: 2023-12-30
Attending: EMERGENCY MEDICINE | Admitting: EMERGENCY MEDICINE
Payer: COMMERCIAL

## 2023-12-30 VITALS
DIASTOLIC BLOOD PRESSURE: 76 MMHG | TEMPERATURE: 100 F | SYSTOLIC BLOOD PRESSURE: 110 MMHG | OXYGEN SATURATION: 100 % | RESPIRATION RATE: 16 BRPM | HEART RATE: 88 BPM

## 2023-12-30 DIAGNOSIS — Z33.2 ENCOUNTER FOR ELECTIVE TERMINATION OF PREGNANCY: Chronic | ICD-10-CM

## 2023-12-30 DIAGNOSIS — Z98.51 TUBAL LIGATION STATUS: Chronic | ICD-10-CM

## 2023-12-30 DIAGNOSIS — Z98.890 OTHER SPECIFIED POSTPROCEDURAL STATES: Chronic | ICD-10-CM

## 2023-12-30 LAB
B PERT DNA SPEC QL NAA+PROBE: SIGNIFICANT CHANGE UP
B PERT DNA SPEC QL NAA+PROBE: SIGNIFICANT CHANGE UP
B PERT+PARAPERT DNA PNL SPEC NAA+PROBE: SIGNIFICANT CHANGE UP
B PERT+PARAPERT DNA PNL SPEC NAA+PROBE: SIGNIFICANT CHANGE UP
BORDETELLA PARAPERTUSSIS (RAPRVP): SIGNIFICANT CHANGE UP
BORDETELLA PARAPERTUSSIS (RAPRVP): SIGNIFICANT CHANGE UP
C PNEUM DNA SPEC QL NAA+PROBE: SIGNIFICANT CHANGE UP
C PNEUM DNA SPEC QL NAA+PROBE: SIGNIFICANT CHANGE UP
FLUAV SUBTYP SPEC NAA+PROBE: SIGNIFICANT CHANGE UP
FLUAV SUBTYP SPEC NAA+PROBE: SIGNIFICANT CHANGE UP
FLUBV RNA SPEC QL NAA+PROBE: DETECTED
FLUBV RNA SPEC QL NAA+PROBE: DETECTED
HADV DNA SPEC QL NAA+PROBE: SIGNIFICANT CHANGE UP
HADV DNA SPEC QL NAA+PROBE: SIGNIFICANT CHANGE UP
HCOV 229E RNA SPEC QL NAA+PROBE: SIGNIFICANT CHANGE UP
HCOV 229E RNA SPEC QL NAA+PROBE: SIGNIFICANT CHANGE UP
HCOV HKU1 RNA SPEC QL NAA+PROBE: SIGNIFICANT CHANGE UP
HCOV HKU1 RNA SPEC QL NAA+PROBE: SIGNIFICANT CHANGE UP
HCOV NL63 RNA SPEC QL NAA+PROBE: SIGNIFICANT CHANGE UP
HCOV NL63 RNA SPEC QL NAA+PROBE: SIGNIFICANT CHANGE UP
HCOV OC43 RNA SPEC QL NAA+PROBE: SIGNIFICANT CHANGE UP
HCOV OC43 RNA SPEC QL NAA+PROBE: SIGNIFICANT CHANGE UP
HMPV RNA SPEC QL NAA+PROBE: SIGNIFICANT CHANGE UP
HMPV RNA SPEC QL NAA+PROBE: SIGNIFICANT CHANGE UP
HPIV1 RNA SPEC QL NAA+PROBE: SIGNIFICANT CHANGE UP
HPIV1 RNA SPEC QL NAA+PROBE: SIGNIFICANT CHANGE UP
HPIV2 RNA SPEC QL NAA+PROBE: SIGNIFICANT CHANGE UP
HPIV2 RNA SPEC QL NAA+PROBE: SIGNIFICANT CHANGE UP
HPIV3 RNA SPEC QL NAA+PROBE: SIGNIFICANT CHANGE UP
HPIV3 RNA SPEC QL NAA+PROBE: SIGNIFICANT CHANGE UP
HPIV4 RNA SPEC QL NAA+PROBE: SIGNIFICANT CHANGE UP
HPIV4 RNA SPEC QL NAA+PROBE: SIGNIFICANT CHANGE UP
M PNEUMO DNA SPEC QL NAA+PROBE: SIGNIFICANT CHANGE UP
M PNEUMO DNA SPEC QL NAA+PROBE: SIGNIFICANT CHANGE UP
RAPID RVP RESULT: DETECTED
RAPID RVP RESULT: DETECTED
RSV RNA SPEC QL NAA+PROBE: SIGNIFICANT CHANGE UP
RSV RNA SPEC QL NAA+PROBE: SIGNIFICANT CHANGE UP
RV+EV RNA SPEC QL NAA+PROBE: SIGNIFICANT CHANGE UP
RV+EV RNA SPEC QL NAA+PROBE: SIGNIFICANT CHANGE UP
SARS-COV-2 RNA SPEC QL NAA+PROBE: SIGNIFICANT CHANGE UP
SARS-COV-2 RNA SPEC QL NAA+PROBE: SIGNIFICANT CHANGE UP

## 2023-12-30 PROCEDURE — 99284 EMERGENCY DEPT VISIT MOD MDM: CPT

## 2023-12-30 RX ORDER — KETOROLAC TROMETHAMINE 30 MG/ML
30 SYRINGE (ML) INJECTION ONCE
Refills: 0 | Status: DISCONTINUED | OUTPATIENT
Start: 2023-12-30 | End: 2023-12-30

## 2023-12-30 RX ORDER — SODIUM CHLORIDE 9 MG/ML
1000 INJECTION INTRAMUSCULAR; INTRAVENOUS; SUBCUTANEOUS ONCE
Refills: 0 | Status: COMPLETED | OUTPATIENT
Start: 2023-12-30 | End: 2023-12-30

## 2023-12-30 RX ORDER — ACETAMINOPHEN 500 MG
1000 TABLET ORAL ONCE
Refills: 0 | Status: COMPLETED | OUTPATIENT
Start: 2023-12-30 | End: 2023-12-30

## 2023-12-30 RX ADMIN — SODIUM CHLORIDE 1000 MILLILITER(S): 9 INJECTION INTRAMUSCULAR; INTRAVENOUS; SUBCUTANEOUS at 21:19

## 2023-12-30 RX ADMIN — Medication 400 MILLIGRAM(S): at 21:19

## 2023-12-30 RX ADMIN — Medication 30 MILLIGRAM(S): at 21:27

## 2023-12-30 NOTE — ED PROVIDER NOTE - NSFOLLOWUPINSTRUCTIONS_ED_ALL_ED_FT
You tested positive for the flu.  You may be sick for the next 7 to 10 days.  Recommend taking 2 extra strength Tylenol every 6 hours for fever and body pain, if no relief with Tylenol you can also take ibuprofen.  Please return to the emergency department if you have inability to hold down any food or liquids or medications, chest pain or difficulty breathing. Rest and stay hydrated.

## 2023-12-30 NOTE — ED PROVIDER NOTE - PATIENT PORTAL LINK FT
You can access the FollowMyHealth Patient Portal offered by [facility] by registering at the following website: [email or web address]. By joining [employer]’s FollowMyHealth portal, you will also be able to view your health information using other applications (apps) compatible with our system. You can access the FollowMyHealth Patient Portal offered by [facility] by registering at the following website: [email or web address]. By joining [employer]’s FollowMyHealth portal, you will also be able to view your health information using other applications (apps) compatible with our system.

## 2023-12-30 NOTE — ED ADULT NURSE NOTE - OBJECTIVE STATEMENT
Patient received in ED intake room 3. A&Ox4 and ambulatory. C/o body aches, headache, fever and chills x 3 days. Denies sick contact, CP, SOB, nausea, vomiting. Reports 10/10 pain level at this time. Respirations even and unlabored. No acute distress noted. Speaking in full and complete sentences. IV 20g placed to right AC, medicated as ordered, see MAR. Nasal swab obtained and sent as ordered. Bed in lowest position, call bell in reach, wheels locked, safety maintained. Awaiting further orders.

## 2023-12-30 NOTE — ED ADULT NURSE NOTE - NSFALLUNIVINTERV_ED_ALL_ED
Bed/Stretcher in lowest position, wheels locked, appropriate side rails in place/Call bell, personal items and telephone in reach/Instruct patient to call for assistance before getting out of bed/chair/stretcher/Non-slip footwear applied when patient is off stretcher/[place] to call system/Physically safe environment - no spills, clutter or unnecessary equipment/Purposeful proactive rounding/Room/bathroom lighting operational, light cord in reach Bed/Stretcher in lowest position, wheels locked, appropriate side rails in place/Call bell, personal items and telephone in reach/Instruct patient to call for assistance before getting out of bed/chair/stretcher/Non-slip footwear applied when patient is off stretcher/[place] to call system/Physically safe environment - no spills, clutter or unnecessary equipment/Purposeful proactive rounding/Room/bathroom lighting operational, light cord in reach

## 2023-12-30 NOTE — ED PROVIDER NOTE - PROGRESS NOTE DETAILS
Patient tolerating p.o., RVP is positive for influenza.  Patient informed of results.  Will discharge home with supportive care.

## 2023-12-30 NOTE — ED ADULT NURSE NOTE - CAS DISCH TRANSFER METHOD
[FreeTextEntry1] : worsening GERD symptoms for past month\par taking OTC nexium with some relief family /Private car

## 2023-12-30 NOTE — ED PROVIDER NOTE - CLINICAL SUMMARY MEDICAL DECISION MAKING FREE TEXT BOX
Otherwise healthy female presents emergency department with 3 days of runny nose, dry cough, body aches, fevers, chills.  Denies any nausea, vomiting, shortness of breath or chest pain.  Patient is not taking any medications prior to arrival.  On exam patient noted to have low-grade fever, normal cardiopulmonary exam, abdomen soft nontender.  Suspect constellation of symptoms likely related to viral illness.  Plan for Motrin, Tylenol, IV fluids as patient states she has not been eating much over the last 3 days because she is not hungry, RVP.

## 2023-12-30 NOTE — ED PROVIDER NOTE - PHYSICAL EXAMINATION
GEN - NAD; well appearing; A+O x3   HEAD - NC/AT   EYES- PERRL, EOMI  ENT: Airway patent, mmm  NECK: Neck supple  PULMONARY - CTA b/l, symmetric breath sounds.   CARDIAC -s1s2, RRR, no M,G,R  ABDOMEN - +BS, ND, NT, soft, no guarding, no rebound, no masses   EXTREMITIES - FROM  NEUROLOGIC - alert, speech clear  PSYCH -nl mood/affect, nl insight.

## 2023-12-31 VITALS
DIASTOLIC BLOOD PRESSURE: 74 MMHG | SYSTOLIC BLOOD PRESSURE: 106 MMHG | OXYGEN SATURATION: 99 % | RESPIRATION RATE: 16 BRPM | HEART RATE: 76 BPM | TEMPERATURE: 98 F

## 2024-01-16 ENCOUNTER — APPOINTMENT (OUTPATIENT)
Dept: ORTHOPEDIC SURGERY | Facility: CLINIC | Age: 39
End: 2024-01-16

## 2024-02-15 ENCOUNTER — INPATIENT (INPATIENT)
Facility: HOSPITAL | Age: 39
LOS: 1 days | Discharge: HOME | End: 2024-02-17
Attending: GENERAL ACUTE CARE HOSPITAL | Admitting: GENERAL ACUTE CARE HOSPITAL
Payer: COMMERCIAL

## 2024-02-15 VITALS
TEMPERATURE: 98 F | HEART RATE: 72 BPM | DIASTOLIC BLOOD PRESSURE: 78 MMHG | RESPIRATION RATE: 20 BRPM | OXYGEN SATURATION: 99 % | SYSTOLIC BLOOD PRESSURE: 113 MMHG | HEIGHT: 65 IN | WEIGHT: 166.89 LBS

## 2024-02-15 DIAGNOSIS — I63.9 CEREBRAL INFARCTION, UNSPECIFIED: ICD-10-CM

## 2024-02-15 DIAGNOSIS — Z98.51 TUBAL LIGATION STATUS: Chronic | ICD-10-CM

## 2024-02-15 DIAGNOSIS — Z33.2 ENCOUNTER FOR ELECTIVE TERMINATION OF PREGNANCY: Chronic | ICD-10-CM

## 2024-02-15 DIAGNOSIS — Z98.890 OTHER SPECIFIED POSTPROCEDURAL STATES: Chronic | ICD-10-CM

## 2024-02-15 LAB
ALBUMIN SERPL ELPH-MCNC: 3.8 G/DL — SIGNIFICANT CHANGE UP (ref 3.3–5)
ALP SERPL-CCNC: 63 U/L — SIGNIFICANT CHANGE UP (ref 40–120)
ALT FLD-CCNC: 30 U/L — SIGNIFICANT CHANGE UP (ref 12–78)
ANION GAP SERPL CALC-SCNC: 3 MMOL/L — LOW (ref 5–17)
APPEARANCE UR: CLEAR — SIGNIFICANT CHANGE UP
APTT BLD: 34.2 SEC — SIGNIFICANT CHANGE UP (ref 24.5–35.6)
AST SERPL-CCNC: 20 U/L — SIGNIFICANT CHANGE UP (ref 15–37)
BASOPHILS # BLD AUTO: 0.03 K/UL — SIGNIFICANT CHANGE UP (ref 0–0.2)
BASOPHILS NFR BLD AUTO: 0.5 % — SIGNIFICANT CHANGE UP (ref 0–2)
BILIRUB SERPL-MCNC: 0.3 MG/DL — SIGNIFICANT CHANGE UP (ref 0.2–1.2)
BILIRUB UR-MCNC: NEGATIVE — SIGNIFICANT CHANGE UP
BUN SERPL-MCNC: 12 MG/DL — SIGNIFICANT CHANGE UP (ref 7–23)
CALCIUM SERPL-MCNC: 9.2 MG/DL — SIGNIFICANT CHANGE UP (ref 8.5–10.1)
CHLORIDE SERPL-SCNC: 108 MMOL/L — SIGNIFICANT CHANGE UP (ref 96–108)
CHOLEST SERPL-MCNC: 136 MG/DL — SIGNIFICANT CHANGE UP
CK SERPL-CCNC: 147 U/L — SIGNIFICANT CHANGE UP (ref 26–192)
CO2 SERPL-SCNC: 28 MMOL/L — SIGNIFICANT CHANGE UP (ref 22–31)
COLOR SPEC: YELLOW — SIGNIFICANT CHANGE UP
CREAT SERPL-MCNC: 0.88 MG/DL — SIGNIFICANT CHANGE UP (ref 0.5–1.3)
DIFF PNL FLD: NEGATIVE — SIGNIFICANT CHANGE UP
EGFR: 85 ML/MIN/1.73M2 — SIGNIFICANT CHANGE UP
EOSINOPHIL # BLD AUTO: 0.16 K/UL — SIGNIFICANT CHANGE UP (ref 0–0.5)
EOSINOPHIL NFR BLD AUTO: 2.4 % — SIGNIFICANT CHANGE UP (ref 0–6)
ETHANOL SERPL-MCNC: <10 MG/DL — SIGNIFICANT CHANGE UP (ref 0–10)
GLUCOSE SERPL-MCNC: 121 MG/DL — HIGH (ref 70–99)
GLUCOSE UR QL: NEGATIVE MG/DL — SIGNIFICANT CHANGE UP
HCT VFR BLD CALC: 40.8 % — SIGNIFICANT CHANGE UP (ref 34.5–45)
HCYS SERPL-MCNC: 12.6 UMOL/L — SIGNIFICANT CHANGE UP
HDLC SERPL-MCNC: 42 MG/DL — LOW
HGB BLD-MCNC: 12.8 G/DL — SIGNIFICANT CHANGE UP (ref 11.5–15.5)
IMM GRANULOCYTES NFR BLD AUTO: 0.3 % — SIGNIFICANT CHANGE UP (ref 0–0.9)
INR BLD: 0.92 RATIO — SIGNIFICANT CHANGE UP (ref 0.85–1.18)
KETONES UR-MCNC: NEGATIVE MG/DL — SIGNIFICANT CHANGE UP
LACTATE SERPL-SCNC: 1.4 MMOL/L — SIGNIFICANT CHANGE UP (ref 0.7–2)
LEUKOCYTE ESTERASE UR-ACNC: NEGATIVE — SIGNIFICANT CHANGE UP
LIPID PNL WITH DIRECT LDL SERPL: 83 MG/DL — SIGNIFICANT CHANGE UP
LYMPHOCYTES # BLD AUTO: 2.27 K/UL — SIGNIFICANT CHANGE UP (ref 1–3.3)
LYMPHOCYTES # BLD AUTO: 34.5 % — SIGNIFICANT CHANGE UP (ref 13–44)
MCHC RBC-ENTMCNC: 25.4 PG — LOW (ref 27–34)
MCHC RBC-ENTMCNC: 31.4 G/DL — LOW (ref 32–36)
MCV RBC AUTO: 81.1 FL — SIGNIFICANT CHANGE UP (ref 80–100)
MONOCYTES # BLD AUTO: 0.64 K/UL — SIGNIFICANT CHANGE UP (ref 0–0.9)
MONOCYTES NFR BLD AUTO: 9.7 % — SIGNIFICANT CHANGE UP (ref 2–14)
NEUTROPHILS # BLD AUTO: 3.46 K/UL — SIGNIFICANT CHANGE UP (ref 1.8–7.4)
NEUTROPHILS NFR BLD AUTO: 52.6 % — SIGNIFICANT CHANGE UP (ref 43–77)
NITRITE UR-MCNC: NEGATIVE — SIGNIFICANT CHANGE UP
NON HDL CHOLESTEROL: 94 MG/DL — SIGNIFICANT CHANGE UP
NRBC # BLD: 0 /100 WBCS — SIGNIFICANT CHANGE UP (ref 0–0)
PH UR: 6 — SIGNIFICANT CHANGE UP (ref 5–8)
PLATELET # BLD AUTO: 337 K/UL — SIGNIFICANT CHANGE UP (ref 150–400)
POTASSIUM SERPL-MCNC: 4.2 MMOL/L — SIGNIFICANT CHANGE UP (ref 3.5–5.3)
POTASSIUM SERPL-SCNC: 4.2 MMOL/L — SIGNIFICANT CHANGE UP (ref 3.5–5.3)
PROT SERPL-MCNC: 8 GM/DL — SIGNIFICANT CHANGE UP (ref 6–8.3)
PROT UR-MCNC: NEGATIVE MG/DL — SIGNIFICANT CHANGE UP
PROTHROM AB SERPL-ACNC: 11.1 SEC — SIGNIFICANT CHANGE UP (ref 9.5–13)
RBC # BLD: 5.03 M/UL — SIGNIFICANT CHANGE UP (ref 3.8–5.2)
RBC # FLD: 15 % — HIGH (ref 10.3–14.5)
SODIUM SERPL-SCNC: 139 MMOL/L — SIGNIFICANT CHANGE UP (ref 135–145)
SP GR SPEC: >1.03 — HIGH (ref 1–1.03)
TRIGL SERPL-MCNC: 48 MG/DL — SIGNIFICANT CHANGE UP
TROPONIN I, HIGH SENSITIVITY RESULT: 4 NG/L — SIGNIFICANT CHANGE UP
UROBILINOGEN FLD QL: 0.2 MG/DL — SIGNIFICANT CHANGE UP (ref 0.2–1)
WBC # BLD: 6.58 K/UL — SIGNIFICANT CHANGE UP (ref 3.8–10.5)
WBC # FLD AUTO: 6.58 K/UL — SIGNIFICANT CHANGE UP (ref 3.8–10.5)

## 2024-02-15 PROCEDURE — 99285 EMERGENCY DEPT VISIT HI MDM: CPT

## 2024-02-15 PROCEDURE — 99223 1ST HOSP IP/OBS HIGH 75: CPT

## 2024-02-15 PROCEDURE — 73030 X-RAY EXAM OF SHOULDER: CPT | Mod: 26,LT

## 2024-02-15 PROCEDURE — 70496 CT ANGIOGRAPHY HEAD: CPT | Mod: 26,MA

## 2024-02-15 PROCEDURE — 83020 HEMOGLOBIN ELECTROPHORESIS: CPT | Mod: 26

## 2024-02-15 PROCEDURE — 70498 CT ANGIOGRAPHY NECK: CPT | Mod: 26,MA

## 2024-02-15 PROCEDURE — 93010 ELECTROCARDIOGRAM REPORT: CPT

## 2024-02-15 PROCEDURE — 0042T: CPT | Mod: MA

## 2024-02-15 PROCEDURE — 73502 X-RAY EXAM HIP UNI 2-3 VIEWS: CPT | Mod: 26,LT

## 2024-02-15 PROCEDURE — G0452: CPT | Mod: 26

## 2024-02-15 PROCEDURE — 72125 CT NECK SPINE W/O DYE: CPT | Mod: 26

## 2024-02-15 PROCEDURE — 70450 CT HEAD/BRAIN W/O DYE: CPT | Mod: 26,MA,59

## 2024-02-15 RX ORDER — ASPIRIN/CALCIUM CARB/MAGNESIUM 324 MG
81 TABLET ORAL DAILY
Refills: 0 | Status: DISCONTINUED | OUTPATIENT
Start: 2024-02-16 | End: 2024-02-17

## 2024-02-15 RX ORDER — ENOXAPARIN SODIUM 100 MG/ML
40 INJECTION SUBCUTANEOUS EVERY 24 HOURS
Refills: 0 | Status: DISCONTINUED | OUTPATIENT
Start: 2024-02-15 | End: 2024-02-17

## 2024-02-15 RX ORDER — ASPIRIN/CALCIUM CARB/MAGNESIUM 324 MG
325 TABLET ORAL ONCE
Refills: 0 | Status: COMPLETED | OUTPATIENT
Start: 2024-02-15 | End: 2024-02-15

## 2024-02-15 RX ORDER — CLOPIDOGREL BISULFATE 75 MG/1
75 TABLET, FILM COATED ORAL ONCE
Refills: 0 | Status: COMPLETED | OUTPATIENT
Start: 2024-02-15 | End: 2024-02-15

## 2024-02-15 RX ORDER — DIAZEPAM 5 MG
5 TABLET ORAL ONCE
Refills: 0 | Status: DISCONTINUED | OUTPATIENT
Start: 2024-02-15 | End: 2024-02-15

## 2024-02-15 RX ORDER — SODIUM CHLORIDE 9 MG/ML
1000 INJECTION INTRAMUSCULAR; INTRAVENOUS; SUBCUTANEOUS ONCE
Refills: 0 | Status: COMPLETED | OUTPATIENT
Start: 2024-02-15 | End: 2024-02-15

## 2024-02-15 RX ORDER — ACETAMINOPHEN 500 MG
1000 TABLET ORAL ONCE
Refills: 0 | Status: COMPLETED | OUTPATIENT
Start: 2024-02-15 | End: 2024-02-15

## 2024-02-15 RX ORDER — ATORVASTATIN CALCIUM 80 MG/1
40 TABLET, FILM COATED ORAL AT BEDTIME
Refills: 0 | Status: DISCONTINUED | OUTPATIENT
Start: 2024-02-15 | End: 2024-02-17

## 2024-02-15 RX ADMIN — ATORVASTATIN CALCIUM 40 MILLIGRAM(S): 80 TABLET, FILM COATED ORAL at 21:37

## 2024-02-15 RX ADMIN — Medication 1000 MILLIGRAM(S): at 09:07

## 2024-02-15 RX ADMIN — Medication 325 MILLIGRAM(S): at 08:28

## 2024-02-15 RX ADMIN — Medication 400 MILLIGRAM(S): at 08:16

## 2024-02-15 RX ADMIN — CLOPIDOGREL BISULFATE 75 MILLIGRAM(S): 75 TABLET, FILM COATED ORAL at 08:29

## 2024-02-15 RX ADMIN — SODIUM CHLORIDE 1000 MILLILITER(S): 9 INJECTION INTRAMUSCULAR; INTRAVENOUS; SUBCUTANEOUS at 08:15

## 2024-02-15 RX ADMIN — ENOXAPARIN SODIUM 40 MILLIGRAM(S): 100 INJECTION SUBCUTANEOUS at 13:07

## 2024-02-15 NOTE — OCCUPATIONAL THERAPY INITIAL EVALUATION ADULT - GENERAL OBSERVATIONS, REHAB EVAL
Pt was encountered supine in bed stretcher; NAD, portable telemetry +, BP cuff +, PIV +, cardiac monitor +, continuous pulse ox +, primafit +, lethargic (arousable with vebral cues), oriented x4, followed commands, cooperative; pt c/o pain in LUE/LLE which impacts pts ability to perform functional tasks/transfers and mobility.

## 2024-02-15 NOTE — H&P ADULT - NSHPPHYSICALEXAM_GEN_ALL_CORE
CONSTITUTIONAL: alert and cooperative, no acute distress.  EYES: PERRL,  no scleral icterus  ENT: Mucosa moist, tongue normal  NECK: Neck supple, trachea midline, non-tender  CARDIAC: Normal S1 and S2. Regular rate and rhythms. No Pedal edema. Peripheral pulses intact  LUNGS: Equal air entry both lungs. No rales, rhonchi, wheezing. Normal respiratory effort.   ABDOMEN: Soft, nondistended, nontender. No guarding or rebound tenderness. No hepatomegaly or splenomegaly. Bowel sound normal.  MUSCULOSKELETAL: Normocephalic, atraumatic. No significant deformity or joint abnormality.   NEUROLOGICAL: Left side paralysis. Loss of light touch to left half of body from face down to LLE.  Able to feel noxious stimulus but does not withdrawal to pain. No facial droop. No slurred speech. Visual field intact.   SKIN: no lesions or eruptions. Normal turgor  PSYCHIATRIC: A&O x 3, appropriate mood and affect

## 2024-02-15 NOTE — H&P ADULT - ASSESSMENT
38 years old female with no significant medical history present to ED with complain of left sided weakness. Patient reported that she noted limbing while walking at around yesterday 9PM ( 2/14/24) when she went to  her child. Patient went to bed and woke up at 3:5AM and unable to move left side at all. No recent trauma. Patient reported that she started to have migraine headache lately and was referred to see a neurologist but has not done so yet.   Hemodynamically stable, afebrile, sat well at RA. EKG with NSR. No leukocytosis, plt 337, K 4.2, lactate 1.4. Serum alcohol negative. CT head with no acute intracranial pathology. CTA with no hemodynamic significant stenosis. 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 CT C spine with no acute fracture

## 2024-02-15 NOTE — OCCUPATIONAL THERAPY INITIAL EVALUATION ADULT - IMPAIRMENTS CONTRIBUTING IMPAIRED BED MOBILITY, REHAB EVAL
ataxic/impaired balance/impaired coordination/impaired motor control/abnormal muscle tone/impaired postural control/decreased ROM/decreased sensation/impaired sensory feedback/decreased strength

## 2024-02-15 NOTE — ED ADULT NURSE REASSESSMENT NOTE - NS ED NURSE REASSESS COMMENT FT1
spoke with Keke CURTIS   blood work pending due to unsigned consent form . endorsed to Rochelle kamara. patient currently at CT

## 2024-02-15 NOTE — OCCUPATIONAL THERAPY INITIAL EVALUATION ADULT - RUE MMT, REHAB EVAL
Ambulated 100 ft gait steady. Voided, back to stretcher right groin dsg D&I, area soft. Assisted with dressing 1600 discharged via w/c with wife, instructions, cardiomems suitcase and monitor, and belongings
Cardiac Cath Lab Recovery Arrival Note:      Theresa Domingo arrived to Cardiac Cath Lab, Recovery Area. Staff introduced to patient. Patient identifiers verified with NAME and DATE OF BIRTH. Procedure verified with patient. Consent forms reviewed and signed by patient or authorized representative and verified. Allergies verified. Patient and family oriented to department. Patient and family informed of procedure and plan of care. Questions answered with review. Patient prepped for procedure, per orders from physician, prior to arrival.    Patient on cardiac monitor, non-invasive blood pressure, SPO2 monitor. On room air. Patient is A&Ox 4. Patient reports no complaints. Patient in stretcher, in low position, with side rails up, call bell within reach, patient instructed to call if assistance as needed. Patient prep in: [address], [place].    Patient family has pager # [phone]  Family in: wife in hospital.   Prep by: Eloisa Padilla RN    Pre cardiomems teaching started,     Pt has Milrinone gtt, 40 gm/200 ml  @ 5.4 ml/hr  Via right upper arm double luman PICC
Discharge instruction reviewed with pt and wife from cardiomems rep. 1330 discharge instructions reviewed with pt and wife for care of cath site. . Teach back method used. 1400 tolerated food and drink
SHEATH PULL NOTE: 
 
Patient informed of procedure with questions answered with review. Sheath site prepped with Chloraprep swab. 11 fr sheath in right groin pulled by peng Woodard. Hand hold and quick clot , with manual compression to site. No bleeding, no hematoma, no pain at site. Hemostasis obtained with hand hold/manual compression at site. Patient tolerated well. No change in status. Handhold for 15 minutes. No change at site. Gauze and tegaderm  dressing applied to site. No bleeding, no hematoma, no pain/discomfort at site. Groin instructions provided with review. Continue to monitor procedure site and patient status. *Advised patient to keep head flat and extremity flat to decrease risk of bleeding. *Recommended that patient not drink for ONE HOUR post sheath pull completion. *Recommended that patient not eat for TWO HOURS post sheath pull completion. *Instructed patient on rationale for delay of PO products to decrease risk for aspiration and if additional treatment to procedure site is required. Patient verbalized understanding of instructions with review.
TRANSFER - IN REPORT: 
 
Verbal report received from [address] [place] [address] on [facility]  being received from procedure for routine progression of care. Report consisted of patients Situation, Background, Assessment and Recommendations(SBAR). Information from the following report(s) Procedure Summary, MAR, Recent Results and Med Rec Status was reviewed with the receiving clinician. Opportunity for questions and clarification was provided. Assessment completed upon patients arrival to [address] [place] [address] and care assumed. Cardiac Cath Lab Recovery Arrival Note: 
 
[employer] arrived to [facility] recovery area. Patient procedure= Cardiomems placed. Patient on cardiac monitor, non-invasive blood pressure, SPO2 monitor. On room air. IV  of nacl on pump at 25 ml/hr. Patient status doing well without problems. Patient is A&Ox 4. Patient reports no complaints. PROCEDURE SITE CHECK: 
 
Procedure site:without any bleeding and or hematoma, no pain/discomfort reported at procedure site. No change in patient status. Continue to monitor patient and status. Dr Sony Allen talked with pt and wife.  
0 Dr Light Hasting in to talk with pt  
 
 
 
 
 

TRANSFER - OUT REPORT: 
 
Verbal report given to Dayday Valentinstaci (name) on Eulalia Mayorga  being transferred to cath lab recovery room for Mariella Hernandez RN (unit) for routine progression of care Report consisted of patients Situation, Background, Assessment and  
Recommendations(SBAR). Information from the following report(s) SBAR, Procedure Summary and MAR was reviewed with the receiving nurse. Lines: PICC Double Lumen 26/78/65 Right;Basilic (Active) Peripheral IV 09/18/19 Left Forearm (Active) Opportunity for questions and clarification was provided. Patient transported with: 
 [employer]
Grossly./no strength deficits were identified

## 2024-02-15 NOTE — OCCUPATIONAL THERAPY INITIAL EVALUATION ADULT - STRENGTHENING, PT EVAL
Pt will increase left lower extremity strength to 5/5 to improve functional strength needed to engage in functional tasks by 6 weeks

## 2024-02-15 NOTE — CONSULT NOTE ADULT - ASSESSMENT
Acute R hip pain.     Acute pain IP joints of L hand.    Chronically recurring pain L shoulder.      LUE and LLE motor functional impairment.    Waxing/waning sensory disturbance LUE and LLE in the setting of polyarthralgia.          Acute R hip pain.     Acute pain IP joints of L hand.    Chronically recurring pain L shoulder.      LUE and LLE motor functional impairment.    Waxing/waning sensory disturbance LUE and LLE in the setting of polyarthralgia.      Stroke unlikely.    R/O septic joint.         RECOMMENDATIONS    ESR, CRP, RF, CCP, SATINDER, SPEP, B12, folate, Hgb A1c     CT pelvis/hips.    X-rays L hand.      Non-con MR brain.      Yaya Fox M.D.   - Department of Neurology  [place] [facility] [employer] [facility]                Acute L hip pain.     Acute pain IP joints of L hand.    Chronically recurring pain L shoulder.      LUE and LLE motor functional impairment.    Waxing/waning sensory disturbance LUE and LLE in the setting of polyarthralgia.    One of the adaptive responses to peripheral pain is the centrally-mediated suppression of sensation, resulting in sensations such as numbness, paresthesias, . . .   Due to the sclerotomal innervation or the shoulder/hip joints the sensory phenomna can be appreciation anywhere in the peripheral distributions of the brachial/lumbosacral plexi.           Stroke highly unlikely.    R/O septic joint.         RECOMMENDATIONS    ESR, CRP, RF, CCP, SATINDER, SPEP, B12, folate, Hgb A1c     CT pelvis/hips.    X-rays L hand.      Non-con MR brain.      Yaya Fox M.D.   - Department of Neurology  [place] [facility] [employer] [facility] at [facility]                Acute L hip pain.     Acute pain IP joints of L hand.    Chronically recurring pain L shoulder.      LUE and LLE motor functional impairment.    Waxing/waning sensory disturbance LUE and LLE in the setting of polyarthralgia.    One of the adaptive responses to peripheral pain is the centrally-mediated suppression of sensation, resulting in sensations such as numbness, paresthesias, . . .   Due to the sclerotomal innervation or the shoulder/hip joints the sensory phenomena can be appreciation anywhere in the peripheral distributions of the brachial/lumbosacral plexi.           Stroke highly unlikely.    R/O septic joint.       Incidental finding of small R ICA supraclinoid aneurysm.     Lumbar spine disc disease noted on CT 2022.        RECOMMENDATIONS    ESR, CRP, RF, CCP, SATINDER, SPEP, B12, folate, Hgb A1c     CT pelvis/hips.    X-rays L hand.      Non-con MR brain.      Yaya Fox M.D.   - Department of Neurology  [place] [facility] [employer] [facility] at [facility]                Acute L hip pain.     Acute pain IP joints of L hand.    Chronically recurring pain L shoulder.      LUE and LLE motor functional impairment.    Waxing/waning sensory disturbance LUE and LLE in the setting of polyarthralgia.    One of the adaptive responses to peripheral pain is the centrally-mediated suppression of sensation, resulting in sensations such as numbness, paresthesias, . . .   Due to the sclerotomal innervation or the shoulder/hip joints the sensory phenomena can be appreciation anywhere in the peripheral distributions of the brachial/lumbosacral plexi.           Stroke highly unlikely.    R/O septic joint.       Incidental finding of small R ICA supraclinoid aneurysm.     Lumbar spine disc disease noted on CT 2022.      Overweight.    RECOMMENDATIONS    ESR, CRP, RF, CCP, SATINDER, SPEP, B12, folate, Hgb A1c     CT pelvis/hips.    X-rays L hand.      Non-con MR brain.      Yaya Fox M.D.   - Department of Neurology  [place] [facility] [employer] [facility]

## 2024-02-15 NOTE — OCCUPATIONAL THERAPY INITIAL EVALUATION ADULT - RANGE OF MOTION EXAMINATION, LOWER EXTREMITY
LLE AROM hip and knee flexion/extension grossly impaired; LLE AROM distally to knee grossly WFL./Right LE Active ROM was WFL   (within functional limits)/bilateral LE Passive ROM was WFL  (within functional limits)

## 2024-02-15 NOTE — ED ADULT NURSE NOTE - ED STAT RN HANDOFF DETAILS 2
Report given to receiving RN vitaliy patel  ,pts history, current condition and reason for admission discussed, safety concerns addressed and reviewed, pt currently in stable condition, IV flushes for patency and site shows no signs or symptoms of infiltrate, dressing is clean dry and intact, pt is aware of plan of care. Pt education deemed successful at time of report after patient demonstrates successful teach back for proficiency.

## 2024-02-15 NOTE — H&P ADULT - PROBLEM SELECTOR PLAN 1
present with left sided paralysis, numbness of left side of body  Unable to feel light touch but able to feel noxious stimuli on left side of her body  CT head  ( I personally review) with no acute intracranial pathology. CTA with no hemodynamic significant stenosis. 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 CT C spine with no acute fracture  MRI brain   Check lipid panel, A1c, neuro check, ECHO with bubble study, telemetry monitoring  Permissive HTN for 24-48 hours  Check Utox  Neurology consulted, hypercoagulable work up per neurology  Check Utox   PT/PT consult  Received aspirin and plavix in ED. Continue aspirin 81mg for now pending neurology evaluation and MRI brain

## 2024-02-15 NOTE — OCCUPATIONAL THERAPY INITIAL EVALUATION ADULT - BALANCE DISTURBANCE, IDENTIFIED IMPAIRMENT CONTRIBUTE, REHAB EVAL
impaired coordination/impaired motor control/abnormal muscle tone/pain/impaired postural control/decreased ROM/decreased sensation/impaired sensory feedback/decreased strength

## 2024-02-15 NOTE — OCCUPATIONAL THERAPY INITIAL EVALUATION ADULT - ADDITIONAL COMMENTS
Pt lives with  and two kids in a private house with 7 steps to enter with rails. Once inside, the pt has 10 steps with a rail to reach the main floor where the bedroom and bathroom is. The pt ambulates with no device and does not own any device for ambulation.

## 2024-02-15 NOTE — ED ADULT NURSE NOTE - CHIEF COMPLAINT QUOTE
no pmhx pt reports L side hemiparesis and numbness , no other neuro symptoms noted. LKN 2100 pt reports went to bed with LLE pain.

## 2024-02-15 NOTE — OCCUPATIONAL THERAPY INITIAL EVALUATION ADULT - BED MOBILITY TRAINING, PT EVAL
Patient will be able to perform bed mobility tasks independently, using least restrictive device, within 6 weeks.

## 2024-02-15 NOTE — OCCUPATIONAL THERAPY INITIAL EVALUATION ADULT - TRANSFER TRAINING, PT EVAL
Patient will be able to perform functional transfers, using least restrictive device, independently within 6 weeks.

## 2024-02-15 NOTE — ED ADULT NURSE NOTE - NSFALLRISKINTERV_ED_ALL_ED

## 2024-02-15 NOTE — ED PROVIDER NOTE - CLINICAL SUMMARY MEDICAL DECISION MAKING FREE TEXT BOX
39 y/o F no pmhx presents w/ L sided numbness/tingling. states that she felt numbness/tingling in LLE at 9PM last night initially. states that she cannot move her L side. EMS states she was unable to walk with. sharp pinpoint done at triage by nursing staff and patient had no reaction to the LUE/LLE. no facial droop. no movement of LUE/LLE on my exam. A&Ox3. no prior hx.   consider stroke, code stroke activated.  consider possible seizure leading to hans's paralysis but lower suspicion.   denies any new medications or takes any medications at home - consider possible dystonia.    labs  EKG reviewed TWI in V1/V2, no stemi. 37 y/o F no pmhx presents w/ L sided numbness/tingling. states that she felt numbness/tingling in LLE at 9PM last night initially. states that she cannot move her L side. EMS states she was unable to walk with them. sharp pinpoint done at triage by nursing staff and patient had no reaction to the LUE/LLE. no facial droop. no movement of LUE/LLE on my exam. A&Ox3. no prior hx.   consider stroke, code stroke activated.  consider possible seizure leading to hans's paralysis but lower suspicion.   differential also includes possible catatonia although less likely due to unilateral involvement, will hold off on benzo challenge for now to preserve neuro exam. consider MS, transverse myelitis - cervical spine involvement on differential.   denies any new medications or takes any medications at home - consider possible dystonia.    labs  EKG reviewed TWI in V1/V2, no stemi. 39 y/o F no pmhx presents w/ L sided numbness/tingling. states that she felt numbness/tingling in LLE at 9PM last night initially. states that she cannot move her L side. EMS states she was unable to walk with them. sharp pinpoint done at triage by nursing staff and patient had no reaction to the LUE/LLE. no facial droop. no movement of LUE/LLE on my exam. A&Ox3. no prior hx.   consider stroke, code stroke activated.  consider possible seizure leading to hans's paralysis but lower suspicion.   differential also includes possible catatonia although less likely due to unilateral involvement, will hold off on benzo challenge for now to preserve neuro exam. consider MS, transverse myelitis - cervical spine involvement on differential.   denies any new medications or takes any medications at home - consider possible dystonia.    labs  EKG reviewed TWI in V1/V2, no stemi.     Case discussed with Dr Fox - will admit for further CVA workup, neurological symptom of left arm/leg paralysis persistent and unchanged at time of admission, recommended ASA, Plavix and hypercoag workup, SATINDER etc.

## 2024-02-15 NOTE — ED ADULT TRIAGE NOTE - CHIEF COMPLAINT QUOTE
no pmhx Pt reports L side hemiparesis and decreased sensation , no other neuro symptoms noted. LKN 2100. no pmhx pt reports L side hemiparesis and numbness , no other neuro symptoms noted. LKN 2100 pt reports went to bed with LLE pain.

## 2024-02-15 NOTE — OCCUPATIONAL THERAPY INITIAL EVALUATION ADULT - IMPAIRED TRANSFERS: SIT/STAND, REHAB EVAL
ataxic/impaired balance/impaired coordination/impaired motor control/impaired postural control/decreased ROM/decreased sensation/impaired sensory feedback/decreased strength

## 2024-02-15 NOTE — ED PROVIDER NOTE - PROGRESS NOTE DETAILS
patient reassessed, denies any pain currently but still unable to move her left side. signed out to next attending pending re-evaluation, CT c-spine added for possible cervical involvement. will likely need MRI/neurology and admission for further evaluation. -Hua

## 2024-02-15 NOTE — OCCUPATIONAL THERAPY INITIAL EVALUATION ADULT - RANGE OF MOTION EXAMINATION, UPPER EXTREMITY
LUE AROM shoulder, elbow and wrist flexion/extension grossly impaired./Right UE Active ROM was WFL (within functional limits)/bilateral UE Passive ROM was WFL  (within functional limits)

## 2024-02-15 NOTE — OCCUPATIONAL THERAPY INITIAL EVALUATION ADULT - BALANCE TRAINING, PT EVAL
Patient will be able to increase static and dynamic sitting/standing by 1/2 grade in order to participate in self care tasks and functional mobility/transfers within 6 weeks

## 2024-02-15 NOTE — ED PROVIDER NOTE - OBJECTIVE STATEMENT
39 y/o F no pmhx presents w/ L sided numbness/tingling. states that she felt numbness/tingling in LLE at 9PM last night initially. states that she cannot move her L side. EMS states she was unable to walk with them. Denies recent trauma/falls. denies illness. states she went to  her child from the hair salon today prior to onset of symptoms. denies new medications - does not take any medications at home. denies recent travel. denies prior similar episodes in the past.

## 2024-02-15 NOTE — ED PROVIDER NOTE - PHYSICAL EXAMINATION
General: Well appearing female in no acute distress  HEENT: Normocephalic, atraumatic. Moist mucous membranes. Oropharynx clear. No lymphadenopathy.  Eyes: No scleral icterus. EOMI. MARIBELL.  Neck:. Soft and supple. Full ROM without pain. No midline tenderness  Cardiac: Regular rate and regular rhythm. No murmurs, rubs, gallops. Peripheral pulses 2+ and symmetric. No LE edema.  Resp: Lungs CTAB. Speaking in full sentences. No wheezes, rales or rhonchi.  Abd: Soft, non-tender, non-distended. No guarding or rebound. No scars, masses, or lesions.  Back: Spine midline and non-tender. No CVA tenderness.    Skin: No rashes, abrasions, or lacerations.  Neuro: AO x 3. no sensation on LUE and LLE to sharp and dull testing - both LUE and LLE no movement and falls to gravity when lifted. speech is clear, no dysarthria/aphasia.

## 2024-02-15 NOTE — ED PROVIDER NOTE - NS ED ROS FT
General: Denies fever, chills  HEENT: Denies sensory changes, sore throat  Neck: Denies neck pain, neck stiffness  Resp: Denies coughing, SOB  Cardiovascular: Denies CP, palpitations, LE edema  GI: Denies nausea, vomiting, abdominal pain, diarrhea, constipation, blood in stool  : Denies dysuria, hematuria, frequency, incontinence  MSK: Denies back pain  Neuro: Denies HA, dizziness, +numbness, weakness  Skin: Denies rashes.

## 2024-02-15 NOTE — H&P ADULT - HISTORY OF PRESENT ILLNESS
38 years old female with no significant medical history present to ED with complain of left sided weakness. Patient reported that she noted limbing while walking at around yesterday 9PM ( 2/14/24) when she went to  her child. Patient went to bed and woke up at 3:5AM and unable to move left side at all. No recent trauma. Patient reported that she started to have migraine headache lately and was referred to see a neurologist but has not done so yet.   Hemodynamically stable, afebrile, sat well at RA. EKG with NSR. No leukocytosis, plt 337, K 4.2, lactate 1.4. Serum alcohol negative. CT head with no acute intracranial pathology. CTA with no hemodynamic significant stenosis. 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 CT C spine with no acute fracture    SH: no toxic habits  FH: HTN, DM, Cancer

## 2024-02-15 NOTE — ED ADULT NURSE NOTE - OBJECTIVE STATEMENT
Pt presents a&ox4 c/o waking up from a nap at 9PM last night with pain, weakness and numbness to the left leg. Pt went back to sleep, then woke up at 3AM unable to move her left leg or left arm. Pt's respirations are even and unlabored on room air. Speech is clear, face is symmetrical. No sensation in left leg or left arm, however, noted that when trying to move left arm, pt c/o pain. Pt has never experienced this before. History of migraines, meant to see neurologist soon. During head CT, became nauseous and had one episode of emesis, but symptoms resolved. Pt on cardiac monitor. Safety and fall precautions are in place.

## 2024-02-15 NOTE — PATIENT PROFILE ADULT - FALL HARM RISK - RISK INTERVENTIONS
Assistance OOB with selected safe patient handling equipment/Assistance with ambulation/Communicate Fall Risk and Risk Factors to all staff, patient, and family/Discuss with provider need for PT consult/Monitor gait and stability/Reinforce activity limits and safety measures with patient and family/Visual Cue: Yellow wristband/Bed in lowest position, wheels locked, appropriate side rails in place/Call bell, personal items and telephone in reach/Instruct patient to call for assistance before getting out of bed or chair/Non-slip footwear when patient is out of bed/[place] to call system/Physically safe environment - no spills, clutter or unnecessary equipment/Purposeful Proactive Rounding/Room/bathroom lighting operational, light cord in reach
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
## 2024-02-15 NOTE — ED ADULT NURSE NOTE - ED STAT RN HANDOFF DETAILS
Report endorsed to oncoming RN Cyndie. Safety checks compld this shift.  IV sites checked Q2+remains WDL. Fall +skin precs in place. Any issues endorsed to oncoming RN for follow up.

## 2024-02-15 NOTE — OCCUPATIONAL THERAPY INITIAL EVALUATION ADULT - PERTINENT HX OF CURRENT PROBLEM, REHAB EVAL
As per H&P; 38 years old female with no significant medical history present to ED with complain of left sided weakness. Patient reported that she noted limbing while walking at around yesterday 9PM ( 2/14/24) when she went to  her child. Patient went to bed and woke up at 3:5AM and unable to move left side at all. No recent trauma. Patient reported that she started to have migraine headache lately and was referred to see a neurologist but has not done so yet.   Hemodynamically stable, afebrile, sat well at RA. EKG with NSR. No leukocytosis, plt 337, K 4.2, lactate 1.4. Serum alcohol negative. CT head with no acute intracranial pathology. CTA with no hemodynamic significant stenosis. 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 CT C spine with no acute fracture

## 2024-02-16 ENCOUNTER — RESULT REVIEW (OUTPATIENT)
Age: 39
End: 2024-02-16

## 2024-02-16 LAB
A1C WITH ESTIMATED AVERAGE GLUCOSE RESULT: 6.2 % — HIGH (ref 4–5.6)
ALBUMIN SERPL ELPH-MCNC: 3.3 G/DL — SIGNIFICANT CHANGE UP (ref 3.3–5)
ALP SERPL-CCNC: 56 U/L — SIGNIFICANT CHANGE UP (ref 40–120)
ALT FLD-CCNC: 24 U/L — SIGNIFICANT CHANGE UP (ref 12–78)
AMPHET UR-MCNC: NEGATIVE — SIGNIFICANT CHANGE UP
ANA PAT FLD IF-IMP: ABNORMAL
ANA TITR SER: ABNORMAL
ANION GAP SERPL CALC-SCNC: 4 MMOL/L — LOW (ref 5–17)
APCR PPP: 2.61 RATIO — SIGNIFICANT CHANGE UP
AST SERPL-CCNC: 21 U/L — SIGNIFICANT CHANGE UP (ref 15–37)
AT III ACT/NOR PPP CHRO: 87 % — SIGNIFICANT CHANGE UP (ref 85–135)
B2 GLYCOPROT1 AB SER QL: NEGATIVE — SIGNIFICANT CHANGE UP
BARBITURATES UR SCN-MCNC: NEGATIVE — SIGNIFICANT CHANGE UP
BENZODIAZ UR-MCNC: NEGATIVE — SIGNIFICANT CHANGE UP
BILIRUB SERPL-MCNC: 0.5 MG/DL — SIGNIFICANT CHANGE UP (ref 0.2–1.2)
BUN SERPL-MCNC: 14 MG/DL — SIGNIFICANT CHANGE UP (ref 7–23)
CALCIUM SERPL-MCNC: 9 MG/DL — SIGNIFICANT CHANGE UP (ref 8.5–10.1)
CHLORIDE SERPL-SCNC: 109 MMOL/L — HIGH (ref 96–108)
CO2 SERPL-SCNC: 26 MMOL/L — SIGNIFICANT CHANGE UP (ref 22–31)
COCAINE METAB.OTHER UR-MCNC: NEGATIVE — SIGNIFICANT CHANGE UP
CREAT SERPL-MCNC: 0.8 MG/DL — SIGNIFICANT CHANGE UP (ref 0.5–1.3)
CRP SERPL-MCNC: <3 MG/L — SIGNIFICANT CHANGE UP
CULTURE RESULTS: SIGNIFICANT CHANGE UP
DRVVT RATIO: 0.88 RATIO — SIGNIFICANT CHANGE UP (ref 0–1.21)
DRVVT SCREEN TO CONFIRM RATIO: SIGNIFICANT CHANGE UP
EGFR: 95 ML/MIN/1.73M2 — SIGNIFICANT CHANGE UP
ERYTHROCYTE [SEDIMENTATION RATE] IN BLOOD: 7 MM/HR — SIGNIFICANT CHANGE UP (ref 0–15)
ESTIMATED AVERAGE GLUCOSE: 131 MG/DL — HIGH (ref 68–114)
GLUCOSE SERPL-MCNC: 106 MG/DL — HIGH (ref 70–99)
HCT VFR BLD CALC: 37.2 % — SIGNIFICANT CHANGE UP (ref 34.5–45)
HEMOGLOBIN INTERPRETATION: SIGNIFICANT CHANGE UP
HGB A MFR BLD: 97.6 % — SIGNIFICANT CHANGE UP (ref 95.8–98)
HGB A2 MFR BLD: 2.4 % — SIGNIFICANT CHANGE UP (ref 2–3.2)
HGB BLD-MCNC: 11.6 G/DL — SIGNIFICANT CHANGE UP (ref 11.5–15.5)
MAGNESIUM SERPL-MCNC: 1.9 MG/DL — SIGNIFICANT CHANGE UP (ref 1.6–2.6)
MCHC RBC-ENTMCNC: 25.5 PG — LOW (ref 27–34)
MCHC RBC-ENTMCNC: 31.2 G/DL — LOW (ref 32–36)
MCV RBC AUTO: 81.8 FL — SIGNIFICANT CHANGE UP (ref 80–100)
METHADONE UR-MCNC: NEGATIVE — SIGNIFICANT CHANGE UP
NRBC # BLD: 0 /100 WBCS — SIGNIFICANT CHANGE UP (ref 0–0)
OPIATES UR-MCNC: NEGATIVE — SIGNIFICANT CHANGE UP
PCP SPEC-MCNC: SIGNIFICANT CHANGE UP
PCP UR-MCNC: NEGATIVE — SIGNIFICANT CHANGE UP
PHOSPHATE SERPL-MCNC: 3.5 MG/DL — SIGNIFICANT CHANGE UP (ref 2.5–4.5)
PLATELET # BLD AUTO: 289 K/UL — SIGNIFICANT CHANGE UP (ref 150–400)
POTASSIUM SERPL-MCNC: 4.3 MMOL/L — SIGNIFICANT CHANGE UP (ref 3.5–5.3)
POTASSIUM SERPL-SCNC: 4.3 MMOL/L — SIGNIFICANT CHANGE UP (ref 3.5–5.3)
PROT C ACT/NOR PPP: 106 % — SIGNIFICANT CHANGE UP (ref 74–150)
PROT SERPL-MCNC: 7.1 GM/DL — SIGNIFICANT CHANGE UP (ref 6–8.3)
RBC # BLD: 4.55 M/UL — SIGNIFICANT CHANGE UP (ref 3.8–5.2)
RBC # FLD: 14.9 % — HIGH (ref 10.3–14.5)
RHEUMATOID FACT SERPL-ACNC: <10 IU/ML — SIGNIFICANT CHANGE UP (ref 0–13)
SODIUM SERPL-SCNC: 139 MMOL/L — SIGNIFICANT CHANGE UP (ref 135–145)
SPECIMEN SOURCE: SIGNIFICANT CHANGE UP
THC UR QL: NEGATIVE — SIGNIFICANT CHANGE UP
WBC # BLD: 4.87 K/UL — SIGNIFICANT CHANGE UP (ref 3.8–10.5)
WBC # FLD AUTO: 4.87 K/UL — SIGNIFICANT CHANGE UP (ref 3.8–10.5)

## 2024-02-16 PROCEDURE — 99232 SBSQ HOSP IP/OBS MODERATE 35: CPT

## 2024-02-16 PROCEDURE — 93306 TTE W/DOPPLER COMPLETE: CPT | Mod: 26

## 2024-02-16 PROCEDURE — 70551 MRI BRAIN STEM W/O DYE: CPT | Mod: 26

## 2024-02-16 RX ORDER — ACETAMINOPHEN 500 MG
650 TABLET ORAL ONCE
Refills: 0 | Status: COMPLETED | OUTPATIENT
Start: 2024-02-16 | End: 2024-02-16

## 2024-02-16 RX ORDER — KETOROLAC TROMETHAMINE 30 MG/ML
15 SYRINGE (ML) INJECTION ONCE
Refills: 0 | Status: DISCONTINUED | OUTPATIENT
Start: 2024-02-16 | End: 2024-02-16

## 2024-02-16 RX ADMIN — Medication 650 MILLIGRAM(S): at 08:00

## 2024-02-16 RX ADMIN — Medication 650 MILLIGRAM(S): at 06:38

## 2024-02-16 RX ADMIN — Medication 81 MILLIGRAM(S): at 11:31

## 2024-02-16 RX ADMIN — Medication 15 MILLIGRAM(S): at 11:31

## 2024-02-16 RX ADMIN — ATORVASTATIN CALCIUM 40 MILLIGRAM(S): 80 TABLET, FILM COATED ORAL at 22:31

## 2024-02-16 RX ADMIN — ENOXAPARIN SODIUM 40 MILLIGRAM(S): 100 INJECTION SUBCUTANEOUS at 13:22

## 2024-02-16 RX ADMIN — Medication 15 MILLIGRAM(S): at 13:25

## 2024-02-16 NOTE — SWALLOW BEDSIDE ASSESSMENT ADULT - SWALLOW EVAL: DIAGNOSIS
pt presented with oropharyngeal phases of swallow WFL for regular consistency, soft solid and thin liquid. oral phase marked by adequate labial seal/oral containment, functional mastication/breakdown of solid, adequate bolus manipulation /formation and AP transport. +initiation of pharyngeal swallow trigger and hyolaryngeal elevation to palpation. no overt clinical signs of airway penetration.

## 2024-02-16 NOTE — PHYSICAL THERAPY INITIAL EVALUATION ADULT - DIAGNOSIS, PT EVAL
Pt presents with c/o numbness, tingling and weakness in the LUE and LLE, unsteady standing and ambulation balance.

## 2024-02-16 NOTE — PHYSICAL THERAPY INITIAL EVALUATION ADULT - PERTINENT HX OF CURRENT PROBLEM, REHAB EVAL
Pt states she was in her usual state of health when she was admitted in ED due to c/o L sided weakness, numbness, tingling sensation and unsteady ambulation. MRI 2/16 no evidence of mass or evidence of acute ischemia CT brain /15 , no evidence of intracranial hemorrhage, midline shift or acute territorial infact.

## 2024-02-16 NOTE — SWALLOW BEDSIDE ASSESSMENT ADULT - COMMENTS
CT head no contrast 2/15/2024 IMPRESSION: No evidence of acute intracranial hemorrhage, midline shift or CT evidence of acute territorial infarct.

## 2024-02-16 NOTE — PHYSICAL THERAPY INITIAL EVALUATION ADULT - GENERAL OBSERVATIONS, REHAB EVAL
Pt is alert, oriented x 4 , follow instructions, coherent, blurry vision, no slurring of speech, c/o headaches, numbness,tingling and weakness in the LUE , LLE and unsteady ambulation.

## 2024-02-16 NOTE — SWALLOW BEDSIDE ASSESSMENT ADULT - SLP GENERAL OBSERVATIONS
pt seen bedside in telemetry sitting upright on RA and alert. pt responded to simple questions for assessment, she verbalized wants and needs and was able to follow directives. speech intelligibility and voice subjectively judged to be WFL.

## 2024-02-16 NOTE — SPEECH LANGUAGE PATHOLOGY EVALUATION - SLP DIAGNOSIS
pt presented with receptive and expressive language WFL. receptive language marked by adequate word recognition for pictures, colors body parts and left/right discrimination; adequate auditory comprehension for personal and factual yes/no questions and 1&2 step directions; and adequate auditory processing for complex yes /no questions.  adequate oral reading for single words and reading comprehension for phrases/ sentences. expressive language marked by adequate automatic sequences; repetition for words/phrases and adequate responsive/confrontational naming. motor speech WFL

## 2024-02-16 NOTE — PHYSICAL THERAPY INITIAL EVALUATION ADULT - LIVES WITH, PROFILE
P states she lives in a pvt house with  and 2 children ,  has 7 steps with rail on one side to enter the house and another 10 steps with rail to the 2nd floor in a 2 family house.

## 2024-02-16 NOTE — PROGRESS NOTE ADULT - ASSESSMENT
38 years old female with no significant medical history present to ED with complain of left sided weakness. Patient reported that she noted limbing while walking at around yesterday 9PM ( 2/14/24) when she went to  her child. Patient went to bed and woke up at 3:5AM and unable to move left side at all. No recent trauma. Patient reported that she started to have migraine headache lately and was referred to see a neurologist but has not done so yet.   Hemodynamically stable, afebrile, sat well at RA. EKG with NSR. No leukocytosis, plt 337, K 4.2, lactate 1.4. Serum alcohol negative. CT head with no acute intracranial pathology. CTA with no hemodynamic significant stenosis. 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 CT C spine with no acute fracture       Problem/Plan - 1:  ·  Problem: Acute CVA (cerebrovascular accident).   ·  Plan: present with left sided paralysis, numbness of left side of body  Unable to feel light touch but able to feel noxious stimuli on left side of her body  CT head   with no acute intracranial pathology. CTA with no hemodynamic significant stenosis. 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 CT C spine with no acute fracture  Tele: sinus 73   EKG: NSR , QTc 422   MRI brain   Check lipid panel, A1c, neuro check, ECHO with bubble study,   Permissive HTN for 24-48 hours  Check Utox  Neurology consulted, hypercoagulable work up per neurology  Check Utox   PT/oT consult  Received aspirin and plavix in ED.   Continue aspirin 81mg , statin

## 2024-02-16 NOTE — CHART NOTE - NSCHARTNOTEFT_GEN_A_CORE
To whom it may concern:     ANDREZ HENRY has been under inpatient care at [facility] since 2/15/24.      If there are any questions please do not hesitate to call.    Pipe SANDS  on behalf of Dr. Amador  [phone]

## 2024-02-16 NOTE — PROGRESS NOTE ADULT - SUBJECTIVE AND OBJECTIVE BOX
This is in follow-up to my initial Neurology Consult note yesterday.  Hx as detailed therein.      Results of recommended work-up:        Per radiology report of non-con MR head:  "COMPARISON:  Exam is compared to head CT of 2/15/2024.    FINDINGS:  BRAIN PARENCHYMA:  Sulci and gyri are normal. No current evidence of diffusion restriction   to suggest acute ischemia.  Few punctate foci of increased T2 signal within the cerebral white matter   which are nonspecific.  No parenchymal mass or mass effect. No intracranial blood products.  Corpus callosum well formed. No cerebellar tonsillar ectopia.    VENTRICLES:  There is no hydrocephalus.   There is no midline shift.    EXTRA-AXIAL:  No extra-axial mass.  No evidence of a subdural or epidural collection.    Patent basal cisterns.    OTHER:  No air-fluid levels within the paranasal sinuses.    IMPRESSION:  No evidence of a mass or current evidence of acute ischemia"      ESR/CRP  7/<3,   RF  neg   CCP  apparently not ordered   SATINDER   1:80 speckled  SPEP  apparently not ordered   B12/folate  apparently not ordered   Hgb A1c  6.2%    X ray L hand   apparently not ordered    CT hip/pelvis - Pt had x rays of L shoulder and L hip.  Per the radiology report:  "INTERPRETATION:  Left shoulder and left hip. Patient has local pain.    Left shoulder. 2 views.    There is mild degeneration. No fracture.    Left hip. 2 views. Left hip appears free of degeneration. No fracture.    IMPRESSION: No acute finding."    
PROGRESS NOTE:     Patient is a 38y old  Female who presents with a chief complaint of acute CVA (15 Feb 2024 15:55)        SUBJECTIVE & OBJECTIVE:   Pt seen and examined at bedside in AM    no overnight events.   no new complaints  states left sided weakness is improving, she was able to ambulate without much difficulty     REVIEW OF SYSTEMS: remaining ROS negative     PHYSICAL EXAM:  T(C): 36.6 (02-16-24 @ 17:42), Max: 37 (02-15-24 @ 23:16)  HR: 68 (02-16-24 @ 17:42) (55 - 77)  BP: 120/71 (02-16-24 @ 17:42) (100/63 - 123/73)  RR: 17 (02-16-24 @ 17:42) (17 - 18)  SpO2: 97% (02-16-24 @ 17:42) (97% - 98%)            GENERAL: NAD,  no increased WOB  HEAD:  Atraumatic, Normocephalic  EYES: EOMI, PERRLA, conjunctiva and sclera clear  ENMT: Moist mucous membranes  NECK: Supple, No JVD  NERVOUS SYSTEM:  Alert & Oriented X3, left sided strength 3/5; right side strength 5/5, no difficulty with speech or swallow   CHEST/LUNG: Clear to auscultation bilaterally; No rales, rhonchi, wheezing, or rubs  HEART: Regular rate and rhythm; No murmurs, rubs, or gallops  ABDOMEN: Soft, Nontender, Nondistended; Bowel sounds present  EXTREMITIES:  2+ Peripheral Pulses b/l, No clubbing, cyanosis, calf tenderness calf tenderness or edema b/l    MEDICATIONS  (STANDING):  aspirin enteric coated 81 milliGRAM(s) Oral daily  atorvastatin 40 milliGRAM(s) Oral at bedtime  enoxaparin Injectable 40 milliGRAM(s) SubCutaneous every 24 hours    MEDICATIONS  (PRN):      LABS:                        11.6   4.87  )-----------( 289      ( 16 Feb 2024 07:15 )             37.2     02-16    139  |  109<H>  |  14  ----------------------------<  106<H>  4.3   |  26  |  0.80    Ca    9.0      16 Feb 2024 07:15  Phos  3.5     02-16  Mg     1.9     02-16    TPro  7.1  /  Alb  3.3  /  TBili  0.5  /  DBili  x   /  AST  21  /  ALT  24  /  AlkPhos  56  02-16    PT/INR - ( 15 Feb 2024 06:28 )   PT: 11.1 sec;   INR: 0.92 ratio         PTT - ( 15 Feb 2024 06:28 )  PTT:34.2 sec  Urinalysis Basic - ( 16 Feb 2024 07:15 )    Color: x / Appearance: x / SG: x / pH: x  Gluc: 106 mg/dL / Ketone: x  / Bili: x / Urobili: x   Blood: x / Protein: x / Nitrite: x   Leuk Esterase: x / RBC: x / WBC x   Sq Epi: x / Non Sq Epi: x / Bacteria: x      Magnesium: 1.9 mg/dL (02-16 @ 07:15)    CAPILLARY BLOOD GLUCOSE          CARDIAC MARKERS ( 15 Feb 2024 06:28 )  x     / x     / 147 U/L / x     / x            RECENT CULTURES:  Urine culture:  02-15 @ 08:20 --   <10,000 CFU/mL Normal Urogenital Cindy    Clean Catch Clean Catch (Midstream)  02-15 @ 08:20   <10,000 CFU/mL Normal Urogenital Cindy  --  --            RADIOLOGY & ADDITIONAL TESTS:          Radiology reports read and imaging reviewed  :  [ x] YES  [ ] NO  (I am not a radiologist and therefore rely on Radiologist reports to facilitate with diagnosis and treatment plans)    Consultant(s) Notes Reviewed:  [x ] YES  [ ] NO    Care Discussed with Consultants/Other Providers [x ] YES  [ ] NO  Care plan and all findings were discussed in detail with patient.  All questions and concerns addressed

## 2024-02-16 NOTE — PHYSICAL THERAPY INITIAL EVALUATION ADULT - STRENGTHENING, PT EVAL
Improve strength in the LUE and LLE to 5/5, improve general endurance to good and be able to perform functional tasks-bed mobility, sitting, standing, transfers and ambulate in a safe manner with or without  assistive device and prevent falls.

## 2024-02-16 NOTE — SWALLOW BEDSIDE ASSESSMENT ADULT - H & P REVIEW
"38 years old female with no significant medical history present to ED with complain of left sided weakness. Patient reported that she noted limbing while walking at around yesterday 9PM ( 2/14/24) when she went to  her child. Patient went to bed and woke up at 3:5AM and unable to move left side at all. No recent trauma. Patient reported that she started to have migraine headache lately and was referred to see a neurologist but has not done so yet."/yes

## 2024-02-16 NOTE — SPEECH LANGUAGE PATHOLOGY EVALUATION - SLP GENERAL OBSERVATIONS
pt seen bedside sitting upright on RA and alert. pt c/o HA RN aware. pt motivation and willing to participate in eval of receptive and expressive language. administered functional communication assessment

## 2024-02-16 NOTE — PHYSICAL THERAPY INITIAL EVALUATION ADULT - RISK REDUCTION/PREVENTION, PT EVAL
c/o numbness , tingling and weakness in the LUE and LLE , unteady standing and ambulation balance/risk factors

## 2024-02-16 NOTE — PROGRESS NOTE ADULT - ASSESSMENT
Acute L hip pain.     Acute pain IP joints of L hand.    Chronically recurring pain L shoulder.      LUE and LLE motor functional impairment, not true weakness, improving.  .    Waxing/waning sensory disturbance LUE and LLE in the setting of polyarthralgia.    One of the adaptive responses to peripheral pain is the centrally-mediated suppression of sensation, resulting in sensations such as numbness, paresthesias, . . .   Due to the sclerotomal innervation or the shoulder/hip joints the sensory phenomna can be appreciation anywhere in the peripheral distributions of the brachial/lumbosacral plexi.           Not a stroke or TIA.    Pre-diabetes.    SATINDER weakly positive.        Obesity.      RECOMMENDATIONS    Please order CCP, repeat SATINDER, SPEP, B12, folate, anti DS-DNA.    Out-Pt rheumatology follow-up.    No neurologic indication for continued antiplatelet Rx.      Yaya Fox M.D.   - Department of Neurology  [place] [facility] [place] [facility] [employer] [facility]       Acute L hip pain.     Acute pain IP joints of L hand.    Chronically recurring pain L shoulder.      LUE and LLE motor functional impairment, not true weakness, improving.  .    Waxing/waning sensory disturbance LUE and LLE in the setting of polyarthralgia.    One of the adaptive responses to peripheral pain is the centrally-mediated suppression of sensation, resulting in sensations such as numbness, paresthesias, . . .   Due to the sclerotomal innervation or the shoulder/hip joints the sensory phenomna can be appreciation anywhere in the peripheral distributions of the brachial/lumbosacral plexi.           Not a stroke or TIA.    Pre-diabetes.    SATINDER weakly positive.        Obesity.      RECOMMENDATIONS    Please order CCP, repeat SATINDER, SPEP, B12, folate, anti DS-DNA, methylmalonic acid/homocysteine..    Out-Pt rheumatology follow-up.    No neurologic indication for continued antiplatelet Rx.      Yaya Fox M.D.   - Department of Neurology  [place] [facility] [place] [facility] [employer] [facility]       Acute L hip pain.     Acute pain IP joints of L hand.    Chronically recurring pain L shoulder.      LUE and LLE motor functional impairment, not true weakness, improving.      Waxing/waning sensory disturbance LUE and LLE in the setting of polyarthralgia.    One of the adaptive responses to peripheral pain is the centrally-mediated suppression of sensation, resulting in sensations such as numbness, paresthesias, . . .   Due to the sclerotomal innervation or the shoulder/hip joints the sensory phenomna can be appreciation anywhere in the peripheral distributions of the brachial/lumbosacral plexi.           Not a stroke or TIA.    Pre-diabetes.    SATINDER weakly positive.        Obesity.    Incidental finding of R ICA small supraclinoid aneurysm.    Multilevel spondylosis, disc disease, L spine.        RECOMMENDATIONS    Please order CCP, repeat SATINDER, SPEP, B12, folate, anti DS-DNA, methylmalonic acid/homocysteine..    Out-Pt rheumatology follow-up.    No neurologic indication for continued antiplatelet Rx.      Out-Pt follow-up for incidental aneurysm.     Yaya Fox M.D.   - Department of Neurology  [place] [facility] [place] [facility] [employer] [facility]       Acute L hip pain.     Acute pain IP joints of L hand.    Chronically recurring pain L shoulder.      LUE and LLE motor functional impairment, not true weakness, improving.      Waxing/waning sensory disturbance LUE and LLE in the setting of polyarthralgia.    One of the adaptive responses to peripheral pain is the centrally-mediated suppression of sensation, resulting in sensations such as numbness, paresthesias, . . .   Due to the sclerotomal innervation or the shoulder/hip joints the sensory phenomna can be appreciation anywhere in the peripheral distributions of the brachial/lumbosacral plexi.           Not a stroke or TIA.    Pre-diabetes.    SATINDER weakly positive.        Overweight.    Incidental finding of R ICA small supraclinoid aneurysm.    Multilevel spondylosis, disc disease, L spine.        RECOMMENDATIONS    Please order CCP, repeat SATINDER, SPEP, B12, folate, anti DS-DNA, methylmalonic acid/homocysteine..    Out-Pt rheumatology follow-up.    No neurologic indication for continued antiplatelet Rx.      Out-Pt follow-up for incidental aneurysm.     Yaya Fox M.D.   - Department of Neurology  [place] [facility] [employer] [facility]

## 2024-02-17 ENCOUNTER — TRANSCRIPTION ENCOUNTER (OUTPATIENT)
Age: 39
End: 2024-02-17

## 2024-02-17 VITALS
HEART RATE: 73 BPM | SYSTOLIC BLOOD PRESSURE: 113 MMHG | DIASTOLIC BLOOD PRESSURE: 77 MMHG | OXYGEN SATURATION: 99 % | TEMPERATURE: 98 F | RESPIRATION RATE: 16 BRPM

## 2024-02-17 LAB — CARDIOLIPIN AB SER-ACNC: NEGATIVE — SIGNIFICANT CHANGE UP

## 2024-02-17 PROCEDURE — 99223 1ST HOSP IP/OBS HIGH 75: CPT

## 2024-02-17 PROCEDURE — 99239 HOSP IP/OBS DSCHRG MGMT >30: CPT

## 2024-02-17 RX ORDER — ASPIRIN/CALCIUM CARB/MAGNESIUM 324 MG
1 TABLET ORAL
Qty: 30 | Refills: 2
Start: 2024-02-17 | End: 2024-05-16

## 2024-02-17 RX ORDER — ATORVASTATIN CALCIUM 80 MG/1
1 TABLET, FILM COATED ORAL
Qty: 30 | Refills: 0
Start: 2024-02-17 | End: 2024-03-17

## 2024-02-17 RX ORDER — ASPIRIN/CALCIUM CARB/MAGNESIUM 324 MG
1 TABLET ORAL
Qty: 30 | Refills: 0
Start: 2024-02-17 | End: 2024-03-17

## 2024-02-17 RX ORDER — ATORVASTATIN CALCIUM 80 MG/1
1 TABLET, FILM COATED ORAL
Qty: 30 | Refills: 2
Start: 2024-02-17 | End: 2024-05-16

## 2024-02-17 RX ORDER — ASPIRIN/CALCIUM CARB/MAGNESIUM 324 MG
1 TABLET ORAL
Qty: 0 | Refills: 0 | DISCHARGE
Start: 2024-02-17

## 2024-02-17 RX ADMIN — Medication 81 MILLIGRAM(S): at 12:03

## 2024-02-17 RX ADMIN — ENOXAPARIN SODIUM 40 MILLIGRAM(S): 100 INJECTION SUBCUTANEOUS at 14:00

## 2024-02-17 NOTE — DISCHARGE NOTE PROVIDER - NSDCDCMDCOMP_GEN_ALL_CORE
Spoke to patient's daughter who states that patient has a new PCP due to her insurance. She will not be coming back to this office. This document is complete and the patient is ready for discharge.

## 2024-02-17 NOTE — DISCHARGE NOTE NURSING/CASE MANAGEMENT/SOCIAL WORK - NSDCFUADDAPPT_GEN_ALL_CORE_FT
It is important to see your primary physician as well as other necessary consultants within the next week to perform a comprehensive medical review.  Call their offices for an appointment as soon as you leave the hospital.  If you do not have a primary physician or cant reach him/her, contact the [facility] Physician Referral Service at [phone].  Your medical issues appear to be stable at this time, but if your symptoms recur or worsen, contact your physicians and/or return to the hospital if necessary.  If you encounter any issues or questions with your medication, call your physicians before stopping the medication.

## 2024-02-17 NOTE — DISCHARGE NOTE PROVIDER - HOSPITAL COURSE
38 years old female with no significant medical history present to ED with complain of left sided weakness. Patient reported that she noted limbing while walking at around yesterday 9PM ( 2/14/24) when she went to  her child. Patient went to bed and woke up at 3:5AM and unable to move left side at all. No recent trauma. Patient reported that she started to have migraine headache lately and was referred to see a neurologist but has not done so yet.   Hemodynamically stable, afebrile, sat well at RA. EKG with NSR. No leukocytosis, plt 337, K 4.2, lactate 1.4. Serum alcohol negative. CT head with no acute intracranial pathology. CTA with no hemodynamic significant stenosis. 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 CT C spine with no acute fracture    Vital Signs Last 24 Hrs  T(C): 36.6 (17 Feb 2024 13:18), Max: 36.7 (16 Feb 2024 16:12)  T(F): 97.9 (17 Feb 2024 13:18), Max: 98.1 (16 Feb 2024 16:12)  HR: 72 (17 Feb 2024 13:18) (58 - 84)  BP: 116/77 (17 Feb 2024 13:18) (101/62 - 122/78)  BP(mean): --  RR: 19 (17 Feb 2024 13:18) (17 - 19)  SpO2: 99% (17 Feb 2024 13:18) (97% - 99%)    Parameters below as of 17 Feb 2024 13:18  Patient On (Oxygen Delivery Method): room air  GENERAL: NAD, no increased WOB  NERVOUS SYSTEM:  Alert & Oriented X3, Good concentration; nonfocal >>symptoms resolved , ambulating independently   CHEST/LUNG: CTAB;  No rales, rhonchi, wheezing, or rubs  HEART: Regular rate and rhythm; No murmurs, rubs, or gallops  ABDOMEN: Soft, Nontender, Nondistended; Bowel sounds present  EXTREMITIES:  2+ Peripheral Pulses b/l, No clubbing, cyanosis, calf tenderness or edema b/l          Acute CVA (cerebrovascular accident).   --- present with left sided paralysis, numbness of left side of body  Unable to feel light touch but able to feel noxious stimuli on left side of her body  CT head   with no acute intracranial pathology. CTA with no hemodynamic significant stenosis. 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 CT C spine with no acute fracture  Tele: sinus 73   EKG: NSR , QTc 422   MRI brain   Check lipid panel, A1c, neuro check, ECHO with bubble study,   Check Utox  Neurology consulted, hypercoagulable work up per neurology  PT/OT consult, patient refusing rehab , including outpatient PT.   Received aspirin and plavix in ED.   Continue aspirin 81mg , statin   cardiology consulted     Discharge time : 40 min   RETURN PARAMETERS DISCUSSED WITH PATIENT, PATIENT EXPRESSED UNDERSTANDING AND IS AGREEABLE. DISCUSSED WITH PATIENT ON REFRAINING FROM DRIVING UNTIL FOLLOW-UP/ CLEARED BY PMD. PATIENT EXPRESSED UNDERSTANDING.   Care plan and all findings were discussed in detail with patient.  All questions and concerns addressed

## 2024-02-17 NOTE — DISCHARGE NOTE NURSING/CASE MANAGEMENT/SOCIAL WORK - PATIENT PORTAL LINK FT
You can access the FollowMyHealth Patient Portal offered by [facility] by registering at the following website: [email or web address]. By joining [facility]’s FollowMyHealth portal, you will also be able to view your health information using other applications (apps) compatible with our system.

## 2024-02-17 NOTE — DISCHARGE NOTE PROVIDER - NSDCFUADDAPPT_GEN_ALL_CORE_FT
It is important to see your primary physician as well as other necessary consultants within the next week to perform a comprehensive medical review.  Call their offices for an appointment as soon as you leave the hospital.  If you do not have a primary physician or cant reach him/her, contact the [facility] Physician Referral Service at [phone].  Your medical issues appear to be stable at this time, but if your symptoms recur or worsen, contact your physicians and/or return to the hospital if necessary.  If you encounter any issues or questions with your medication, call your physicians before stopping the medication.

## 2024-02-17 NOTE — CONSULT NOTE ADULT - ASSESSMENT
37yo lady with no PMH; presented with left sided paralysis, numbness of left side of body  Unable to feel light touch but able to feel noxious stimuli on left side of her body  CT head: no acute intracranial pathology. CTA with no hemodynamic significant stenosis. 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 CT C spine with no acute fracture  Tele: sinus 70-80s  EKG: NSR   Echo: nl LVEF, mild-mod TR, +PFO  Received aspirin and plavix in ED.   Continue aspirin 81mg , statin.  Seen by neuro... NOT a CVA or TIA... suspect rheum process w/ +SATINDER    Incidental small ASD/PFO on echo, without cardioembolic event as per neuro.  No indication for further evaluation for closure at this time.

## 2024-02-17 NOTE — CONSULT NOTE ADULT - SUBJECTIVE AND OBJECTIVE BOX
CARDIOLOGY CONSULT NOTE    Patient is a 38y Female with a known history of :  Acute CVA (cerebrovascular accident) [I63.9]      HPI:  39yo lady with no PMH; presented with left sided paralysis, numbness of left side of body  Unable to feel light touch but able to feel noxious stimuli on left side of her body  CT head: no acute intracranial pathology. CTA with no hemodynamic significant stenosis. 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 CT C spine with no acute fracture  Tele: sinus 70-80s  EKG: NSR   Echo: nl LVEF, mild-mod TR, +PFO  Received aspirin and plavix in ED.   Continue aspirin 81mg , statin.  Seen by neuro... NOT a CVA or TIA... suspect rheum process w/ +SATINEDR      REVIEW OF SYSTEMS:  CONSTITUTIONAL: No fever, weight loss, or fatigue  EYES: No eye pain, visual disturbances, or discharge  ENMT:  No difficulty hearing, tinnitus, vertigo; No sinus or throat pain  NECK: No pain or stiffness  BREASTS: No pain, masses, or nipple discharge  RESPIRATORY: No cough, wheezing, chills or hemoptysis; No shortness of breath  CARDIOVASCULAR: No chest pain, palpitations, dizziness, or leg swelling  GASTROINTESTINAL: No abdominal or epigastric pain. No nausea, vomiting, or hematemesis; No diarrhea or constipation. No melena or hematochezia.  GENITOURINARY: No dysuria, frequency, hematuria, or incontinence  NEUROLOGICAL: No headaches, memory loss, loss of strength, numbness, or tremors  SKIN: No itching, burning, rashes, or lesions   LYMPH NODES: No enlarged glands  ENDOCRINE: No heat or cold intolerance; No hair loss  MUSCULOSKELETAL: No joint pain or swelling; No muscle, back, or extremity pain  PSYCHIATRIC: No depression, anxiety, mood swings, or difficulty sleeping  HEME/LYMPH: No easy bruising, or bleeding gums  ALLERGY AND IMMUNOLOGIC: No hives or eczema    MEDICATIONS  (STANDING):  aspirin enteric coated 81 milliGRAM(s) Oral daily  atorvastatin 40 milliGRAM(s) Oral at bedtime  enoxaparin Injectable 40 milliGRAM(s) SubCutaneous every 24 hours    MEDICATIONS  (PRN):      ALLERGIES: No Known Drug Allergies  latex (Rash; Urticaria)      FAMILY HISTORY:  No pertinent family history in first degree relatives        PHYSICAL EXAMINATION:  -----------------------------  T(C): 36.6 (02-17-24 @ 13:18), Max: 36.7 (02-16-24 @ 16:12)  HR: 72 (02-17-24 @ 13:18) (58 - 84)  BP: 116/77 (02-17-24 @ 13:18) (101/62 - 122/78)  RR: 19 (02-17-24 @ 13:18) (17 - 19)  SpO2: 99% (02-17-24 @ 13:18) (97% - 99%)    Constitutional: well developed, normal appearance, well groomed, well nourished, no deformities and no acute distress.   Eyes: the conjunctiva exhibited no abnormalities and the eyelids demonstrated no xanthelasmas.   HEENT: normal oral mucosa, no oral pallor and no oral cyanosis.   Neck: normal jugular venous A waves present, normal jugular venous V waves present and no jugular venous coleman A waves.   Pulmonary: no respiratory distress, normal respiratory rhythm and effort, no accessory muscle use and lungs were clear to auscultation bilaterally.   Cardiovascular: heart rate and rhythm were normal, normal S1 and S2 and no murmur, gallop, rub, heave or thrill are present.   Abdomen: soft, non-tender, no hepato-splenomegaly and no abdominal mass palpated.   Musculoskeletal: the gait could not be assessed..   Extremities: no clubbing of the fingernails, no localized cyanosis, no petechial hemorrhages and no ischemic changes.   Skin: normal skin color and pigmentation, no rash, no venous stasis, no skin lesions, no skin ulcer and no xanthoma was observed.   Psychiatric: oriented to person, place, and time, the affect was normal, the mood was normal and not feeling anxious.     LABS:   --------  02-16    139  |  109<H>  |  14  ----------------------------<  106<H>  4.3   |  26  |  0.80    Ca    9.0      16 Feb 2024 07:15  Phos  3.5     02-16  Mg     1.9     02-16    TPro  7.1  /  Alb  3.3  /  TBili  0.5  /  DBili  x   /  AST  21  /  ALT  24  /  AlkPhos  56  02-16                         11.6   4.87  )-----------( 289      ( 16 Feb 2024 07:15 )             37.2           136 mg/dL, --, 42 mg/dL<L>, 48 mg/dL        RADIOLOGY:  -----------------    < from: TTE Echo Complete w/o Contrast w/ Doppler (02.16.24 @ 12:31) >    Summary:   1. Left ventricular ejection fraction, by visual estimation, is 60 to   65%.   2. Normal left ventricular size and wall thicknesses, with normal   systolic and diastolic function.   3. Normal right ventricular size and function.   4. The left atrium is normal in size.   5. Structurally normal mitral valve, with normal leaflet excursion.   6. Mild-moderate tricuspid regurgitation.   7. Normal trileaflet aortic valve with normal opening.   8. Intravenousinjection of agitated saline demonstrates the presence of   a patent foramen ovale.      [id number] Atul Mcintyre MD, FACC, RPVI  Electronically signed on 2/16/2024 at 4:33:53 PM          < end of copied text >

## 2024-02-17 NOTE — DISCHARGE NOTE PROVIDER - NSDCCPCAREPLAN_GEN_ALL_CORE_FT
PRINCIPAL DISCHARGE DIAGNOSIS  Diagnosis: Weakness of left side of body  Assessment and Plan of Treatment:      PRINCIPAL DISCHARGE DIAGNOSIS  Diagnosis: Weakness of left side of body  Assessment and Plan of Treatment: bakari appointment with rheumatology, and neurology      SECONDARY DISCHARGE DIAGNOSES  Diagnosis: PFO (patent foramen ovale)  Assessment and Plan of Treatment: make appointment with cardiology    Diagnosis: Supraclinoid carotid artery aneurysm, small  Assessment and Plan of Treatment: make appointment with neurosurgery

## 2024-02-17 NOTE — DISCHARGE NOTE PROVIDER - NSFOLLOWUPCLINICS_GEN_ALL_ED_FT
[facility] Rheumatology  Rheumatology  [address] [facility] [address]  [place] [address]  Phone: [phone]  Fax:   Follow Up Time: 1 week    [facility] Specialty Clinics  Neurology  [address] [facility] - [address]rd [place] [address]  Phone: [phone]  Fax:   Follow Up Time: 1 week    Cardiology at [facility]  Cardiology  [address] [place] [address]  Phone: [phone]  Fax:   Follow Up Time: 1 week     Cardiology at [facility]  Cardiology  [address] [place] [address]  Phone: [phone]  Fax:   Follow Up Time: 1 week    [facility] Rheumatology  Rheumatology  [address] [place] [address] [address]  [place] [address]  Phone: [phone]  Fax:   Follow Up Time: 1 week    [facility] Specialty Clinics  [facility]rology  [address] [facility] [address] [place] [address]  Phone: [phone]  Fax:   Follow Up Time: 1 week    [facility] Neurosurgery  Neurosurgery  Referral Assistance Program  [place]   Phone:   Fax:   Follow Up Time: 2 weeks

## 2024-02-17 NOTE — CHART NOTE - NSCHARTNOTEFT_GEN_A_CORE
[facility]       To Whom It May Concern,     Champ BARRAZA was admitted on 02/15/24, treated and discharged on 02/17/24 from [facility].   If any further questions or concerns please call.     Thanks   Opal Parker DNP/ Dr. Amador  [phone]

## 2024-02-17 NOTE — DISCHARGE NOTE PROVIDER - NSFOLLOWUPCLINICSTOKEN_GEN_ALL_ED_FT
000437:1 week|| ||00\01||False;171632:1 week|| ||00\01||False;421649:1 week|| ||00\01||False; 944527:1 week|| ||00\01||False;751259:1 week|| ||00\01||False;484338:1 week|| ||00\01||False;368708:2 weeks|| ||00\01||False;

## 2024-02-18 LAB
CCP IGG SERPL-ACNC: <8 UNITS — SIGNIFICANT CHANGE UP
DSDNA AB SER-ACNC: <12 IU/ML — SIGNIFICANT CHANGE UP
RF+CCP IGG SER-IMP: NEGATIVE — SIGNIFICANT CHANGE UP

## 2024-02-19 LAB — ANA TITR SER: NEGATIVE — SIGNIFICANT CHANGE UP

## 2024-02-20 LAB
DNA PLOIDY SPEC FC-IMP: SIGNIFICANT CHANGE UP
PROT S FREE PPP-ACNC: 89 % — SIGNIFICANT CHANGE UP (ref 63–140)
PTR INTERPRETATION: SIGNIFICANT CHANGE UP

## 2024-02-22 ENCOUNTER — TRANSCRIPTION ENCOUNTER (OUTPATIENT)
Age: 39
End: 2024-02-22

## 2024-02-23 DIAGNOSIS — E66.3 OVERWEIGHT: ICD-10-CM

## 2024-02-23 DIAGNOSIS — I63.9 CEREBRAL INFARCTION, UNSPECIFIED: ICD-10-CM

## 2024-02-23 DIAGNOSIS — M25.552 PAIN IN LEFT HIP: ICD-10-CM

## 2024-02-23 DIAGNOSIS — G81.94 HEMIPLEGIA, UNSPECIFIED AFFECTING LEFT NONDOMINANT SIDE: ICD-10-CM

## 2024-02-23 DIAGNOSIS — R29.710 NIHSS SCORE 10: ICD-10-CM

## 2024-02-23 DIAGNOSIS — I67.1 CEREBRAL ANEURYSM, NONRUPTURED: ICD-10-CM

## 2024-02-23 DIAGNOSIS — M25.512 PAIN IN LEFT SHOULDER: ICD-10-CM

## 2024-02-23 DIAGNOSIS — R73.03 PREDIABETES: ICD-10-CM

## 2024-02-23 DIAGNOSIS — I10 ESSENTIAL (PRIMARY) HYPERTENSION: ICD-10-CM

## 2024-02-23 DIAGNOSIS — Q21.10 ATRIAL SEPTAL DEFECT, UNSPECIFIED: ICD-10-CM

## 2024-02-23 DIAGNOSIS — Q21.12 PATENT FORAMEN OVALE: ICD-10-CM

## 2024-02-26 NOTE — OB PROVIDER H&P - NS_ADMITDT_OBGYN_ALL_OB_DT
16-May-2019 17:25 [facility] [place] ambulatory encounter   GESTATIONAL DIABETES EDUCATION    CHIEF COMPLAINT:   Chief Complaint   Patient presents with    Diabetes Mellitus    Office Visit       PRIMARY CARE PROVIDER:  Pcp [facility], Unknown  Referring Provider: Justin Lundberg DO  SUBJECTIVE:  Kayley Harper is a 31 year old female who is here for Diabetes in pregnancy at 12 weeks of gestation  DESMOND: 9/4/2024   GA: 12w5d        PHYSICAL EXAM  VITAL SIGNS:  Visit Vitals  LMP 11/29/2023     There is no height or weight on file to calculate BMI.    WEIGHTS:   Wt Readings from Last 4 Encounters:   02/14/24 76.2 kg (167 lb 14.4 oz)   01/31/24 77.1 kg (170 lb)   01/24/24 76.2 kg (168 lb)   01/17/24 76.3 kg (168 lb 3.4 oz)     GENERAL:  Oriented x3. Appears well developed and well nourished. No acute distress.    PSYCHIATRIC: Normal mood and affect.      LAB RESULTS  Glucose (mg/dL)   Date Value   01/17/2024 86     Hemoglobin A1C (%)   Date Value   01/24/2024 9.0 (H)   01/09/2024 9.0   02/08/2023 9.4     No results found for: \"TSH\"    No results found for: \"VITD25\"     ASSESSMENT:  No diagnosis found.    PLAN:  Discussed in detail with family member and patient:  -  Blood sugar effect on fetal health  -  Blood sugar effect on maternal health  -  Blood sugar goals for pregnancy    We spent a great deal of time discussing:  - Basic diet including  carbohydrates  fruits  proteins.  -  Weight and exercise in pregnancy.  -  She is on insulin therapy now. Review the types of insulin, dosage and action. Currently on A.M. dose NPH 28 units with 12 units of Humalog. P.M dose:  16 units of Humalog with dinner.  Bedtime: NPH 18 units  -  Review Hypoglycemia and treatment. Rule of 15  -  Natural course of diabetes mellitus in pregnancy with increasing insulin resistance  -  Importance of followup    Recommendations:  -  Blood glucose meter education  -  Check blood sugars 4 times a day  -  Keep fasting blood sugars <95 as close to 90 as possible  -   Keep blood sugars 2 hours after meals <120   -  Introduction to [facility] Patient Portal  -  Email blood sugars weekly    Follow-up - No follow-ups on file.    I have spent 45 minutes of face to face time with this patient in which greater than 50% of the time was spent in counseling and coordination of care.

## 2024-03-13 ENCOUNTER — APPOINTMENT (OUTPATIENT)
Dept: NEUROSURGERY | Facility: CLINIC | Age: 39
End: 2024-03-13
Payer: COMMERCIAL

## 2024-03-13 ENCOUNTER — APPOINTMENT (OUTPATIENT)
Dept: CARDIOLOGY | Facility: CLINIC | Age: 39
End: 2024-03-13

## 2024-03-13 ENCOUNTER — NON-APPOINTMENT (OUTPATIENT)
Age: 39
End: 2024-03-13

## 2024-03-13 VITALS
BODY MASS INDEX: 27.82 KG/M2 | TEMPERATURE: 97.7 F | DIASTOLIC BLOOD PRESSURE: 76 MMHG | HEIGHT: 65 IN | OXYGEN SATURATION: 100 % | HEART RATE: 57 BPM | SYSTOLIC BLOOD PRESSURE: 112 MMHG | WEIGHT: 167 LBS

## 2024-03-13 PROCEDURE — 99203 OFFICE O/P NEW LOW 30 MIN: CPT

## 2024-03-13 NOTE — DATA REVIEWED
[de-identified] : CTA head and neck 2/15/24 [de-identified] : CT head non con 2/15/24 [de-identified] : MRI brain 2/16/24

## 2024-03-13 NOTE — ASSESSMENT
[FreeTextEntry1] : IMPRESSION: 38F with no significant PMH, p/w intermittent L sided hemiparesis, likely polyarthralgia. No strokes on MRI. s/p ILR placement. CTA with incidental 5mm right supraclinoid ICA aneurysm. No family history of aneurysms or smoking history.  Assured her presenting symptoms are not related to this incidental aneurysm finding. Counseled patient on the natural history of aneurysms, discussed risk of aneurysm rupture with the patient and signs and symptoms of subarachnoid hemorrhage, which is a life-threatening condition. Should she have severe, sudden onset worst headache of her life, advised that she must seek medical attention immediately and go to the emergency room if this occurs.  The risk of aneurysm rupture is related to the size and location. The size of this aneurysm is borderline which makes it reasonable to proceed with treatment given her young age. Treatment options comes in two forms including endovascular intervention with embolization or surgical clipping. Recommend diagnostic cerebral angiogram as the next step to evaluate the unruptured aneurysm. This procedure will obtain more information about the aneurysm and assess its size and morphology in possible preparation for treatment. The risks, benefits, alternatives, complications and personnel associated with the procedure were discussed with the patient in great detail. She requests that we proceed.    PLAN: Schedule Diagnostic Cerebral Angiogram Pre-surgical testing Follow up with neurology for further work-up of left sided symptoms

## 2024-03-13 NOTE — OB PROVIDER TRIAGE NOTE - LIVING CHILDREN, OB PROFILE
[de-identified] : 6/15/2021 2 yo child in the autistic spectrum, brought for evaluation of recurrent effortful vomiting. Child's symptoms began 2 months ago with an apparent viral illness characterized by diarrhea and vomiting. The diarrhea resolved spontaneously but vomiting continued. Stool C&S reportedly wnl. When vomiting persisted for a few weeks child was evaluated by Neurology for concern of possible CNS etiology, in part due to a PMH of in utero stroke. The evaluation is likely going to be negative although the mother awaits the results of a recent head MRI. The child has also had an abdominal CT that was negative, although family was told that the study did reveal mild inflammation. This study was done in late May, and mother reports that in the ensuing weeks that the child's vomiting has entirely subsided. His stool remains formed.   3/21/2023 Now 6 yo boy with new onset of apparent dysphagia for solids. Drinks without a problem but mother has noted a number of episodes where child does not appear to be able to swallow a solid. She has done a finger sweep in his mouth and reports this then clears his throat, She is unsure however if the problem if due to poor chewing and attempting to swallow large pieces of food. Child has a Hx of ADRYAN and recurrent vomiting for which she came to GI a few years ago, but those problems had appeared to subside. Mom also concerned about excessive gas and intermittent abdominal bloating. He reportedly passes solid stools in moderate amounts qd-qod.   3/13/2024 Child now 6 yo, s/p EGD that revealed Eoe last year. Child initiated Rx with esomeprazole that continues, and mother initially noted some improvement , but child now with frequent belching, abdominal distension and flatulence. In addition at times he appears to have some solid food dysphagia and occasional episodes of vomiting. Stools qd. Child in the autistic spectrum and also with severe allergies, so mother is worried about whether the esophageal inflammation could have "descended" to the stomach or some other complication. An older brother has had a problem with excessive foul flatulence for years the cause of which has so far has not been identified.  2

## 2024-03-13 NOTE — PHYSICAL EXAM
[Person] : oriented to person [Place] : oriented to place [Motor Tone] : muscle tone was normal in all four extremities [Time] : oriented to time [Abnormal Walk] : normal gait [Motor Strength] : muscle strength was normal in all four extremities

## 2024-03-21 ENCOUNTER — TRANSCRIPTION ENCOUNTER (OUTPATIENT)
Age: 39
End: 2024-03-21

## 2024-03-21 ENCOUNTER — OUTPATIENT (OUTPATIENT)
Dept: OUTPATIENT SERVICES | Facility: HOSPITAL | Age: 39
LOS: 1 days | End: 2024-03-21
Payer: COMMERCIAL

## 2024-03-21 ENCOUNTER — APPOINTMENT (OUTPATIENT)
Dept: NEUROSURGERY | Facility: HOSPITAL | Age: 39
End: 2024-03-21

## 2024-03-21 ENCOUNTER — RESULT REVIEW (OUTPATIENT)
Age: 39
End: 2024-03-21

## 2024-03-21 VITALS
RESPIRATION RATE: 22 BRPM | OXYGEN SATURATION: 100 % | SYSTOLIC BLOOD PRESSURE: 105 MMHG | HEART RATE: 65 BPM | DIASTOLIC BLOOD PRESSURE: 66 MMHG

## 2024-03-21 VITALS
HEART RATE: 56 BPM | OXYGEN SATURATION: 100 % | DIASTOLIC BLOOD PRESSURE: 71 MMHG | RESPIRATION RATE: 18 BRPM | TEMPERATURE: 99 F | SYSTOLIC BLOOD PRESSURE: 115 MMHG

## 2024-03-21 DIAGNOSIS — Z98.51 TUBAL LIGATION STATUS: Chronic | ICD-10-CM

## 2024-03-21 DIAGNOSIS — I67.1 CEREBRAL ANEURYSM, NONRUPTURED: ICD-10-CM

## 2024-03-21 DIAGNOSIS — Z33.2 ENCOUNTER FOR ELECTIVE TERMINATION OF PREGNANCY: Chronic | ICD-10-CM

## 2024-03-21 DIAGNOSIS — Z98.890 OTHER SPECIFIED POSTPROCEDURAL STATES: Chronic | ICD-10-CM

## 2024-03-21 PROCEDURE — C1769: CPT

## 2024-03-21 PROCEDURE — 36226 PLACE CATH VERTEBRAL ART: CPT | Mod: RT

## 2024-03-21 PROCEDURE — 36224 PLACE CATH CAROTD ART: CPT

## 2024-03-21 PROCEDURE — C1887: CPT

## 2024-03-21 PROCEDURE — 36226 PLACE CATH VERTEBRAL ART: CPT

## 2024-03-21 PROCEDURE — 36227 PLACE CATH XTRNL CAROTID: CPT | Mod: 50

## 2024-03-21 PROCEDURE — 76377 3D RENDER W/INTRP POSTPROCES: CPT | Mod: 26

## 2024-03-21 PROCEDURE — C1894: CPT

## 2024-03-21 PROCEDURE — 36224 PLACE CATH CAROTD ART: CPT | Mod: 50

## 2024-03-21 PROCEDURE — 36227 PLACE CATH XTRNL CAROTID: CPT

## 2024-03-21 RX ORDER — SODIUM CHLORIDE 9 MG/ML
1000 INJECTION INTRAMUSCULAR; INTRAVENOUS; SUBCUTANEOUS
Refills: 0 | Status: DISCONTINUED | OUTPATIENT
Start: 2024-03-21 | End: 2024-04-04

## 2024-03-21 RX ORDER — OXYCODONE HYDROCHLORIDE 5 MG/1
5 TABLET ORAL ONCE
Refills: 0 | Status: DISCONTINUED | OUTPATIENT
Start: 2024-03-21 | End: 2024-03-21

## 2024-03-21 RX ORDER — ACETAMINOPHEN 500 MG
1000 TABLET ORAL ONCE
Refills: 0 | Status: DISCONTINUED | OUTPATIENT
Start: 2024-03-21 | End: 2024-04-04

## 2024-03-21 RX ORDER — FENTANYL CITRATE 50 UG/ML
50 INJECTION INTRAVENOUS
Refills: 0 | Status: DISCONTINUED | OUTPATIENT
Start: 2024-03-21 | End: 2024-03-21

## 2024-03-21 RX ORDER — ONDANSETRON 8 MG/1
4 TABLET, FILM COATED ORAL ONCE
Refills: 0 | Status: DISCONTINUED | OUTPATIENT
Start: 2024-03-21 | End: 2024-04-04

## 2024-03-21 RX ORDER — OXYCODONE HYDROCHLORIDE 5 MG/1
10 TABLET ORAL ONCE
Refills: 0 | Status: DISCONTINUED | OUTPATIENT
Start: 2024-03-21 | End: 2024-03-21

## 2024-03-21 NOTE — ASU DISCHARGE PLAN (ADULT/PEDIATRIC) - CARE PROVIDER_API CALL
Gordon Roe  Neurosurgery  [address] [facility] [address] [facility] [address], [address]  [place] [address]  Phone: [phone]  Fax: [phone]  Follow Up Time:

## 2024-03-21 NOTE — ASU DISCHARGE PLAN (ADULT/PEDIATRIC) - ASU DC SPECIAL INSTRUCTIONSFT
If you take birth control pills they may not be effective for 14 days (2 weeks) from the procedure. So if you are sexually active and depend on birth control pills you must use another form of birth control (e.g. condoms) for 2 weeks after the procedure.    NO swimming or soaking in tub for 7 days. Do not use a hot tub or jacuzzi for 7 days.

## 2024-03-21 NOTE — ASU DISCHARGE PLAN (ADULT/PEDIATRIC) - NURSING INSTRUCTIONS
Please feel free to contact us at [phone] if any problems arise. After 6PM, Monday through Friday, on weekends and on holidays, please call [phone] and ask for the radiology resident on call to be paged.

## 2024-03-21 NOTE — CHART NOTE - NSCHARTNOTEFT_GEN_A_CORE
Interventional Neuro- Radiology   Procedure Note    Procedure: Selective Cerebral Angiography   Pre- Procedure Diagnosis:  Post- Procedure Diagnosis:    : Dr. Bhupinder MD  Fellow: MD Dr. Betsy Fitzgerald MD Dr. White, MD   Physician Assistant: Heber Encarnacion PA-C    RN:  Tech:    Anesthesia: (MAC)   (general anesthesia)    I/Os:  Fluids:  Hutchison:  Contrast:  Estimated Blood Loss: <10cc      Preliminary Report:  Under MAC/ general anesthesia, using a ___Fr short/long sheath to the right/ left/ bilateral femoral artery/ radial artery examination of left vertebral artery/ left internal carotid artery/ left external carotid artery/ right vertebral artery/ right internal carotid artery/ right external carotid artery via selective cerebral angiography demonstrates ________. ( Official note to follow).    Patient tolerated procedure well, vital signs stable, hemodynamically stable, no change in neurological status compared to baseline. Results discussed with patient and their family. Groin sheath d/c'ed, manual compression held to hemostasis, no active bleeding, no hematoma, Vascade/ celt applied, quick clot and safeguard balloon dressing applied at _____h.  Radial sheath d/c'ed, hemostasis maintained, no bleeding or hematoma, quickclot radial dressing placed at ___h. Patient transferred to PACU/ IR recovery/ NSCU for further care/ monitoring. 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 Gordon Roe  Fellow: Dr. Nkechi Meyer, Dr. Dwayne Mays  Physician Assistant: Heber Ecnarnacion PA-C    RN: Gordon Rooney  Tech: Chuck Lorenzo Amalia Bhoge    Anesthesia: (MAC) Dr. JENN Treadwell    I/Os:  Fluids: 200 cc  Hutchison: DTV  Contrast: 108 cc  Estimated Blood Loss: <10cc      Preliminary Report:  Under MAC, using a 5/4 Fr short sheath to the right radial artery examination of left vertebral artery/ left internal carotid artery/ left external carotid artery/ right internal carotid artery/ right external carotid artery via selective cerebral angiography demonstrates Right superior hypophyseal aneurysm. Official dictated report to follow.     Patient tolerated procedure well, vital signs stable, hemodynamically stable, no change in neurological status compared to baseline. Results discussed with patient. Radial sheath d/c'ed, hemostasis maintained, no bleeding or hematoma, quickclot radial dressing placed at 11:45 hours by Dr. Meyer. Small hematoma developed around the site of the right radial puncture after the dressing was applied. After further pressure was applied on the site by Dr. Marinelli this hematoma resolved. Patient transferred to IR recovery for further care/ monitoring.

## 2024-03-21 NOTE — ASU PREOP CHECKLIST - AS TEMP SITE
Order placed for COVID testing.     Pt is scheduled for 2/13/2021 at 12:15 at [address] [place] [address]. Aware she needs to quarantine after COVID test until sleep study. Aware of date, time, and location of appt and has no further questions or concerns.    oral

## 2024-03-21 NOTE — ASU DISCHARGE PLAN (ADULT/PEDIATRIC) - NS MD DC FALL RISK RISK
For information on Fall & Injury Prevention, visit: [email or web address] OR  [email or web address] OR  https://www.cdc.gov/steadi/patient.html

## 2024-03-21 NOTE — CHART NOTE - NSCHARTNOTEFT_GEN_A_CORE
38 years old female with no significant medical history present to ED with complain of left sided weakness. Patient reported that she noted limbing while walking at around yesterday 9PM ( 24) when she went to  her child. Patient went to bed and woke up at 3:5AM and unable to move left side at all. No recent trauma. Patient reported that she started to have migraine headache lately and was referred to see a neurologist but has not done so yet.   Hemodynamically stable, afebrile, sat well at RA. EKG with NSR. No leukocytosis, plt 337, K 4.2, lactate 1.4. Serum alcohol negative. CT head with no acute intracranial pathology. CTA with no hemodynamic significant stenosis. 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 CT C spine with no acute fracture  recent admission to hospital on 2/15/2024.     Interventional Neuro Radiology  Pre-Procedure Note    HPI:  38 year old female with no significant PMH who presented to [facility] on [facility]/15/24 with intermittent left sided weakness. Neurology consulted, work-up without evidence of strokes, likely polyarthralgia. Started on ASA 81. CTA showed incidental 5mm right supraclinoid ICA aneurysm. Denies known family history of cerebral aneurysms or aneurysm rupture. Not a smoker. 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       Neuro Exam: Awake and alert, oriented x3, fluent, normal naming and repetition, follows 3 step commands. Extraocular movements intact, no nystagmus, visual fields full, face symmetric, tongue midline. No drift, 5/5 power x 4 extremities. Normal sensation to LT. Normal finger-to-nose and rapid alternating movements.    PAST MEDICAL & SURGICAL HISTORY:  Pregnant and not yet delivered in third trimester       delivery delivered  10/25/2007 F 10lbs and 2011 M 9lbs      Termination of pregnancy (fetus)  D&C      H/O umbilical hernia repair      H/O tubal ligation          Social History:   Denies tobacco use    FAMILY HISTORY:  No pertinent family history in first degree relatives      No pertinent family history    Allergies: latex (Rash; Urticaria)  No Known Drug Allergies      Current Medications:     Labs:                 HCG:     Blood Bank:     Assessment/Plan:   This is a 38y ____ hand dominant Female  presents with ______. Patient presents to neuro-IR for selective cerebral angiography. Procedure/ risks/ benefits/ goals/ alternatives were explained. Risks include but are not limited to stroke/ vessel injury/ hemorrhage/ groin hematoma. All questions answered. Informed consent obtained from patient____. Consent placed in chart. Interventional Neuro Radiology  Pre-Procedure Note    HPI:  38 year old female with no significant PMH who presented to [facility] on [facility]/15/24 with intermittent left sided weakness. Neurology consulted, work-up without evidence of strokes, likely polyarthralgia. Started on ASA 81. CTA showed incidental 5mm right supraclinoid ICA aneurysm. Denies known family history of cerebral aneurysms or aneurysm rupture. Not a smoker. 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    Neuro Exam: Awake and alert, oriented x3, fluent, follows 3 step commands. Extraocular movements intact, no nystagmus, visual fields full, face symmetric, tongue midline. No drift, 5/5 power x 4 extremities.     PAST MEDICAL & SURGICAL HISTORY:  Pregnant and not yet delivered in third trimester   delivery delivered 10/25/2007 F 10lbs and 2011 M 9lbs  Termination of pregnancy (fetus) D&C  H/O umbilical hernia repair  H/O tubal ligation    Allergies: latex (Rash; Urticaria)  No Known Drug Allergies    Labs:   Complete Blood Count in AM (24 @ 07:15)    Nucleated RBC: 0 /100 WBCs    WBC Count: 4.87 K/uL    RBC Count: 4.55 M/uL    Hemoglobin: 11.6 g/dL    Hematocrit: 37.2 %    Mean Cell Volume: 81.8 fl    Mean Cell Hemoglobin: 25.5 pg    Mean Cell Hemoglobin Conc: 31.2 g/dL    Red Cell Distrib Width: 14.9 %    Platelet Count - Automated: 289 K/uL    Prothrombin Time and INR, Plasma (02.15.24 @ 06:28)    Prothrombin Time, Plasma: 11.1 sec    INR: 0.92:    Assessment/Plan:   This is a 38y Female  presents with incidental right supraclinoid ICA aneurysm. Patient presents to neuro-IR for selective cerebral angiography. Procedure/ risks/ benefits/ goals/ alternatives were explained. Risks include but are not limited to stroke/ vessel injury/ hemorrhage/ groin hematoma. All questions answered. Informed consent obtained from patient and placed in chart.

## 2024-04-04 ENCOUNTER — OUTPATIENT (OUTPATIENT)
Dept: OUTPATIENT SERVICES | Facility: HOSPITAL | Age: 39
LOS: 1 days | End: 2024-04-04
Payer: COMMERCIAL

## 2024-04-04 VITALS
TEMPERATURE: 98 F | WEIGHT: 171.08 LBS | SYSTOLIC BLOOD PRESSURE: 117 MMHG | DIASTOLIC BLOOD PRESSURE: 79 MMHG | OXYGEN SATURATION: 100 % | HEART RATE: 80 BPM | RESPIRATION RATE: 16 BRPM | HEIGHT: 65 IN

## 2024-04-04 DIAGNOSIS — Z91.040 LATEX ALLERGY STATUS: ICD-10-CM

## 2024-04-04 DIAGNOSIS — I67.1 CEREBRAL ANEURYSM, NONRUPTURED: ICD-10-CM

## 2024-04-04 DIAGNOSIS — Z98.890 OTHER SPECIFIED POSTPROCEDURAL STATES: Chronic | ICD-10-CM

## 2024-04-04 DIAGNOSIS — Z29.9 ENCOUNTER FOR PROPHYLACTIC MEASURES, UNSPECIFIED: ICD-10-CM

## 2024-04-04 DIAGNOSIS — Z33.2 ENCOUNTER FOR ELECTIVE TERMINATION OF PREGNANCY: Chronic | ICD-10-CM

## 2024-04-04 DIAGNOSIS — Z01.818 ENCOUNTER FOR OTHER PREPROCEDURAL EXAMINATION: ICD-10-CM

## 2024-04-04 DIAGNOSIS — Z98.51 TUBAL LIGATION STATUS: Chronic | ICD-10-CM

## 2024-04-04 LAB
ANION GAP SERPL CALC-SCNC: 11 MMOL/L — SIGNIFICANT CHANGE UP (ref 5–17)
BLD GP AB SCN SERPL QL: NEGATIVE — SIGNIFICANT CHANGE UP
BUN SERPL-MCNC: 11 MG/DL — SIGNIFICANT CHANGE UP (ref 7–23)
CALCIUM SERPL-MCNC: 9.4 MG/DL — SIGNIFICANT CHANGE UP (ref 8.4–10.5)
CHLORIDE SERPL-SCNC: 99 MMOL/L — SIGNIFICANT CHANGE UP (ref 96–108)
CO2 SERPL-SCNC: 25 MMOL/L — SIGNIFICANT CHANGE UP (ref 22–31)
CREAT SERPL-MCNC: 0.77 MG/DL — SIGNIFICANT CHANGE UP (ref 0.5–1.3)
EGFR: 100 ML/MIN/1.73M2 — SIGNIFICANT CHANGE UP
GLUCOSE SERPL-MCNC: 132 MG/DL — HIGH (ref 70–99)
HCT VFR BLD CALC: 38.3 % — SIGNIFICANT CHANGE UP (ref 34.5–45)
HGB BLD-MCNC: 12.1 G/DL — SIGNIFICANT CHANGE UP (ref 11.5–15.5)
MCHC RBC-ENTMCNC: 25 PG — LOW (ref 27–34)
MCHC RBC-ENTMCNC: 31.6 GM/DL — LOW (ref 32–36)
MCV RBC AUTO: 79.1 FL — LOW (ref 80–100)
NRBC # BLD: 0 /100 WBCS — SIGNIFICANT CHANGE UP (ref 0–0)
PA ADP PRP-ACNC: 6 PRU — LOW (ref 194–417)
PLATELET # BLD AUTO: 328 K/UL — SIGNIFICANT CHANGE UP (ref 150–400)
PLATELET RESPONSE ASPIRIN RESULT: 373 ARU — SIGNIFICANT CHANGE UP
POTASSIUM SERPL-MCNC: 3.7 MMOL/L — SIGNIFICANT CHANGE UP (ref 3.5–5.3)
POTASSIUM SERPL-SCNC: 3.7 MMOL/L — SIGNIFICANT CHANGE UP (ref 3.5–5.3)
RBC # BLD: 4.84 M/UL — SIGNIFICANT CHANGE UP (ref 3.8–5.2)
RBC # FLD: 14 % — SIGNIFICANT CHANGE UP (ref 10.3–14.5)
RH IG SCN BLD-IMP: POSITIVE — SIGNIFICANT CHANGE UP
SODIUM SERPL-SCNC: 135 MMOL/L — SIGNIFICANT CHANGE UP (ref 135–145)
WBC # BLD: 6.24 K/UL — SIGNIFICANT CHANGE UP (ref 3.8–10.5)
WBC # FLD AUTO: 6.24 K/UL — SIGNIFICANT CHANGE UP (ref 3.8–10.5)

## 2024-04-04 PROCEDURE — 80048 BASIC METABOLIC PNL TOTAL CA: CPT

## 2024-04-04 PROCEDURE — 85576 BLOOD PLATELET AGGREGATION: CPT

## 2024-04-04 PROCEDURE — 86901 BLOOD TYPING SEROLOGIC RH(D): CPT

## 2024-04-04 PROCEDURE — 85027 COMPLETE CBC AUTOMATED: CPT

## 2024-04-04 PROCEDURE — 86850 RBC ANTIBODY SCREEN: CPT

## 2024-04-04 PROCEDURE — 86900 BLOOD TYPING SEROLOGIC ABO: CPT

## 2024-04-04 PROCEDURE — G0463: CPT

## 2024-04-04 NOTE — H&P PST ADULT - NSICDXPASTSURGICALHX_GEN_ALL_CORE_FT
PAST SURGICAL HISTORY:   delivery delivered 10/25/2007 F 10lbs and 2011 M 9lbs    H/O tubal ligation     H/O umbilical hernia repair     History of cerebral angiography     Termination of pregnancy (fetus) D&C

## 2024-04-04 NOTE — H&P PST ADULT - HISTORY OF PRESENT ILLNESS
38 year old female with PMH latex allergy and migraines presented to [facility] ED on 2/15/24 with c/o intermittent left sided weakness. Neurology was consulted and her work-up was without evidence of strokes, likely polyarthralgia. She was started on ASA 81mg. CTA showed an incidental 5mm right supraclinoid ICA aneurysm. She denies known family history of cerebral aneurysms or aneurysm rupture. Pt now presents to [facility] for scheduled cerebral angiogram and embolization with Dr. Roe on 4/11/24 at the interventional radiology suite.    **Of note, when pt was hospitalized she states that her blood pressure "kept dropping" s/p echocardiogram revealed a patent foramen ovale. Pt denies any chest pain, dizziness or syncopal episodes. She was advised to F/U with cardiology outpatient. Pt F/U with [facility] in 2/2024 and is currently wearing a cardiac monitoring device that she initiated on 3/25/24. Her next visit with her cardiologist will be 30 days from wearing the device.**

## 2024-04-04 NOTE — H&P PST ADULT - NSICDXPASTMEDICALHX_GEN_ALL_CORE_FT
PAST MEDICAL HISTORY:  HLD (hyperlipidemia)     Latex allergy     Migraines     Nonruptured cerebral aneurysm     PFO (patent foramen ovale)

## 2024-04-04 NOTE — H&P PST ADULT - OTHER CARE PROVIDERS
[facility] [phone] - 2/2024 for initial evaluation; Dr. Renato Archuleta (Neuro) [phone] - last visit 2/22/24 for F/U

## 2024-04-11 ENCOUNTER — INPATIENT (INPATIENT)
Facility: HOSPITAL | Age: 39
LOS: 0 days | Discharge: HOME | DRG: 26 | End: 2024-04-12
Attending: NEUROLOGICAL SURGERY | Admitting: NEUROLOGICAL SURGERY
Payer: COMMERCIAL

## 2024-04-11 ENCOUNTER — APPOINTMENT (OUTPATIENT)
Dept: NEUROSURGERY | Facility: HOSPITAL | Age: 39
End: 2024-04-11

## 2024-04-11 VITALS
WEIGHT: 166.89 LBS | TEMPERATURE: 98 F | SYSTOLIC BLOOD PRESSURE: 119 MMHG | DIASTOLIC BLOOD PRESSURE: 81 MMHG | HEIGHT: 65 IN | OXYGEN SATURATION: 100 % | HEART RATE: 66 BPM | RESPIRATION RATE: 18 BRPM

## 2024-04-11 DIAGNOSIS — Z98.890 OTHER SPECIFIED POSTPROCEDURAL STATES: Chronic | ICD-10-CM

## 2024-04-11 DIAGNOSIS — Z98.51 TUBAL LIGATION STATUS: Chronic | ICD-10-CM

## 2024-04-11 DIAGNOSIS — I67.1 CEREBRAL ANEURYSM, NONRUPTURED: ICD-10-CM

## 2024-04-11 DIAGNOSIS — Z33.2 ENCOUNTER FOR ELECTIVE TERMINATION OF PREGNANCY: Chronic | ICD-10-CM

## 2024-04-11 LAB
ANION GAP SERPL CALC-SCNC: 9 MMOL/L — SIGNIFICANT CHANGE UP (ref 5–17)
BUN SERPL-MCNC: 12 MG/DL — SIGNIFICANT CHANGE UP (ref 7–23)
CALCIUM SERPL-MCNC: 8.4 MG/DL — SIGNIFICANT CHANGE UP (ref 8.4–10.5)
CHLORIDE SERPL-SCNC: 108 MMOL/L — SIGNIFICANT CHANGE UP (ref 96–108)
CO2 SERPL-SCNC: 20 MMOL/L — LOW (ref 22–31)
CREAT SERPL-MCNC: 0.79 MG/DL — SIGNIFICANT CHANGE UP (ref 0.5–1.3)
EGFR: 97 ML/MIN/1.73M2 — SIGNIFICANT CHANGE UP
GLUCOSE BLDC GLUCOMTR-MCNC: 221 MG/DL — HIGH (ref 70–99)
GLUCOSE SERPL-MCNC: 104 MG/DL — HIGH (ref 70–99)
HCT VFR BLD CALC: 32.9 % — LOW (ref 34.5–45)
HGB BLD-MCNC: 10.6 G/DL — LOW (ref 11.5–15.5)
MAGNESIUM SERPL-MCNC: 1.7 MG/DL — SIGNIFICANT CHANGE UP (ref 1.6–2.6)
MCHC RBC-ENTMCNC: 25.6 PG — LOW (ref 27–34)
MCHC RBC-ENTMCNC: 32.2 GM/DL — SIGNIFICANT CHANGE UP (ref 32–36)
MCV RBC AUTO: 79.5 FL — LOW (ref 80–100)
NRBC # BLD: 0 /100 WBCS — SIGNIFICANT CHANGE UP (ref 0–0)
PA ADP PRP-ACNC: 3 PRU — LOW (ref 194–417)
PHOSPHATE SERPL-MCNC: 3.4 MG/DL — SIGNIFICANT CHANGE UP (ref 2.5–4.5)
PLATELET # BLD AUTO: 269 K/UL — SIGNIFICANT CHANGE UP (ref 150–400)
PLATELET RESPONSE ASPIRIN RESULT: 541 ARU — SIGNIFICANT CHANGE UP
POTASSIUM SERPL-MCNC: 4 MMOL/L — SIGNIFICANT CHANGE UP (ref 3.5–5.3)
POTASSIUM SERPL-SCNC: 4 MMOL/L — SIGNIFICANT CHANGE UP (ref 3.5–5.3)
RBC # BLD: 4.14 M/UL — SIGNIFICANT CHANGE UP (ref 3.8–5.2)
RBC # FLD: 14.2 % — SIGNIFICANT CHANGE UP (ref 10.3–14.5)
SODIUM SERPL-SCNC: 137 MMOL/L — SIGNIFICANT CHANGE UP (ref 135–145)
WBC # BLD: 6.84 K/UL — SIGNIFICANT CHANGE UP (ref 3.8–10.5)
WBC # FLD AUTO: 6.84 K/UL — SIGNIFICANT CHANGE UP (ref 3.8–10.5)

## 2024-04-11 PROCEDURE — 36226 PLACE CATH VERTEBRAL ART: CPT | Mod: RT

## 2024-04-11 PROCEDURE — 99291 CRITICAL CARE FIRST HOUR: CPT

## 2024-04-11 PROCEDURE — 36228 PLACE CATH INTRACRANIAL ART: CPT | Mod: RT

## 2024-04-11 PROCEDURE — 75894 X-RAYS TRANSCATH THERAPY: CPT | Mod: 26

## 2024-04-11 PROCEDURE — 61624 TCAT PERM OCCLS/EMBOLJ CNS: CPT

## 2024-04-11 PROCEDURE — 70496 CT ANGIOGRAPHY HEAD: CPT | Mod: 26

## 2024-04-11 PROCEDURE — 36224 PLACE CATH CAROTD ART: CPT | Mod: RT

## 2024-04-11 PROCEDURE — 75898 FOLLOW-UP ANGIOGRAPHY: CPT | Mod: 26

## 2024-04-11 RX ORDER — INSULIN LISPRO 100/ML
VIAL (ML) SUBCUTANEOUS
Refills: 0 | Status: DISCONTINUED | OUTPATIENT
Start: 2024-04-11 | End: 2024-04-12

## 2024-04-11 RX ORDER — ACETAMINOPHEN 500 MG
650 TABLET ORAL EVERY 6 HOURS
Refills: 0 | Status: DISCONTINUED | OUTPATIENT
Start: 2024-04-11 | End: 2024-04-12

## 2024-04-11 RX ORDER — CHLORHEXIDINE GLUCONATE 213 G/1000ML
1 SOLUTION TOPICAL AT BEDTIME
Refills: 0 | Status: DISCONTINUED | OUTPATIENT
Start: 2024-04-11 | End: 2024-04-12

## 2024-04-11 RX ORDER — ATORVASTATIN CALCIUM 80 MG/1
40 TABLET, FILM COATED ORAL AT BEDTIME
Refills: 0 | Status: DISCONTINUED | OUTPATIENT
Start: 2024-04-11 | End: 2024-04-12

## 2024-04-11 RX ORDER — ONDANSETRON 8 MG/1
4 TABLET, FILM COATED ORAL EVERY 6 HOURS
Refills: 0 | Status: DISCONTINUED | OUTPATIENT
Start: 2024-04-11 | End: 2024-04-12

## 2024-04-11 RX ORDER — TICAGRELOR 90 MG/1
1 TABLET ORAL
Refills: 0 | DISCHARGE

## 2024-04-11 RX ORDER — ACETAMINOPHEN 500 MG
1000 TABLET ORAL ONCE
Refills: 0 | Status: COMPLETED | OUTPATIENT
Start: 2024-04-11 | End: 2024-04-11

## 2024-04-11 RX ORDER — MAGNESIUM SULFATE 500 MG/ML
2 VIAL (ML) INJECTION ONCE
Refills: 0 | Status: COMPLETED | OUTPATIENT
Start: 2024-04-11 | End: 2024-04-11

## 2024-04-11 RX ORDER — INFLUENZA VIRUS VACCINE 15; 15; 15; 15 UG/.5ML; UG/.5ML; UG/.5ML; UG/.5ML
0.5 SUSPENSION INTRAMUSCULAR ONCE
Refills: 0 | Status: DISCONTINUED | OUTPATIENT
Start: 2024-04-11 | End: 2024-04-11

## 2024-04-11 RX ORDER — ASPIRIN/CALCIUM CARB/MAGNESIUM 324 MG
81 TABLET ORAL
Refills: 0 | Status: DISCONTINUED | OUTPATIENT
Start: 2024-04-12 | End: 2024-04-12

## 2024-04-11 RX ORDER — POLYETHYLENE GLYCOL 3350 17 G/17G
17 POWDER, FOR SOLUTION ORAL AT BEDTIME
Refills: 0 | Status: DISCONTINUED | OUTPATIENT
Start: 2024-04-11 | End: 2024-04-12

## 2024-04-11 RX ORDER — SODIUM CHLORIDE 9 MG/ML
500 INJECTION INTRAMUSCULAR; INTRAVENOUS; SUBCUTANEOUS ONCE
Refills: 0 | Status: COMPLETED | OUTPATIENT
Start: 2024-04-11 | End: 2024-04-11

## 2024-04-11 RX ORDER — ONDANSETRON 8 MG/1
4 TABLET, FILM COATED ORAL ONCE
Refills: 0 | Status: COMPLETED | OUTPATIENT
Start: 2024-04-11 | End: 2024-04-11

## 2024-04-11 RX ORDER — SODIUM CHLORIDE 9 MG/ML
1000 INJECTION INTRAMUSCULAR; INTRAVENOUS; SUBCUTANEOUS
Refills: 0 | Status: DISCONTINUED | OUTPATIENT
Start: 2024-04-11 | End: 2024-04-11

## 2024-04-11 RX ORDER — SENNA PLUS 8.6 MG/1
2 TABLET ORAL AT BEDTIME
Refills: 0 | Status: DISCONTINUED | OUTPATIENT
Start: 2024-04-11 | End: 2024-04-12

## 2024-04-11 RX ORDER — TICAGRELOR 90 MG/1
90 TABLET ORAL
Refills: 0 | Status: DISCONTINUED | OUTPATIENT
Start: 2024-04-11 | End: 2024-04-12

## 2024-04-11 RX ADMIN — TICAGRELOR 90 MILLIGRAM(S): 90 TABLET ORAL at 17:17

## 2024-04-11 RX ADMIN — SODIUM CHLORIDE 500 MILLILITER(S): 9 INJECTION INTRAMUSCULAR; INTRAVENOUS; SUBCUTANEOUS at 13:00

## 2024-04-11 RX ADMIN — CHLORHEXIDINE GLUCONATE 1 APPLICATION(S): 213 SOLUTION TOPICAL at 21:30

## 2024-04-11 RX ADMIN — Medication 400 MILLIGRAM(S): at 20:10

## 2024-04-11 RX ADMIN — Medication 25 GRAM(S): at 15:18

## 2024-04-11 RX ADMIN — Medication 1 DROP(S): at 23:22

## 2024-04-11 RX ADMIN — Medication 4: at 21:29

## 2024-04-11 RX ADMIN — Medication 1000 MILLIGRAM(S): at 20:40

## 2024-04-11 RX ADMIN — Medication 400 MILLIGRAM(S): at 13:48

## 2024-04-11 RX ADMIN — Medication 1000 MILLIGRAM(S): at 14:10

## 2024-04-11 RX ADMIN — ATORVASTATIN CALCIUM 40 MILLIGRAM(S): 80 TABLET, FILM COATED ORAL at 21:29

## 2024-04-11 RX ADMIN — ONDANSETRON 4 MILLIGRAM(S): 8 TABLET, FILM COATED ORAL at 12:45

## 2024-04-11 RX ADMIN — Medication 1 DROP(S): at 17:18

## 2024-04-11 RX ADMIN — SODIUM CHLORIDE 70 MILLILITER(S): 9 INJECTION INTRAMUSCULAR; INTRAVENOUS; SUBCUTANEOUS at 13:53

## 2024-04-11 NOTE — CHART NOTE - NSCHARTNOTEFT_GEN_A_CORE
Interventional Neuro- Radiology   Procedure Note PA-C    Procedure: Catheter Cerebral Angiogram and embolization of right superior hypophyseal artery aneurysm   Pre- Procedure Diagnosis: Bilateral superior hypophyseal artery aneurysm   Post- Procedure Diagnosis: Endovascular treatment of a right superior hypophyseal artery aneurysm     : Dr Gordon Roe   Fellow:   Physician Assistant: Yisel Fountain PA-C    Nurse:  Radiologic Tech:  Anesthesiologist:  Sheath:  Sheath:  Barbeau:      I/Os: EBL less than 10cc  IV fluids:     cc  Urine output     cc    Contrast Visi             Vitals: BP         HR      Spo2 100%          Preliminary Report:  Using a 5 Guyanese short sheath to the right groin under MAC sedation via left vertebral artery, left internal carotid artery, left external carotid artery, right vertebral artery, right internal carotid artery, right external carotid artery a selective cerebral angiography was performed and demonstrated                       Official note to follow.  Patient tolerated procedure well, hemodynamically stable, no change in neurological status compared to baseline. Results discussed with neuro ICU team, patient and patient's . Right groin sheath was removed, manual compression held to hemostasis for 20 minutes, no active bleeding, no hematoma, quick clot and safeguard balloon dressing applied at Interventional Neuro- Radiology   Procedure Note PA-C    Procedure: Catheter Cerebral Angiogram and embolization of right superior hypophyseal artery aneurysm   Pre- Procedure Diagnosis: Bilateral superior hypophyseal artery aneurysm   Post- Procedure Diagnosis: Endovascular treatment of a right superior hypophyseal artery aneurysm     : Dr Gordon Roe   Fellow:     Dr Oziel Marinelli   Physician Assistant: Yisel Fountain PA-C    Nurse:                   Eddie Rose RN  Radiologic Tech:  Anesthesiologist:  Sheath:  Sheath:  Barbeau:      I/Os: EBL less than 10cc  IV fluids:     cc  Urine output     cc    Contrast Visi             Vitals: BP         HR      Spo2 100%          Preliminary Report:  Using a 5 Malay short sheath to the right radial under MAC sedation via left vertebral artery, left internal carotid artery, left external carotid artery, right vertebral artery, right internal carotid artery, right external carotid artery a selective cerebral angiography was performed and demonstrated                       Official note to follow.  Patient tolerated procedure well, hemodynamically stable, no change in neurological status compared to baseline. Results discussed with neuro ICU team, patient and patient's . Right groin sheath was removed, manual compression held to hemostasis for 20 minutes, no active bleeding, no hematoma, quick clot and safeguard balloon dressing applied at Interventional Neuro- Radiology   Procedure Note PA-C    Procedure: Catheter Cerebral Angiogram and embolization of right superior hypophyseal artery aneurysm with a KAILASH X flow diverter   Pre- Procedure Diagnosis: Bilateral superior hypophyseal artery aneurysm   Post- Procedure Diagnosis: Endovascular treatment of a right superior hypophyseal artery aneurysm     : Dr Gordon Roe   Fellow:     Dr Oziel Marinelli   Physician Assistant: EVER Smallwood-C    Nurse:                   Eddie Rose RN  Radiologic Tech:   Willie Gee LRT, Shavon Morales LRT   Anesthesiologist:   Dr Alston   Sheath:                 7 Ukrainian Rist radial access 95cm           Barbeau:               A      I/Os: EBL less than 10cc  IV fluids: 1,ooocc  Urine output 300cc    Contrast Visi 117cc        Heparin 1,ooo units  Heparin 35oo units          Kailash X flow diverter 4.0mm by 16mm by 12mm      Vitals: BP 93/51        HR 92      Spo2 100%          Preliminary Report:  Using a 7 Ukrainian Rist 95cm sheath to the right radial under general anesthesia via right vertebral artery and right internal carotid artery a selective cerebral angiography and KAILASH X stent was performed. Official note to follow.  Patient tolerated procedure well, hemodynamically stable, no change in neurological status compared to baseline. Results discussed with neuro ICU team, patient and patient's . Right radial access sheath was removed, manual compression held to hemostasis for 15 minutes, no active bleeding, no hematoma, quick clot and band applied at 12:15pm. Interventional Neuro- Radiology   Procedure Note PA-C    Procedure: Catheter Cerebral Angiogram and embolization of right superior hypophyseal artery aneurysm with a KAILASH X flow diverter   Pre- Procedure Diagnosis: Bilateral superior hypophyseal artery aneurysm   Post- Procedure Diagnosis: Endovascular treatment of a right superior hypophyseal artery aneurysm     : Dr Gordon Roe   Fellow:     Dr Oziel Marinelli   Physician Assistant: Yisel Fountain PA-C    Nurse:                   Eddie Rose RN  Radiologic Tech:   Willie Gee LRT, Shavon Morales LRT   Anesthesiologist:   Dr Kameron Batista   Sheath:                 7 Belarusian Rist radial access 95cm           Barbeau:               A    I/Os: EBL less than 10cc  IV fluids: 1,ooocc  Urine output 300cc    Contrast Visi 117cc        Heparin 1,ooo units  Heparin 35oo units          Kailash X flow diverter 4.0mm by 16mm by 12mm      Vitals: BP 93/51        HR 92      Spo2 100%          Preliminary Report:  Using a 7 Belarusian Rist 95cm sheath to the right radial under general anesthesia via right vertebral artery and right internal carotid artery a selective cerebral angiography and KAILASH X stent was performed, for treatment of right superior hypophyseal artery aneurysm. Official note to follow.  Patient tolerated procedure well, hemodynamically stable, no change in neurological status compared to baseline. Results discussed with neuro ICU team, patient and patient's . Right radial access sheath was removed, manual compression held to hemostasis for 15 minutes, no active bleeding, no hematoma, quick clot and band applied at 12:15pm. PRU/P2y12 tomorrow at 7am.

## 2024-04-11 NOTE — CHART NOTE - NSCHARTNOTEFT_GEN_A_CORE
CAPRINI SCORE [CLOT] Score on Admission for     AGE RELATED RISK FACTORS                                                       MOBILITY RELATED FACTORS  [ ] Age 41-60 years                                            (1 Point)                  [ ] Bed rest                                                        (1 Point)  [ ] Age: 61-74 years                                           (2 Points)                 [ ] Plaster cast                                                   (2 Points)  [ ] Age= 75 years                                              (3 Points)                 [ ] Bed bound for more than 72 hours                 (2 Points)    DISEASE RELATED RISK FACTORS                                               GENDER SPECIFIC FACTORS  [ ] Edema in the lower extremities                       (1 Point)                  [ ] Pregnancy                                                     (1 Point)  [ ] Varicose veins                                               (1 Point)                  [ ] Post-partum < 6 weeks                                   (1 Point)             [ x] BMI > 25 Kg/m2                                            (1 Point)                  [ ] Hormonal therapy  or oral contraception          (1 Point)                 [ ] Sepsis (in the previous month)                        (1 Point)                  [ ] History of pregnancy complications                 (1 point)  [ ] Pneumonia or serious lung disease                                               [ ] Unexplained or recurrent                     (1 Point)           (in the previous month)                               (1 Point)  [ ] Abnormal pulmonary function test                     (1 Point)                 SURGERY RELATED RISK FACTORS (include planned surgeries)  [ ] Acute myocardial infarction                              (1 Point)                 [ ]  Section                                             (1 Point)  [ ] Congestive heart failure (in the previous month)  (1 Point)         [ ] Minor surgery                                                  (1 Point)   [ ] Inflammatory bowel disease                             (1 Point)                 [ ] Arthroscopic surgery                                        (2 Points)  [ ] Central venous access                                      (2 Points)                [ x] General surgery lasting more than 45 minutes   (2 Points)       [ ] Stroke (in the previous month)                          (5 Points)               [ ] Elective arthroplasty                                         (5 Points)            [ ] current or past malignancy                              (2 Points)                                                                                                       HEMATOLOGY RELATED FACTORS                                                 TRAUMA RELATED RISK FACTORS  [ ] Prior episodes of VTE                                     (3 Points)                [ ] Fracture of the hip, pelvis, or leg                       (5 Points)  [ ] Positive family history for VTE                         (3 Points)                 [ ] Acute spinal cord injury (in the previous month)  (5 Points)  [ ] Prothrombin 81269 A                                     (3 Points)                 [ ] Paralysis  (less than 1 month)                             (5 Points)  [ ] Factor V Leiden                                             (3 Points)                  [ ] Multiple Trauma within 1 month                        (5 Points)  [ ] Lupus anticoagulants                                     (3 Points)                                                           [ ] Anticardiolipin antibodies                               (3 Points)                                                       [ ] High homocysteine in the blood                      (3 Points)                                             [ ] Other congenital or acquired thrombophilia      (3 Points)                                                [ ] Heparin induced thrombocytopenia                  (3 Points)                                          Total Score [      3    ]    Risk:  Very low 0   Low 1 to 2   Moderate 3 to 4   High =5       VTE Prophylasix Recommednations:  [x ] mechanical pneumatic compression devices                                      [ ] contraindicated: _____________________  [ ] chemo prophylasix                                                                                   [ x] contraindicated _____________________    **** HIGH LIKELIHOOD DVT PRESENT ON ADMISSION  [ ] (please order LE dopplers within 24 hours of admission)

## 2024-04-11 NOTE — PROGRESS NOTE ADULT - ASSESSMENT
ASSESSMENT/PLAN: s/p R ICA superior hypophyseal aneurysm embolization.    NEURO:  - C/W ASA 81 _ Brilinta 90 BID  - ARU/PRU in the AM  PT OT    PULM:  RA  sat > 92%    CV:  SBP goal:     RENAL:  Fluids: NS 70 ccs/hour    GI:  Diet: NPO pending dysphagia  Bowel regimen     ENDO:   Goal euglycemia (-180)    HEME/ONC:  SCD  held chemoppx    ID: afebrile    full code    dispo icu ASSESSMENT/PLAN: s/p R ICA superior hypophyseal aneurysm embolization.    NEURO:  - C/W ASA 81 _ Brilinta 90 BID  - ARU/PRU in the AM  PT OT    CV:  SBP goal:     PULM:  RA  sat > 92%    RENAL:  IVL   remove marie     GI:  Diet: CCD  Bowel regimen     ENDO:   Goal euglycemia (-180)  ISS     HEME/ONC:  SCD  held chemoppx    ID: afebrile    full code    dispo to floor tomorrow  ASSESSMENT/PLAN: s/p R ICA superior hypophyseal aneurysm embolization.    NEURO:  - C/W ASA 81 _ Brilinta 90 BID  - ARU/PRU in the AM  PT OT    CV:  SBP goal:     PULM:  RA  sat > 92%    RENAL:  IVL   remove marie     GI:  Diet: CCD  Bowel regimen     ENDO:   Goal euglycemia (-180)  ISS     HEME/ONC:  SCD  held chemoppx    ID: afebrile    full code    dispo to home tomorrow

## 2024-04-11 NOTE — PROGRESS NOTE ADULT - ASSESSMENT
ASSESSMENT/PLAN: s/p R ICA superior hypophyseal aneurysm embolization.    NEURO:  - Neurochecks Q 1, vitals Q1  - MRI/NOVA am  - C/W ASA 81 _ Brilinta 90 BID  - ARU/PRU in the AM  Activity: [] OOB as tolerated [] Bedrest [] PT [] OT [] PMNR    PULM:  RA  sat > 92%    CV:  SBP goal:     RENAL:  Fluids: NS 70 ccs/hour    GI:  Diet: NPO pending dysphagia  GI prophylaxis [] not indicated [] PPI [] other:  Bowel regimen [] colace [] senna [] other:    ENDO:   Goal euglycemia (-180)    HEME/ONC:  VTE prophylaxis: [x] SCDs [] chemoprophylaxis [] high risk of DVT/PE on admission due to:    ID: afebrile    MISC:    SOCIAL/FAMILY:  [] updated at bedside [] family meeting    CODE STATUS:  [] Full Code [] DNR [] DNI [] Palliative/Comfort Care    DISPOSITION:  [] ICU [] Stroke Unit [] Floor [] EMU [] RCU [] PCU    [] Patient is at high risk of neurologic deterioration/death due to:     Time seen:  Time spent: ___ [] critical care minutes

## 2024-04-11 NOTE — CHART NOTE - NSCHARTNOTEFT_GEN_A_CORE
Interventional Neuro Radiology  Pre-Procedure Note PA-C      This is a 38 year old left hand dominant female with PMH latex allergy and migraines presented to [facility] ED on 2/15/24 with c/o intermittent left sided weakness. Neurology was consulted and her work-up was without evidence of strokes, likely polyarthralgia. She was started on ASA 81mg. CTA showed an incidental 5mm right supraclinoid ICA aneurysm. She denies known family history of cerebral aneurysms or aneurysm rupture. Patient now presents to Neuro IR for a catheter cerebral angiogram and embolization of right superior hypophyseal artery aneurysm.     Upon exam patient is A + O x 3, speech is fluent, move all extremities, ambulates without assist        Allergies: No Known Drug Allergies  latex (Rash; Urticaria)  PMHX: Latex allergy, Nonruptured cerebral aneurysms, Hyperlipidemia, Migraines, PFO  PSHX;  delivery delivered, D&C, umbilical hernia repair, tubal ligation, diagnostic cerebral angiography  Social History:   FAMILY HISTORY:  Current Medications:   Labs:                   Blood Bank:       Assessment/Plan:   This is a 38 year old left hand dominant female with bilateral superior hypophyseal artery aneurysms, right greater than left, who presents to Neuro-IR for selective cerebral angiography and endovascular treatment of right aneurysm. Procedure, goals, risks, benefits and alternatives were discussed with patient and patient's . All questions were answered. Risks include but are not limited to stroke, vessel injury, hemorrhage, and or right groin hematoma. Patient demonstrates understanding of all risks involved with this procedure and wishes to continue. Appropriate consent was obtained from patient and consent is in the patient's chart. Interventional Neuro Radiology  Pre-Procedure Note PA-C      This is a 38 year old left hand dominant female with PMH latex allergy and migraines presented to [facility] ED on 2/15/24 with c/o intermittent left sided weakness. Neurology was consulted and her work-up was without evidence of strokes, likely polyarthralgia. She was started on ASA 81mg. CT Angio revealed an incidental 5mm right supraclinoid ICA aneurysm. Patient now presents to Neuro IR for a catheter cerebral angiogram and embolization of right superior hypophyseal artery aneurysm.     Upon exam patient is A + O x 3, speech is fluent, move all extremities, ambulates without assist.       Allergies: No Known Drug Allergies  latex (Rash; Urticaria)  PMHX: Latex allergy, Nonruptured cerebral aneurysms, Hyperlipidemia, Migraines, PFO  PSHX;  delivery delivered, D&C, umbilical hernia repair, tubal ligation, diagnostic cerebral angiography  Social History:   FAMILY HISTORY:  Current Medications:     PRU     6            12.1  6.24  38.3   328    135  99   11   3.7  25   0.77  132    Blood Bank: A positive available       Assessment/Plan:   This is a 38 year old left hand dominant female with bilateral superior hypophyseal artery aneurysms, right greater than left, who presents to Neuro-IR for selective cerebral angiography and endovascular treatment of right aneurysm. Procedure, goals, risks, benefits and alternatives were discussed with patient and patient's . All questions were answered. Risks include but are not limited to stroke, vessel injury, hemorrhage, and or right groin hematoma. Patient demonstrates understanding of all risks involved with this procedure and wishes to continue. Appropriate consent was obtained from patient and consent is in the patient's chart.

## 2024-04-12 ENCOUNTER — TRANSCRIPTION ENCOUNTER (OUTPATIENT)
Age: 39
End: 2024-04-12

## 2024-04-12 VITALS — TEMPERATURE: 98 F | OXYGEN SATURATION: 99 % | HEART RATE: 59 BPM | RESPIRATION RATE: 22 BRPM

## 2024-04-12 LAB
GLUCOSE BLDC GLUCOMTR-MCNC: 121 MG/DL — HIGH (ref 70–99)
GLUCOSE BLDC GLUCOMTR-MCNC: 149 MG/DL — HIGH (ref 70–99)
PA ADP PRP-ACNC: 80 PRU — LOW (ref 194–417)
PLATELET RESPONSE ASPIRIN RESULT: 500 ARU — SIGNIFICANT CHANGE UP

## 2024-04-12 PROCEDURE — 36224 PLACE CATH CAROTD ART: CPT

## 2024-04-12 PROCEDURE — 75898 FOLLOW-UP ANGIOGRAPHY: CPT

## 2024-04-12 PROCEDURE — 36415 COLL VENOUS BLD VENIPUNCTURE: CPT

## 2024-04-12 PROCEDURE — C1894: CPT

## 2024-04-12 PROCEDURE — 70551 MRI BRAIN STEM W/O DYE: CPT | Mod: 26

## 2024-04-12 PROCEDURE — 36228 PLACE CATH INTRACRANIAL ART: CPT

## 2024-04-12 PROCEDURE — 83735 ASSAY OF MAGNESIUM: CPT

## 2024-04-12 PROCEDURE — 70544 MR ANGIOGRAPHY HEAD W/O DYE: CPT

## 2024-04-12 PROCEDURE — 84100 ASSAY OF PHOSPHORUS: CPT

## 2024-04-12 PROCEDURE — 61624 TCAT PERM OCCLS/EMBOLJ CNS: CPT

## 2024-04-12 PROCEDURE — C1769: CPT

## 2024-04-12 PROCEDURE — 70544 MR ANGIOGRAPHY HEAD W/O DYE: CPT | Mod: 26,59

## 2024-04-12 PROCEDURE — 76937 US GUIDE VASCULAR ACCESS: CPT

## 2024-04-12 PROCEDURE — 99232 SBSQ HOSP IP/OBS MODERATE 35: CPT

## 2024-04-12 PROCEDURE — 70551 MRI BRAIN STEM W/O DYE: CPT | Mod: MC

## 2024-04-12 PROCEDURE — 36226 PLACE CATH VERTEBRAL ART: CPT

## 2024-04-12 PROCEDURE — 82962 GLUCOSE BLOOD TEST: CPT

## 2024-04-12 PROCEDURE — C9399: CPT

## 2024-04-12 PROCEDURE — C1887: CPT

## 2024-04-12 PROCEDURE — 85027 COMPLETE CBC AUTOMATED: CPT

## 2024-04-12 PROCEDURE — 85576 BLOOD PLATELET AGGREGATION: CPT

## 2024-04-12 PROCEDURE — 75894 X-RAYS TRANSCATH THERAPY: CPT

## 2024-04-12 PROCEDURE — C1889: CPT

## 2024-04-12 PROCEDURE — 80048 BASIC METABOLIC PNL TOTAL CA: CPT

## 2024-04-12 RX ORDER — ACETAMINOPHEN 500 MG
2 TABLET ORAL
Qty: 0 | Refills: 0 | DISCHARGE
Start: 2024-04-12

## 2024-04-12 RX ADMIN — Medication 1 DROP(S): at 06:01

## 2024-04-12 RX ADMIN — TICAGRELOR 90 MILLIGRAM(S): 90 TABLET ORAL at 06:01

## 2024-04-12 RX ADMIN — Medication 1 DROP(S): at 11:46

## 2024-04-12 RX ADMIN — Medication 650 MILLIGRAM(S): at 08:57

## 2024-04-12 RX ADMIN — Medication 81 MILLIGRAM(S): at 06:01

## 2024-04-12 RX ADMIN — Medication 650 MILLIGRAM(S): at 09:20

## 2024-04-12 NOTE — PROGRESS NOTE ADULT - SUBJECTIVE AND OBJECTIVE BOX
HOSPITAL COURSE: This is a 38 year old female with PMH latex allergy and migraines upon which work-up revealed a CTA with an incidental 5mm right supraclinoid ICA aneurysm. She denies known family history of cerebral aneurysms or aneurysm rupture. Pt now presents to [facility] for scheduled cerebral angiogram and embolization with Dr. Roe on 4/11/24 at the interventional radiology suite.      Admission Scores  GCS:   HH:   MF:   NIHSS:   RASS:    CAM-ICU:   ICH:    24 hour Events:       04/11: s/p R ICA superior hypophyseal aneurysm embolization.    Allergies    No Known Drug Allergies  latex (Rash; Urticaria)    Intolerances        REVIEW OF SYSTEMS: [ ] Unable to Assess due to neurologic exam   [ ] All ROS addressed below are non-contributory, except:  Neuro: [ ] Headache [ ] Back pain [ ] Numbness [ ] Weakness [ ] Ataxia [ ] Dizziness [ ] Aphasia [ ] Dysarthria [ ] Visual disturbance  Resp: [ ] Shortness of breath/dyspnea, [ ] Orthopnea [ ] Cough  CV: [ ] Chest pain [ ] Palpitation [ ] Lightheadedness [ ] Syncope  Renal: [ ] Thirst [ ] Edema  GI: [ ] Nausea [ ] Emesis [ ] Abdominal pain [ ] Constipation [ ] Diarrhea  Hem: [ ] Hematemesis [ ] bright red blood per rectum  ID: [ ] Fever [ ] Chills [ ] Dysuria  ENT: [ ] Rhinorrhea      DEVICES:   [ ] Restraints [ ] ET tube [ ] central line [ ] arterial line [ ] marie [ ] NGT/OGT [ ] EVD [ ] LD [ ] ELIGIO/HMV [ ] Trach [ ] PEG [ ] Chest Tube     VITALS:   Vital Signs Last 24 Hrs  T(C): 36.7 (11 Apr 2024 12:30), Max: 36.8 (11 Apr 2024 08:53)  T(F): 98 (11 Apr 2024 12:30), Max: 98.3 (11 Apr 2024 08:53)  HR: 80 (11 Apr 2024 12:30) (66 - 80)  BP: 119/81 (11 Apr 2024 09:20) (119/81 - 119/81)  BP(mean): --  RR: 26 (11 Apr 2024 12:30) (18 - 26)  SpO2: 93% (11 Apr 2024 12:30) (93% - 100%)    Parameters below as of 11 Apr 2024 12:30  Patient On (Oxygen Delivery Method): nasal cannula  O2 Flow (L/min): 2    CAPILLARY BLOOD GLUCOSE        I&O's Summary      Respiratory:        LABS:                        10.6   6.84  )-----------( 269      ( 11 Apr 2024 13:27 )             32.9     04-11    137  |  108  |  12  ----------------------------<  104<H>  4.0   |  20<L>  |  0.79             MEDICATION LEVELS:     IVF FLUIDS/MEDICATIONS:   MEDICATIONS  (STANDING):  acetaminophen   IVPB .. 1000 milliGRAM(s) IV Intermittent once  artificial  tears Solution 1 Drop(s) Both EYES every 6 hours  atorvastatin 40 milliGRAM(s) Oral at bedtime  insulin lispro (ADMELOG) corrective regimen sliding scale   SubCutaneous Before meals and at bedtime  polyethylene glycol 3350 17 Gram(s) Oral at bedtime  senna 2 Tablet(s) Oral at bedtime  sodium chloride 0.9%. 1000 milliLiter(s) (70 mL/Hr) IV Continuous <Continuous>  ticagrelor 90 milliGRAM(s) Oral <User Schedule>    MEDICATIONS  (PRN):  acetaminophen     Tablet .. 650 milliGRAM(s) Oral every 6 hours PRN Temp greater or equal to 38.5C (101.3F), Mild Pain (1 - 3)  ondansetron Injectable 4 milliGRAM(s) IV Push every 6 hours PRN Nausea and/or Vomiting        IMAGING:      EXAMINATION:  PHYSICAL EXAM:    Constitutional: No Acute Distress     Neurological: Awake, alert oriented to person, place and time, Following Commands, PERRL, EOMI, No Gaze Preference, Face Symmetrical, Speech Fluent, No dysmetria, No ataxia, No nystagmus     Motor exam:          Upper extremity                         Delt     Bicep     Tricep    HG                                                 R         5/5        5/5        5/5       5/5                                               L          5/5        5/5        5/5       5/5          Lower extremity                        HF         KF        KE       DF         PF                                                  R        5/5        5/5        5/5       5/5         5/5                                               L         5/5        5/5       5/5       5/5          5/5                                                 Sensation: [ ] intact to light touch  [ ] decreased:     Reflexes: Deep Tendon Reflexes Intact     Pulmonary: Clear to Auscultation, No rales, No rhonchi, No wheezes     Cardiovascular: S1, S2, Regular rate and rhythm     Gastrointestinal: Soft, Non-tender, Non-distended     Extremities: No calf tenderness     Incision:   
Patient seen and examined at bedside.    --Anticoagulation--  aspirin  chewable 81 milliGRAM(s) Oral <User Schedule>  ticagrelor 90 milliGRAM(s) Oral <User Schedule>    T(C): 37.2 (04-11-24 @ 23:00), Max: 37.2 (04-11-24 @ 23:00)  HR: 74 (04-11-24 @ 23:00) (63 - 91)  BP: 119/81 (04-11-24 @ 09:20) (119/81 - 119/81)  RR: 24 (04-11-24 @ 23:00) (13 - 45)  SpO2: 96% (04-11-24 @ 23:00) (93% - 100%)  Wt(kg): --    Exam: AOx3, PERRL EOMI no facial no drift, SOUZA 5/5, SILT, has HA behind R eye and c/o paresthesias/discomfort to L side of body, groin c/d/i
HOSPITAL COURSE: This is a 38 year old female with PMH latex allergy and migraines upon which work-up revealed a CTA with an incidental 5mm right supraclinoid ICA aneurysm. She denies known family history of cerebral aneurysms or aneurysm rupture. Pt now presents to [facility] for scheduled cerebral angiogram and embolization with Dr. Roe on 4/11/24 at the interventional radiology suite.      Admission Scores  GCS:   HH:   MF:   NIHSS:   RASS:    CAM-ICU:   ICH:    24 hour Events:       04/11: s/p R ICA superior hypophyseal aneurysm embolization.    Allergies    No Known Drug Allergies  latex (Rash; Urticaria)    Intolerances        REVIEW OF SYSTEMS: [ ] Unable to Assess due to neurologic exam   [ ] All ROS addressed below are non-contributory, except:  Neuro: [ ] Headache [ ] Back pain [ ] Numbness [ ] Weakness [ ] Ataxia [ ] Dizziness [ ] Aphasia [ ] Dysarthria [ ] Visual disturbance  Resp: [ ] Shortness of breath/dyspnea, [ ] Orthopnea [ ] Cough  CV: [ ] Chest pain [ ] Palpitation [ ] Lightheadedness [ ] Syncope  Renal: [ ] Thirst [ ] Edema  GI: [ ] Nausea [ ] Emesis [ ] Abdominal pain [ ] Constipation [ ] Diarrhea  Hem: [ ] Hematemesis [ ] bright red blood per rectum  ID: [ ] Fever [ ] Chills [ ] Dysuria  ENT: [ ] Rhinorrhea      DEVICES:   [ ] Restraints [ ] ET tube [ ] central line [ ] arterial line [ ] marie [ ] NGT/OGT [ ] EVD [ ] LD [ ] ELIGIO/HMV [ ] Trach [ ] PEG [ ] Chest Tube     VITALS:   Vital Signs Last 24 Hrs  T(C): 36.7 (11 Apr 2024 12:30), Max: 36.8 (11 Apr 2024 08:53)  T(F): 98 (11 Apr 2024 12:30), Max: 98.3 (11 Apr 2024 08:53)  HR: 80 (11 Apr 2024 12:30) (66 - 80)  BP: 119/81 (11 Apr 2024 09:20) (119/81 - 119/81)  BP(mean): --  RR: 26 (11 Apr 2024 12:30) (18 - 26)  SpO2: 93% (11 Apr 2024 12:30) (93% - 100%)    Parameters below as of 11 Apr 2024 12:30  Patient On (Oxygen Delivery Method): nasal cannula  O2 Flow (L/min): 2    CAPILLARY BLOOD GLUCOSE        I&O's Summary      Respiratory:        LABS:                        10.6   6.84  )-----------( 269      ( 11 Apr 2024 13:27 )             32.9     04-11    137  |  108  |  12  ----------------------------<  104<H>  4.0   |  20<L>  |  0.79             MEDICATION LEVELS:     IVF FLUIDS/MEDICATIONS:   MEDICATIONS  (STANDING):  acetaminophen   IVPB .. 1000 milliGRAM(s) IV Intermittent once  artificial  tears Solution 1 Drop(s) Both EYES every 6 hours  atorvastatin 40 milliGRAM(s) Oral at bedtime  insulin lispro (ADMELOG) corrective regimen sliding scale   SubCutaneous Before meals and at bedtime  polyethylene glycol 3350 17 Gram(s) Oral at bedtime  senna 2 Tablet(s) Oral at bedtime  sodium chloride 0.9%. 1000 milliLiter(s) (70 mL/Hr) IV Continuous <Continuous>  ticagrelor 90 milliGRAM(s) Oral <User Schedule>    MEDICATIONS  (PRN):  acetaminophen     Tablet .. 650 milliGRAM(s) Oral every 6 hours PRN Temp greater or equal to 38.5C (101.3F), Mild Pain (1 - 3)  ondansetron Injectable 4 milliGRAM(s) IV Push every 6 hours PRN Nausea and/or Vomiting        IMAGING:      EXAMINATION:  PHYSICAL EXAM:    Constitutional: No Acute Distress     Neurological: Awake, alert oriented to person, place and time, Following Commands, PERRL, EOMI, No Gaze Preference, Face Symmetrical, Speech Fluent, No dysmetria, No ataxia, No nystagmus     Motor exam:          Upper extremity                         Delt     Bicep     Tricep    HG                                                 R         5/5        5/5        5/5       5/5                                               L          5/5        5/5        5/5       5/5          Lower extremity                        HF         KF        KE       DF         PF                                                  R        5/5        5/5        5/5       5/5         5/5                                               L         5/5        5/5       5/5       5/5          5/5                                                 Sensation: [ ] intact to light touch  [ ] decreased:     Reflexes: Deep Tendon Reflexes Intact     Pulmonary: Clear to Auscultation, No rales, No rhonchi, No wheezes     Cardiovascular: S1, S2, Regular rate and rhythm     Gastrointestinal: Soft, Non-tender, Non-distended     Extremities: No calf tenderness     Incision:   
NSICU NIGHT PROGRESS NOTE     24 hour Events:     04/11: s/p R ICA superior hypophyseal aneurysm embolization.    Exam   wake, alert oriented to person, place and time, Following Commands, PERRL, EOMI, No Gaze Preference, Face Symmetrical, Speech Fluent, No dysmetria, No ataxia, No nystagmus   UE/LE 5/5         VITALS:   Vital Signs Last 24 Hrs  T(C): 37.2 (11 Apr 2024 23:00), Max: 37.2 (11 Apr 2024 23:00)  T(F): 99 (11 Apr 2024 23:00), Max: 99 (11 Apr 2024 23:00)  HR: 74 (11 Apr 2024 23:00) (63 - 91)  BP: 119/81 (11 Apr 2024 09:20) (119/81 - 119/81)  BP(mean): --  RR: 24 (11 Apr 2024 23:00) (13 - 45)  SpO2: 96% (11 Apr 2024 23:00) (93% - 100%)    Parameters below as of 11 Apr 2024 23:00  Patient On (Oxygen Delivery Method): room air      CAPILLARY BLOOD GLUCOSE      POCT Blood Glucose.: 221 mg/dL (11 Apr 2024 21:21)    I&O's Summary    11 Apr 2024 07:01  -  11 Apr 2024 23:54  --------------------------------------------------------  IN: 2060 mL / OUT: 1155 mL / NET: 905 mL        Respiratory:        LABS:                        10.6   6.84  )-----------( 269      ( 11 Apr 2024 13:27 )             32.9     04-11    137  |  108  |  12  ----------------------------<  104<H>  4.0   |  20<L>  |  0.79        MEDICATION LEVELS:     IVF FLUIDS/MEDICATIONS:   MEDICATIONS  (STANDING):  artificial  tears Solution 1 Drop(s) Both EYES every 6 hours  atorvastatin 40 milliGRAM(s) Oral at bedtime  chlorhexidine 4% Liquid 1 Application(s) Topical at bedtime  insulin lispro (ADMELOG) corrective regimen sliding scale   SubCutaneous Before meals and at bedtime  polyethylene glycol 3350 17 Gram(s) Oral at bedtime  senna 2 Tablet(s) Oral at bedtime  sodium chloride 0.9%. 1000 milliLiter(s) (70 mL/Hr) IV Continuous <Continuous>  ticagrelor 90 milliGRAM(s) Oral <User Schedule>    MEDICATIONS  (PRN):  acetaminophen     Tablet .. 650 milliGRAM(s) Oral every 6 hours PRN Temp greater or equal to 38.5C (101.3F), Mild Pain (1 - 3)  ondansetron Injectable 4 milliGRAM(s) IV Push every 6 hours PRN Nausea and/or Vomiting

## 2024-04-12 NOTE — DISCHARGE NOTE PROVIDER - NSDCMRMEDTOKEN_GEN_ALL_CORE_FT
aspirin 81 mg oral delayed release tablet: 1 tab(s) orally once a day  atorvastatin 40 mg oral tablet: 1 tab(s) orally once a day (at bedtime)  Brilinta (ticagrelor) 90 mg oral tablet: 1 tab(s) orally 2 times a day   acetaminophen 325 mg oral tablet: 2 tab(s) orally every 6 hours as needed for  mild pain  aspirin 81 mg oral delayed release tablet: 1 tab(s) orally once a day  atorvastatin 40 mg oral tablet: 1 tab(s) orally once a day (at bedtime)  Brilinta (ticagrelor) 90 mg oral tablet: 1 tab(s) orally 2 times a day

## 2024-04-12 NOTE — DISCHARGE NOTE NURSING/CASE MANAGEMENT/SOCIAL WORK - NSDCPEFALRISK_GEN_ALL_CORE
For information on Fall & Injury Prevention, visit: [email or web address] OR  [email or web address] OR  https://www.cdc.gov/steadi/patient.html

## 2024-04-12 NOTE — PROGRESS NOTE ADULT - ATTENDING COMMENTS
39 yo woman with h/o migraines, s/p elective treatment of R superior hypophyseal artery aneurysm with JOSE X flow diverting stent.    On exam neurointact. R wrist with intact radial pulse.     c/w ASA 81 mg daily and Brilinta, ARU and PRU tomorrow     Patient seen and examined by attending on 4/11/2023.    Patient is critically ill due to endovascular treatment of intracranial aneurysm and at high risk for neurological deterioration or death due to: potential complications such as stroke and ICH requiring close neurologic monitoring.
37 yo woman with h/o migraines, s/p elective treatment of R superior hypophyseal artery aneurysm with JOSE X flow diverting stent.    On exam neurointact.     c/w ASA 81 mg daily and Brilinta, / MR NOVA today    Patient seen and examined by attending on 4/12/2023.    Patient is not critically ill but is medically complex due to endovascular treatment of intracranial aneurysm.

## 2024-04-12 NOTE — DISCHARGE NOTE NURSING/CASE MANAGEMENT/SOCIAL WORK - PATIENT PORTAL LINK FT
You can access the FollowMyHealth Patient Portal offered by [facility] by registering at the following website: [email or web address]. By joining [facility]’s FollowMyHealth portal, you will also be able to view your health information using other applications (apps) compatible with our system.

## 2024-04-12 NOTE — DISCHARGE NOTE NURSING/CASE MANAGEMENT/SOCIAL WORK - NSTOBACCONEVERSMOKERY/N_GEN_A
"Pharmacy Chemotherapy Verification    DX: triple negative breast cancer    Cycle 4 Day 15  Previous treatment = 6/14/23    Regimen: pembrolizumab + PACLitaxel/CARBOplatin --> pembrolizumab + cyclophosphamide/DOOXrubicin or epirubicin --> Pembrolizmab  Pembrolizumab 200 mg IV over 30 min on day 1  PACLitaxel 80 mg/m2 IV over 60 min on days 1,8,15  CARBOplatin AUC 1.5 IV over 30 min on day 1,8,15  Q21 days x 4 cycles  Followed by  Pembrolizumab 200 mg IV over 30 min on day 1  Cyclophosphamide 600 mg/m2 IV over 30 min on day 1  DOXOrubicin 60 mg/m2 IVP on day 1 OR epirubicin 90 mg/m2 IVP on day 1  Q21 days x 4 cycles  Followed by  Pembrolizumab 200mg IV over 30 min on day 1  Q21 days x 9 cycles  NCCN Guidelines for Breast Cancer V.2.2022  Alexis P, et al- N Engl J Med. 2020 Feb 27;382(9):810-821.     Allergies:Patient has no known allergies.  /78   Pulse 94   Temp 36.6 °C (97.8 °F) (Temporal)   Resp 18   Ht 1.6 m (5' 2.99\")   Wt 84.3 kg (185 lb 13.6 oz)   SpO2 97%   BMI 32.93 kg/m²   Body surface area is 1.94 meters squared.    Labs 6/27/23 ([facility]):  ANC~ 1590 Plt = 182k   Hgb = 10     SCr = 0.5 mg/dL CrCl ~ 115 mL/min (Mnimum SCr of 0.7 used)  LFT's = WNL TBili = 0.6     Pembrolizumab 200 mg fixed dose, no calculation required  OK to treat with final dose = 200mg IV on Day 1    PACLitaxel 80 mg/m2  x 1.94 m2 = 155.2 mg   <10% difference, ok to treat with final dose = 162 mg IV on Days 1,8, and 15    CARBOplatin AUC 1.5 (115 mL/min + 25) = 210 mg   <10% difference, ok to treat with final dose = 190 mg IV on Days 1,8, and 15    Michelle Castro, PharmD    " No

## 2024-04-12 NOTE — DISCHARGE NOTE PROVIDER - NSDCCPCAREPLAN_GEN_ALL_CORE_FT
PRINCIPAL DISCHARGE DIAGNOSIS  Diagnosis: Nonruptured cerebral aneurysm  Assessment and Plan of Treatment: Please follow up with Dr. Roe in 1-2 weeks. Please call office to make appointment.   You are being discharged on Aspirin and Brilinta.  These medications are NECESSARY for you to take due to your Stent and could cause major complications if you stop taking them without instruction of your Neurosurgeon.  Please be aware that both of these medications can cause excessive bleeding/bruising.  You have a puncture on your groin where the procedure was done, please monitor for excessive bruising, bleeding, palpable mass under the site and seek medical advice if present.  No strenous activity. No heavy lifting.   Do not return to work or operate a motor vehicle until cleared by physician.   You may shower after you surgery.   Please follow up with your Primary Care Physician to address any medications you are taking.  Please call office to make appointment.  Please return immediately to the ED for any of the following: fevers, bleeding, swelling, pain not relieved by medication, increased sluggishness or irritability, increased nausea or vomiting, inability to tolerate foods or liquids, increased numbness/tingling/ or inability to move extremities, seizures, chest pain, shortness of breathe.

## 2024-04-12 NOTE — DISCHARGE NOTE PROVIDER - NSDCCPTREATMENT_GEN_ALL_CORE_FT
PRINCIPAL PROCEDURE  Procedure: Angiogram, cerebral, with flow-diverting stent insertion  Findings and Treatment: 4/11/2024

## 2024-04-12 NOTE — DISCHARGE NOTE PROVIDER - CARE PROVIDER_API CALL
Gordon Roe  Neurosurgery  [address], [address]  [place] [address]  Phone: [phone]  Fax: [phone]  Follow Up Time:

## 2024-04-12 NOTE — DISCHARGE NOTE PROVIDER - HOSPITAL COURSE
38 year old female with PMH latex allergy and migraines upon which work-up revealed a CTA with an incidental 5mm right supraclinoid ICA aneurysm. She denies known family history of cerebral aneurysms or aneurysm rupture. Pt now presents [facility] for scheduled cerebral angiogram and embolization with Dr. Roe on 4/11/24 at the interventional radiology suite. Patient s/p stent placement for Right ICA superior hypophyseal aneurysm. Underwent MR NOVA which showed ___ . Repeat PRU level was 80.    On the day of discharge she is medically and neurologically stable for discharge to home.      38 year old female with PMH latex allergy and migraines upon which work-up revealed a CTA with an incidental 5mm right supraclinoid ICA aneurysm. She denies known family history of cerebral aneurysms or aneurysm rupture. Pt now presents to [facility] for scheduled cerebral angiogram and embolization with Dr. Roe on 4/11/24 at the interventional radiology suite. Patient s/p stent placement for Right ICA superior hypophyseal aneurysm. Underwent MR NOVA which showed good intracranial flow. Repeat PRU level was 80.     On the day of discharge she is medically and neurologically stable for discharge to home.

## 2024-04-12 NOTE — PROGRESS NOTE ADULT - ASSESSMENT
ASSESSMENT/PLAN: s/p R ICA superior hypophyseal aneurysm embolization.    NEURO:  - Neurochecks Q 1, vitals Q1  - MRI/NOVA am  - C/W ASA 81 _ Brilinta 90 BID  - ARU/PRU in the AM  Activity: [] OOB as tolerated [] Bedrest [] PT [] OT [] PMNR    PULM:  RA  sat > 92%    CV:  SBP goal:     RENAL:  Fluids: NS 70 ccs/hour    GI:  Diet: NPO pending dysphagia  GI prophylaxis [] not indicated [] PPI [] other:  Bowel regimen [] colace [] senna [] other:    ENDO:   Goal euglycemia (-180)    HEME/ONC:  VTE prophylaxis: [x] SCDs [] chemoprophylaxis [] high risk of DVT/PE on admission due to:    ID: afebrile    MISC:    SOCIAL/FAMILY:  [] updated at bedside [] family meeting    CODE STATUS:  [] Full Code [] DNR [] DNI [] Palliative/Comfort Care    DISPOSITION:  [] ICU [] Stroke Unit [] Floor [] EMU [] RCU [] PCU    [] Patient is at high risk of neurologic deterioration/death due to:     Time seen:  Time spent: ___ [] critical care minutes ASSESSMENT/PLAN: s/p R ICA superior hypophyseal aneurysm embolization.    NEURO:  - Neurochecks Q 4, vitals Q4  - MRI/NOVA am  - C/W ASA 81 _ Brilinta 90 BID  Activity: [] OOB as tolerated [] Bedrest [] PT [] OT [] PMNR    PULM:  RA  sat > 92%    CV:  SBP goal:     RENAL:  Fluids: IVL    GI:  Diet: CCB  GI prophylaxis [] not indicated [] PPI [] other:  Bowel regimen [] colace [] senna [] other:    ENDO:   Goal euglycemia (-180)    HEME/ONC:  VTE prophylaxis: [x] SCDs [] chemoprophylaxis [] high risk of DVT/PE on admission due to:    ID: afebrile    MISC:    SOCIAL/FAMILY:  [] updated at bedside [] family meeting    CODE STATUS:  [] Full Code [] DNR [] DNI [] Palliative/Comfort Care    DISPOSITION:  [] ICU [] Stroke Unit [] Floor [] EMU [] RCU [] PCU    [] Patient is at high risk of neurologic deterioration/death due to:     Time seen:  Time spent: ___ [] critical care minutes

## 2024-04-15 PROBLEM — E78.5 HYPERLIPIDEMIA, UNSPECIFIED: Chronic | Status: ACTIVE | Noted: 2024-04-04

## 2024-04-15 PROBLEM — Z91.040 LATEX ALLERGY STATUS: Chronic | Status: ACTIVE | Noted: 2024-04-04

## 2024-04-15 PROBLEM — G43.909 MIGRAINE, UNSPECIFIED, NOT INTRACTABLE, WITHOUT STATUS MIGRAINOSUS: Chronic | Status: ACTIVE | Noted: 2024-04-04

## 2024-04-15 PROBLEM — I67.1 CEREBRAL ANEURYSM, NONRUPTURED: Chronic | Status: ACTIVE | Noted: 2024-04-04

## 2024-04-15 PROBLEM — Q21.12 PATENT FORAMEN OVALE: Chronic | Status: ACTIVE | Noted: 2024-04-04

## 2024-04-22 ENCOUNTER — NON-APPOINTMENT (OUTPATIENT)
Age: 39
End: 2024-04-22

## 2024-04-23 ENCOUNTER — APPOINTMENT (OUTPATIENT)
Dept: NEUROSURGERY | Facility: CLINIC | Age: 39
End: 2024-04-23
Payer: MEDICAID

## 2024-04-23 ENCOUNTER — LABORATORY RESULT (OUTPATIENT)
Age: 39
End: 2024-04-23

## 2024-04-23 ENCOUNTER — APPOINTMENT (OUTPATIENT)
Dept: DERMATOLOGY | Facility: CLINIC | Age: 39
End: 2024-04-23
Payer: COMMERCIAL

## 2024-04-23 VITALS
BODY MASS INDEX: 27.82 KG/M2 | WEIGHT: 167 LBS | OXYGEN SATURATION: 96 % | SYSTOLIC BLOOD PRESSURE: 117 MMHG | DIASTOLIC BLOOD PRESSURE: 82 MMHG | HEART RATE: 78 BPM | HEIGHT: 65 IN

## 2024-04-23 DIAGNOSIS — L30.9 DERMATITIS, UNSPECIFIED: ICD-10-CM

## 2024-04-23 PROCEDURE — 99213 OFFICE O/P EST LOW 20 MIN: CPT

## 2024-04-23 PROCEDURE — 99204 OFFICE O/P NEW MOD 45 MIN: CPT

## 2024-04-23 NOTE — HISTORY OF PRESENT ILLNESS
[FreeTextEntry1] : ANDREZ HENRY is a 38 year old female with PMH of migraines, latex allergy who presented to [facility] on 2/15/24 with intermittent left sided weakness. Neurology consulted, work-up without evidence of strokes, likely polyarthralgia. Started on ASA 81. CTA showed incidental 5mm right supraclinoid ICA aneurysm. Denies known family history of cerebral aneurysms or aneurysm rupture. Not a smoker. 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 On 3/21/24, she underwent diagnostic cerebral angiogram demonstrating a 5.4 x 3.5 x 3.6 mm right superior hypophyseal aneurysm and a small 1.9 mm x 1.7 mm left clinoid segment aneurysm.  On 4/11/24, she underwent JOSE x embo of right superior hypophyseal aneurysm. Post-op MR NOVA showed good intracranial flow. Maintained on aspirin 81mg daily and Brilinta 90mg BID.   Today, she presents for post-embolization follow up feeling overall well. She complains of dry skin/rash over much of her body since being on Brilinta.

## 2024-04-23 NOTE — REASON FOR VISIT
[Post Hospitalization] : a post hospitalization visit [FreeTextEntry1] : s/p Cerebral angiogram and JOSE X embolization of unruptured right superior hypophyseal artery aneurysm 4/11/24 s/p Diagnostic cerebral angiogram 3/21/24

## 2024-04-23 NOTE — ASSESSMENT
[FreeTextEntry1] : IMPRESSION: 38F with PMH of migraines, latex allergy p/w intermittent L sided hemiparesis, likely polyarthralgia. No strokes on MRI. s/p ILR placement. CTA with incidental 5mm right supraclinoid ICA aneurysm. No family history of aneurysms or smoking history. 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 s/p JOSE X embo of R superior hypophyseal aneurysm 4/11/24. On ASA 81 and Brilinta 90 BID.   Doing well on first post hospital visit. Main complaint is dry skin/rash throughout her body. Has some bruising/pain over both wrists which I assured her should get better over time. Some headaches/right periorbital discomfort which should also improve over time.    PLAN: Will switch from Brilinta to Plavix, needs load and then PRU checked once off Brilinta MRA head non con NOVA, MRI head non con in 3 months post-embo - July 2024 Follow up after to review results 6 month repeat cerebral angiogram - October 2024

## 2024-04-24 ENCOUNTER — NON-APPOINTMENT (OUTPATIENT)
Age: 39
End: 2024-04-24

## 2024-04-24 NOTE — PHYSICAL EXAM
[Alert] : alert [Oriented x 3] : ~L oriented x 3 [Well Nourished] : well nourished [Conjunctiva Non-injected] : conjunctiva non-injected [No Visual Lymphadenopathy] : no visual  lymphadenopathy [No Clubbing] : no clubbing [No Edema] : no edema [No Bromhidrosis] : no bromhidrosis [No Chromhidrosis] : no chromhidrosis [Declined] : declined [FreeTextEntry3] :  diffuse xerosis w scaly patches on lower legs superficial desquamation on bl dorsal feet, between toes

## 2024-04-24 NOTE — HISTORY OF PRESENT ILLNESS
[FreeTextEntry1] : npa - itchy rash [de-identified] : Pt is a 38 year old F presenting for  1. Extremely pruritic rash on legs/feet that started around April 4th after pt started Brilinta. Denies a hx of eczema, asthma. Pt's daughter has eczema. No one else at home is itchy. Using Eucerin moisturizer w/o relief.  s/p recent hospitalization, cerebral angiogram and embolization of unruptured right superior hypophyseal artery aneurysm 4/11/24 Meds: brilinta and ASA (started in April), statin (started in Feb)

## 2024-04-24 NOTE — ASSESSMENT
[FreeTextEntry1] : # Dermatitis # Generalized pruritus new diagnosis of uncertain prognosis without a clear primary dermatologic process on exam. Ddx includes underlying systemic cause of generalized pruritus, does have some scaly patches on the feet - r/o tinea pedis with id reaction.  - fungal culture of scale collected today from foot - ordered generalized pruritus labs: CBC, CMP, TSH - start Triamcinolone 0.1% ointment to affected areas BID for 2 weeks, then take 1 week off, repeat PRN. Do not apply to face/groin. Do not use for more than 2 weeks at a time, with 1 week off in between. Side effects discussed with pt including but not limited to thinning of the skin, atrophy and dyspigmentation. - start daily Allegra qAM and Hydroxyzine 25mg qPM PRN itch. SED including drowsiness.  # Xerosis - Principles of dry skin care reviewed including: importance of using an emollient 2-3x/day, using gentle products, and avoiding fragranced products including soaps and detergent  RTC 3-4 weeks

## 2024-04-25 ENCOUNTER — NON-APPOINTMENT (OUTPATIENT)
Age: 39
End: 2024-04-25

## 2024-04-25 LAB
ALBUMIN SERPL ELPH-MCNC: 4.3 G/DL
ALP BLD-CCNC: 62 U/L
ALT SERPL-CCNC: 21 U/L
ANION GAP SERPL CALC-SCNC: 12 MMOL/L
AST SERPL-CCNC: 18 U/L
BASOPHILS # BLD AUTO: 0.04 K/UL
BASOPHILS NFR BLD AUTO: 0.7 %
BILIRUB SERPL-MCNC: 0.4 MG/DL
BUN SERPL-MCNC: 14 MG/DL
CALCIUM SERPL-MCNC: 9.4 MG/DL
CHLORIDE SERPL-SCNC: 106 MMOL/L
CO2 SERPL-SCNC: 23 MMOL/L
CREAT SERPL-MCNC: 1.01 MG/DL
EGFR: 72 ML/MIN/1.73M2
EOSINOPHIL # BLD AUTO: 0.17 K/UL
EOSINOPHIL NFR BLD AUTO: 2.8 %
GLUCOSE SERPL-MCNC: 90 MG/DL
HCT VFR BLD CALC: 36.1 %
HGB BLD-MCNC: 10.9 G/DL
IMM GRANULOCYTES NFR BLD AUTO: 0.2 %
LYMPHOCYTES # BLD AUTO: 1.68 K/UL
LYMPHOCYTES NFR BLD AUTO: 28 %
MAN DIFF?: NORMAL
MCHC RBC-ENTMCNC: 25.1 PG
MCHC RBC-ENTMCNC: 30.2 GM/DL
MCV RBC AUTO: 83 FL
MONOCYTES # BLD AUTO: 0.56 K/UL
MONOCYTES NFR BLD AUTO: 9.3 %
NEUTROPHILS # BLD AUTO: 3.53 K/UL
NEUTROPHILS NFR BLD AUTO: 59 %
PLATELET # BLD AUTO: 330 K/UL
POTASSIUM SERPL-SCNC: 4.4 MMOL/L
PROT SERPL-MCNC: 7 G/DL
RBC # BLD: 4.35 M/UL
RBC # FLD: 15.3 %
SODIUM SERPL-SCNC: 142 MMOL/L
TSH SERPL-ACNC: 1.89 UIU/ML
WBC # FLD AUTO: 5.99 K/UL

## 2024-04-27 ENCOUNTER — INPATIENT (INPATIENT)
Facility: HOSPITAL | Age: 39
LOS: 1 days | Discharge: HOME | DRG: 66 | End: 2024-04-29
Attending: NEUROLOGICAL SURGERY | Admitting: NEUROLOGICAL SURGERY
Payer: COMMERCIAL

## 2024-04-27 VITALS
RESPIRATION RATE: 18 BRPM | SYSTOLIC BLOOD PRESSURE: 116 MMHG | DIASTOLIC BLOOD PRESSURE: 79 MMHG | HEART RATE: 67 BPM | TEMPERATURE: 99 F | OXYGEN SATURATION: 99 % | WEIGHT: 166.89 LBS | HEIGHT: 65 IN

## 2024-04-27 DIAGNOSIS — Z98.890 OTHER SPECIFIED POSTPROCEDURAL STATES: Chronic | ICD-10-CM

## 2024-04-27 DIAGNOSIS — Z33.2 ENCOUNTER FOR ELECTIVE TERMINATION OF PREGNANCY: Chronic | ICD-10-CM

## 2024-04-27 DIAGNOSIS — Z98.51 TUBAL LIGATION STATUS: Chronic | ICD-10-CM

## 2024-04-27 LAB
ALBUMIN SERPL ELPH-MCNC: 4.2 G/DL — SIGNIFICANT CHANGE UP (ref 3.3–5)
ALP SERPL-CCNC: 59 U/L — SIGNIFICANT CHANGE UP (ref 40–120)
ALT FLD-CCNC: 21 U/L — SIGNIFICANT CHANGE UP (ref 10–45)
ANION GAP SERPL CALC-SCNC: 11 MMOL/L — SIGNIFICANT CHANGE UP (ref 5–17)
APTT BLD: 36.6 SEC — HIGH (ref 24.5–35.6)
AST SERPL-CCNC: 22 U/L — SIGNIFICANT CHANGE UP (ref 10–40)
BASE EXCESS BLDV CALC-SCNC: 0.1 MMOL/L — SIGNIFICANT CHANGE UP (ref -2–3)
BASOPHILS # BLD AUTO: 0.05 K/UL — SIGNIFICANT CHANGE UP (ref 0–0.2)
BASOPHILS NFR BLD AUTO: 1 % — SIGNIFICANT CHANGE UP (ref 0–2)
BILIRUB SERPL-MCNC: 0.3 MG/DL — SIGNIFICANT CHANGE UP (ref 0.2–1.2)
BLD GP AB SCN SERPL QL: NEGATIVE — SIGNIFICANT CHANGE UP
BUN SERPL-MCNC: 14 MG/DL — SIGNIFICANT CHANGE UP (ref 7–23)
CA-I SERPL-SCNC: 1.22 MMOL/L — SIGNIFICANT CHANGE UP (ref 1.15–1.33)
CALCIUM SERPL-MCNC: 8.8 MG/DL — SIGNIFICANT CHANGE UP (ref 8.4–10.5)
CHLORIDE BLDV-SCNC: 101 MMOL/L — SIGNIFICANT CHANGE UP (ref 96–108)
CHLORIDE SERPL-SCNC: 100 MMOL/L — SIGNIFICANT CHANGE UP (ref 96–108)
CO2 BLDV-SCNC: 29 MMOL/L — HIGH (ref 22–26)
CO2 SERPL-SCNC: 23 MMOL/L — SIGNIFICANT CHANGE UP (ref 22–31)
CREAT SERPL-MCNC: 0.82 MG/DL — SIGNIFICANT CHANGE UP (ref 0.5–1.3)
EGFR: 93 ML/MIN/1.73M2 — SIGNIFICANT CHANGE UP
EOSINOPHIL # BLD AUTO: 0.12 K/UL — SIGNIFICANT CHANGE UP (ref 0–0.5)
EOSINOPHIL NFR BLD AUTO: 2.3 % — SIGNIFICANT CHANGE UP (ref 0–6)
GAS PNL BLDV: 132 MMOL/L — LOW (ref 136–145)
GAS PNL BLDV: SIGNIFICANT CHANGE UP
GLUCOSE BLDV-MCNC: 87 MG/DL — SIGNIFICANT CHANGE UP (ref 70–99)
GLUCOSE SERPL-MCNC: 89 MG/DL — SIGNIFICANT CHANGE UP (ref 70–99)
HCG SERPL-ACNC: <2 MIU/ML — SIGNIFICANT CHANGE UP
HCO3 BLDV-SCNC: 27 MMOL/L — SIGNIFICANT CHANGE UP (ref 22–29)
HCT VFR BLD CALC: 38.2 % — SIGNIFICANT CHANGE UP (ref 34.5–45)
HCT VFR BLDA CALC: 36 % — SIGNIFICANT CHANGE UP (ref 34.5–46.5)
HGB BLD CALC-MCNC: 12 G/DL — SIGNIFICANT CHANGE UP (ref 11.7–16.1)
HGB BLD-MCNC: 11.5 G/DL — SIGNIFICANT CHANGE UP (ref 11.5–15.5)
IMM GRANULOCYTES NFR BLD AUTO: 0.4 % — SIGNIFICANT CHANGE UP (ref 0–0.9)
INR BLD: 1.03 RATIO — SIGNIFICANT CHANGE UP (ref 0.85–1.18)
LACTATE BLDV-MCNC: 1.1 MMOL/L — SIGNIFICANT CHANGE UP (ref 0.5–2)
LYMPHOCYTES # BLD AUTO: 1.77 K/UL — SIGNIFICANT CHANGE UP (ref 1–3.3)
LYMPHOCYTES # BLD AUTO: 34 % — SIGNIFICANT CHANGE UP (ref 13–44)
MAGNESIUM SERPL-MCNC: 2 MG/DL — SIGNIFICANT CHANGE UP (ref 1.6–2.6)
MCHC RBC-ENTMCNC: 24.6 PG — LOW (ref 27–34)
MCHC RBC-ENTMCNC: 30.1 GM/DL — LOW (ref 32–36)
MCV RBC AUTO: 81.8 FL — SIGNIFICANT CHANGE UP (ref 80–100)
MONOCYTES # BLD AUTO: 0.53 K/UL — SIGNIFICANT CHANGE UP (ref 0–0.9)
MONOCYTES NFR BLD AUTO: 10.2 % — SIGNIFICANT CHANGE UP (ref 2–14)
NEUTROPHILS # BLD AUTO: 2.71 K/UL — SIGNIFICANT CHANGE UP (ref 1.8–7.4)
NEUTROPHILS NFR BLD AUTO: 52.1 % — SIGNIFICANT CHANGE UP (ref 43–77)
NRBC # BLD: 0 /100 WBCS — SIGNIFICANT CHANGE UP (ref 0–0)
PCO2 BLDV: 54 MMHG — HIGH (ref 39–42)
PH BLDV: 7.31 — LOW (ref 7.32–7.43)
PLATELET # BLD AUTO: 348 K/UL — SIGNIFICANT CHANGE UP (ref 150–400)
PO2 BLDV: 18 MMHG — LOW (ref 25–45)
POTASSIUM BLDV-SCNC: 4.8 MMOL/L — SIGNIFICANT CHANGE UP (ref 3.5–5.1)
POTASSIUM SERPL-MCNC: 4.4 MMOL/L — SIGNIFICANT CHANGE UP (ref 3.5–5.3)
POTASSIUM SERPL-SCNC: 4.4 MMOL/L — SIGNIFICANT CHANGE UP (ref 3.5–5.3)
PROT SERPL-MCNC: 7 G/DL — SIGNIFICANT CHANGE UP (ref 6–8.3)
PROTHROM AB SERPL-ACNC: 11.3 SEC — SIGNIFICANT CHANGE UP (ref 9.5–13)
RBC # BLD: 4.67 M/UL — SIGNIFICANT CHANGE UP (ref 3.8–5.2)
RBC # FLD: 14.9 % — HIGH (ref 10.3–14.5)
RH IG SCN BLD-IMP: POSITIVE — SIGNIFICANT CHANGE UP
SAO2 % BLDV: 26.9 % — LOW (ref 67–88)
SODIUM SERPL-SCNC: 134 MMOL/L — LOW (ref 135–145)
TROPONIN T, HIGH SENSITIVITY RESULT: <6 NG/L — SIGNIFICANT CHANGE UP (ref 0–51)
WBC # BLD: 5.2 K/UL — SIGNIFICANT CHANGE UP (ref 3.8–10.5)
WBC # FLD AUTO: 5.2 K/UL — SIGNIFICANT CHANGE UP (ref 3.8–10.5)

## 2024-04-27 PROCEDURE — 70450 CT HEAD/BRAIN W/O DYE: CPT | Mod: 26,59,MC

## 2024-04-27 PROCEDURE — 0042T: CPT | Mod: MC

## 2024-04-27 PROCEDURE — 99291 CRITICAL CARE FIRST HOUR: CPT

## 2024-04-27 PROCEDURE — 70498 CT ANGIOGRAPHY NECK: CPT | Mod: 26,MC

## 2024-04-27 PROCEDURE — 70496 CT ANGIOGRAPHY HEAD: CPT | Mod: 26,MC

## 2024-04-27 PROCEDURE — 71045 X-RAY EXAM CHEST 1 VIEW: CPT | Mod: 26

## 2024-04-27 PROCEDURE — 93010 ELECTROCARDIOGRAM REPORT: CPT

## 2024-04-27 RX ORDER — ONDANSETRON 8 MG/1
4 TABLET, FILM COATED ORAL ONCE
Refills: 0 | Status: COMPLETED | OUTPATIENT
Start: 2024-04-27 | End: 2024-04-27

## 2024-04-27 RX ORDER — ACETAMINOPHEN 500 MG
1000 TABLET ORAL ONCE
Refills: 0 | Status: COMPLETED | OUTPATIENT
Start: 2024-04-27 | End: 2024-04-27

## 2024-04-27 RX ADMIN — ONDANSETRON 4 MILLIGRAM(S): 8 TABLET, FILM COATED ORAL at 21:39

## 2024-04-27 NOTE — ED PROVIDER NOTE - PHYSICAL EXAMINATION
Gen: no acute distress  HEENT: atraumatic, normocephalic, pupils equally round and reactive to light, extraocular muscles intact, no conjunctival injection  CV: regular rate and rhythm, normal S1/S2, no murmurs, rubs, or gallops  Resp: lungs clear to auscultation bilaterally, no rales, rhonchi, or wheezes  GI: soft, nontender, nondistended, BSx4  MSK: extremities atraumatic, no cyanosis or clubbing  Skin: warm, dry, no rashes or lesions  Neuro: 3/5 LUE, 0/5 LLE, diminished sensation L-side. +L drift  Psych: alert and oriented x3, appropriate mood and affect

## 2024-04-27 NOTE — CONSULT NOTE ADULT - SUBJECTIVE AND OBJECTIVE BOX
Neurology - Consult Note    -  Spectra: [phone] ([facility]), [phone] ([facility])  -    HPI: Patient ANDREZ HENRY is a 38y (1985) woman with a PMHx significant for migraine, HLD, brain aneurysm (s/p R ICA stent), who presents w/ CC of L-sided numbness/weakness. A few weeks ago, she presented with L-sided tingling and was diagnosed w/ R ICA superior hypophyseal aneurysm. Received stent with Dr Roe on 4/11/24 and was discharged next day. She has been taking aspirin and Brilinta (last dose this AM). She was otherwise in her usual state of health until 5pm today, when developed CP, SOB, and L-sided numbness, weakness, and tingling. She cannot lift the L leg at all and notes decreased hand  on the L.      Review of Systems:  CONSTITUTIONAL: No fevers or chills  EYES AND ENT: No visual changes or no throat pain   NECK: No pain or stiffness  RESPIRATORY: No hemoptysis or shortness of breath  CARDIOVASCULAR: No chest pain or palpitations  GASTROINTESTINAL: No melena or hematochezia  GENITOURINARY: No dysuria or hematuria  NEUROLOGICAL: +As stated in HPI above  SKIN: No itching, burning, rashes, or lesions   All other review of systems is negative unless indicated above.    Allergies:  No Known Drug Allergies  latex (Rash; Urticaria)      PMHx/PSHx/Family Hx: As above, otherwise see below   No pertinent past medical history    No pertinent past medical history    Pregnant and not yet delivered in third trimester    Latex allergy    Nonruptured cerebral aneurysm    HLD (hyperlipidemia)    Migraines    PFO (patent foramen ovale)        Social Hx:  No current use of tobacco, alcohol, or illicit drugs  Lives with ***    Medications:  MEDICATIONS  (STANDING):    MEDICATIONS  (PRN):      Vitals:  T(C): 37.1 (04-27-24 @ 17:45), Max: 37.1 (04-27-24 @ 17:45)  HR: 67 (04-27-24 @ 17:45) (67 - 67)  BP: 116/79 (04-27-24 @ 17:45) (116/79 - 116/79)  RR: 18 (04-27-24 @ 17:45) (18 - 18)  SpO2: 99% (04-27-24 @ 17:45) (99% - 99%)    Physical Examination: INCOMPLETE  General - NAD  Cardiovascular - Peripheral pulses palpable, no edema  Eyes - Fundoscopy with flat, sharp optic discs and no hemorrhage or exudates; Fundoscopy not well visualized; Fundoscopy not performed due to safety precautions in the setting of the COVID-19 pandemic    Neurologic Exam:  Mental status - Awake, Alert, Oriented to person, place, and time. Speech fluent, repetition and naming intact. Follows simple and complex commands. Attention/concentration, recent and remote memory (including registration and recall), and fund of knowledge intact    Cranial nerves - PERRLA, VFF, EOMI, face sensation (V1-V3) intact b/l, facial strength intact without asymmetry b/l, hearing intact b/l, palate with symmetric elevation, trapezius OR sternocleidomastiod 5/5 strength b/l, tongue midline on protrusion with full lateral movement    Motor - Normal bulk and tone throughout. No pronator drift.  Strength testing            Deltoid      Biceps      Triceps     Wrist Extension    Wrist Flexion     Interossei         R            5                 5               5                     5                              5                        5                 5  L             5                 5               5                     5                              5                        5                 5              Hip Flexion    Hip Extension    Knee Flexion    Knee Extension    Dorsiflexion    Plantar Flexion  R              5                           5                       5                           5                            5                          5  L              5                           5                        5                           5                            5                          5    Sensation - Light touch/temperature OR pain/vibration intact throughout    DTR's -             Biceps      Triceps     Brachioradialis      Patellar    Ankle    Toes/plantar response  R             2+             2+                  2+                       2+            2+                 Down  L              2+             2+                 2+                        2+           2+                 Down    Coordination - Finger to Nose intact b/l. No tremors appreciated    Gait and station - Normal casual gait. Romberg (-)    Labs:          CAPILLARY BLOOD GLUCOSE      POCT Blood Glucose.: 78 mg/dL (27 Apr 2024 21:00)          CSF:                  Radiology:  [facility] w/o contrast 4/27/24 - no acute findings on my read   Neurology - Consult Note    -  Spectra: [phone] ([facility]), [phone] ([facility])  -    HPI: Patient ANDREZ HENRY is a 38y (1985) woman with a PMHx significant for migraine, HLD, brain aneurysm (s/p R ICA stent), who presents w/ CC of L-sided numbness/weakness. A few weeks ago, she presented with L-sided tingling and was diagnosed w/ R ICA superior hypophyseal aneurysm. Received stent with Dr Roe on 4/11/24 and was discharged next day. She has been taking aspirin and Brilinta (last dose this AM). She was otherwise in her usual state of health until 5pm today, when developed CP, SOB, and L-sided numbness, weakness, and tingling. She endorses numbness in LUE and LLE and has difficulty lifting them, although symptoms have improved while in the ED. She also notes a pounding headache that starts on the R temple and spreads across the forehead. She also endorses blurry vision and nausea. She typically has headaches every other day. Does not take medications, as Tylenol has been ineffective. She is normally independent in ADLs at baseline.      Review of Systems:  CONSTITUTIONAL: No fevers or chills; +headache  EYES AND ENT: no throat pain; +blurry vision   NECK: No pain or stiffness  RESPIRATORY: No hemoptysis or shortness of breath  CARDIOVASCULAR: No chest pain or palpitations  GASTROINTESTINAL: No melena or hematochezia; +nausea  GENITOURINARY: No dysuria or hematuria  NEUROLOGICAL: +As stated in HPI above  SKIN: No itching, burning, rashes, or lesions   All other review of systems is negative unless indicated above.    Allergies:  No Known Drug Allergies  latex (Rash; Urticaria)      PMHx/PSHx/Family Hx: As above, otherwise see below   No pertinent past medical history    No pertinent past medical history    Pregnant and not yet delivered in third trimester    Latex allergy    Nonruptured cerebral aneurysm    HLD (hyperlipidemia)    Migraines    PFO (patent foramen ovale)        Social Hx:  No current use of tobacco, alcohol, or illicit drugs    Medications:  MEDICATIONS  (STANDING):    MEDICATIONS  (PRN):      Vitals:  T(C): 37.1 (04-27-24 @ 17:45), Max: 37.1 (04-27-24 @ 17:45)  HR: 67 (04-27-24 @ 17:45) (67 - 67)  BP: 116/79 (04-27-24 @ 17:45) (116/79 - 116/79)  RR: 18 (04-27-24 @ 17:45) (18 - 18)  SpO2: 99% (04-27-24 @ 17:45) (99% - 99%)    Physical Examination:   General - NAD, young well-developed female  Cardiovascular - Peripheral pulses palpable, no edema  Eyes - Fundoscopy not performed due to safety precautions in the setting of the COVID-19 pandemic    Neurologic Exam:  Mental status - Awake, Alert, Oriented to person, place, and time. Speech fluent. Follows simple and complex commands. Attention/concentration, recent and remote memory (including registration and recall), and fund of knowledge intact    Cranial nerves - PERRLA, blinks to threats b/l, EOMI, face sensation (V1-V3) intact b/l, facial strength intact without asymmetry b/l, hearing intact b/l, palate with symmetric elevation, trapezius 5/5 strength b/l, tongue midline on protrusion    Motor - Normal bulk. Mild drift in LUE. Initially no movement in LLE, although improved after few hours to anti-gravity    Sensation - diminished sensation to LT in LUE and LLE (intact on R)    Coordination - Finger to Nose intact b/l. No tremors appreciated      Labs:          CAPILLARY BLOOD GLUCOSE      POCT Blood Glucose.: 78 mg/dL (27 Apr 2024 21:00)          CSF:                  Radiology:  CT w/o contrast 4/27/24  IMPRESSION:  No acute intracranial hemorrhage or mass effect.

## 2024-04-27 NOTE — CONSULT NOTE ADULT - ATTENDING COMMENTS
HPI


Chief Complaint:  Musculoskeletal Complaint


Time Seen by Provider:  17:13


Travel History


International Travel<30 days:  No


Contact w/Intl Traveler<30days:  No


Traveled to known affect area:  No





History of Present Illness


HPI


65-year-old female that presents to the ED for evaluation of pain to her left 

hip with no injury.  Per patient is up and about 3 weeks ago.  Per patient she 

was deep sea fishing and laying on the side of both with no discomfort until 

the day after.  Per patient she is developing pain since.  No falls or 

injuries.  No prior surgeries or injuries.  Per patient the pain has been 

ongoing for the past 3 weeks and is not getting better.  She is taking some 

Lortab for her pain with no relief.  Denies any urinary or bowel movement 

issues.  No numbness or tingling.  Per patient the pain stays mainly on the 

left lateral hip and then moves to the buttocks.  Does not move anywhere else.  

Allergic to penicillin.





PFSH


Past Medical History


Hx Anticoagulant Therapy:  No


Arthritis:  No


Asthma:  No


Autoimmune Disease:  No


Anxiety:  Yes (Panic attacks)


Depression:  No


Heart Rhythm Problems:  Yes (AF)


Cancer:  No


Cardiac Catheterization:  Yes


Cardiovascular Problems:  Yes (HTN)


High Cholesterol:  Yes


Chemotherapy:  No


Chest Pain:  Yes


Congestive Heart Failure:  No


COPD:  Yes


Cerebrovascular Accident:  No


Coronary Artery Disease:  No


Diabetes:  No


Diminished Hearing:  No


Endocrine:  No


Gastrointestinal Disorders:  No


GERD:  No


Genitourinary:  No


Headaches:  No


Hiatal Hernia:  No


Hypertension:  Yes


Immune Disorder:  No


Implanted Vascular Access Dvce:  No


Kidney Stones:  No


Musculoskeletal:  Yes


Neurologic:  No


Psychiatric:  Yes (PTSD)


Reproductive:  Yes (HYSTERECTOMY)


Respiratory:  Yes (COPD)


Immunizations Current:  Yes


Migraines:  No


Myocardial Infarction:  Yes


Radiation Therapy:  No


Renal Failure:  No


Seizures:  No


Sickle Cell Disease:  No


Sleep Apnea:  No


Thyroid Disease:  No


Ulcer:  No


Tetanus Vaccination:  < 5 Years


Influenza Vaccination:  Yes


Pregnant?:  Not Pregnant


Menopausal:  Yes





Past Surgical History


Abdominal Surgery:  No


AICD:  No


Arteriovenous Shunt:  No


Cardiac Surgery:  No


Coronary Stent:  Yes (X's 2)


Ear Surgery:  No


Endocrine Surgery:  No


Eye Surgery:  No


Genitourinary Surgery:  No


Gynecologic Surgery:  Yes (HYSTERECTOMY)


Hysterectomy:  Yes


Insulin Pump:  No


Joint Replacement:  No


Neurologic Surgery:  No


Oral Surgery:  No


Pacemaker:  No


Thoracic Surgery:  No


Other Surgery:  Yes (Pilonidal cyst removal, nose recon. )





Social History


Alcohol Use:  Yes (Occ.)


Tobacco Use:  Yes (1PPD)


Substance Use:  No





Allergies-Medications


(Allergen,Severity, Reaction):  


Coded Allergies:  


     Penicillins (Verified  Allergy, Intermediate, RASH, 3/28/18)


     No Known Allergies (Unverified  Adverse Reaction, Unknown, 3/28/18)


Reported Meds & Prescriptions





Reported Meds & Active Scripts


Active


Diclofenac Sodium DR (Diclofenac Sodium) 75 Mg Tabdr 75 Mg PO BID PRN


Prednisone 20 Mg Tab 20 Mg PO BID 5 Days


Reported


Paroxetine (Paroxetine HCl) 10 Mg Tab 10 Mg PO BID


Losartan (Losartan Potassium) 50 Mg Tab 50 Mg PO DAILY


Hydrochlorothiazide 25 Mg Tab 25 Mg PO DAILY


Klor-Con 10 (Potassium Chloride) 10 Meq Tab 10 PO DAILY


Carvedilol 12.5 Mg Tab 12.5 Mg PO BID








Review of Systems


Except as stated in HPI:  all other systems reviewed are Neg





Physical Exam


Narrative


GENERAL: 


SKIN: Warm and dry.


HEAD: Atraumatic. Normocephalic. 


EYES: Pupils equal and round. No scleral icterus. No injection or drainage. 


ENT: No nasal bleeding or discharge.  Mucous membranes pink and moist.  Tongue 

is midline.  No uvula deviation.


NECK: Trachea midline. No JVD. 


CARDIOVASCULAR: Regular rate and rhythm.  


RESPIRATORY: No accessory muscle use. Clear to auscultation. Breath sounds 

equal bilaterally. 


GASTROINTESTINAL: Abdomen soft, non-tender, nondistended. Hepatic and splenic 

margins not palpable. 


MUSCULOSKELETAL: Extremities without clubbing, cyanosis, or edema. No obvious 

deformities.  Full range of motion of the upper and lower extremities 

bilaterally.  2+ pulses bilaterally.  Patient has a producible pain on the 

lateral aspect of the hip.  Point tenderness to palpation on the area of the 

bursa.  Full range of motion of the hip has pain with internal/external 

rotation.  No lumbar, thoracic, cervical spine tenderness to palpation.  Able 

to ambulate but with a limp.


NEUROLOGICAL: Awake and alert. No obvious cranial nerve deficits.  Motor 

grossly within normal limits. Five out of 5 muscle strength in the arms and 

legs.  Normal speech.


PSYCHIATRIC: Appropriate mood and affect; insight and judgment normal.





Data


Data


Last Documented VS





Vital Signs








  Date Time  Temp Pulse Resp B/P (MAP) Pulse Ox O2 Delivery O2 Flow Rate FiO2


 


3/28/18 16:30 99.3 83 16 163/76 (105) 98   








Orders





 Orders


Ketorolac Inj (Toradol Inj) (3/28/18 17:30)


Hip, Uni(Ap&Lat) W Ap Pelvis (3/28/18 )








MDM


Medical Decision Making


Medical Screen Exam Complete:  Yes


Emergency Medical Condition:  Yes


Medical Record Reviewed:  Yes


Interpretation(s)





Last Impressions








Hip and Pelvis X-Ray 3/28/18 0000 Signed





Impressions: 





 Service Date/Time:  Wednesday, March 28, 2018 17:28 - CONCLUSION:  

Unremarkable 





 exam for patient's age.     Logan Mann MD 








Differential Diagnosis


Acute on chronic pain versus chronic pain versus hip pain versus arthritis 

versus bursitis


Narrative Course


65-year-old female that presents to the ED for evaluation of hip pain.  Patient 

was properly examined and was found to have signs and symptoms consistent with 

musculoskeletal pain.  X-rays were done.  X-rays were negative for acute 

disease.  Minimal arthritis of any.  From where the patient is hurting in her 

symptoms this is likely bursitis.  We will treat with anti-inflammatories to 

help with pain.  Told to follow closely with PCP.  See ED worsening symptoms.





Diagnosis





 Primary Impression:  


 Hip bursitis, left


 Qualified Codes:  M70.62 - Trochanteric bursitis, left hip


Referrals:  


Portillo Menchaca MD, Todd Andrew MD Rhodes, J. Richard Richard MD


Patient Instructions:  General Instructions





***Additional Instructions:  


Take medications as prescribed.


Follow-up with PCP or orthopaedic doctor. 


See ED for any worsening symptoms.


Do not drink or drive while taking pain medication.


Apply ice or heat as needed for pain


***Med/Other Pt SpecificInfo:  Prescription(s) given


Scripts


Diclofenac Sodium  (Diclofenac Sodium DR) 75 Mg Tabdr


75 MG PO BID Y for PAIN SCALE 1 TO 10, #20 TAB 0 Refills


   Prov: Pedro Pablo Rodriguez MD         3/28/18 


Prednisone (Prednisone) 20 Mg Tab


20 MG PO BID for 5 Days, #10 TAB 0 Refills


   Prov: Pedro Pablo Rodriguez MD         3/28/18


Disposition:  01 DISCHARGE HOME


Condition:  Stable











Kennedy Mccarty Mar 28, 2018 17:53 Agree with assessment and plan per fellow note.  Current episode likely to be hemiparetic/hemiplegic migraine MRI brain demonstrates small infarction in the right frontal cortex, likely around the procedure  Continue treatment for migraine - will consult Headache team for recommendations   Continue antithrombotic treatment with aspirin and ticagrelor   Neurochecks every 4 hours

## 2024-04-27 NOTE — CONSULT NOTE ADULT - NSCONSULTADDITIONALINFOA_GEN_ALL_CORE
38 year old lady with hx of hemiplegic migraine (with left-sided weakness) and R SHA 5 mm aneurysm s/p flow diverter on 4/11. She presented with left-sided weakness LLE > LUE. NIHSS of 5. CTH/CTP/CTA were largely unremarkable. Flow diverter is patent with no stenosis. She is on ASA/Ticagrelor and compliant with it (PRU is 8). Hx and chart review are significant for two prior ED visits with HA and left-sided weakness - on one of the visits she had a complete plegia on the left-side. On February she had an MRI of the brain for those symptoms and that was normal.     Patient endorses severe HA and long-history of migraines, she tried "injections for headache" in the past, but those did not help. Her left side is still weaker with a drift, however, much improved in comparison to exam on admission. MR brain demonstrated a single 1 mm DWI restriction with ADC correlate in the right frontal cortex - that is likely related to the recent procedure and less likely explain the current presentation. Our impression is most likely hemiplegic migraine and we recommend abortive treatment with IVF, IV magnesium and compazine. It would be reasonable to start preventative rx with topiramate 50 mg HS. More comprehensive recommendations were provided to the neurosurgery team by our colleagues from general neurology.     Darrin Soto MD  Stroke Fellow

## 2024-04-27 NOTE — ED PROVIDER NOTE - ATTENDING CONTRIBUTION TO CARE
I was the supervising attending. I have independently seen face-to-face and examined the patient. I have reviewed the history and physical and discussed the MDM with the resident, fellow, PRADEEP and/or student. I agree with the assessment and plan as presented unless otherwise documented as follows:    38F hx migraines, HLD, R ICA aneurysm s/p stent placement 4/2024 presenting with L-sided weakness/numbness, chest pain, headache. At baseline fully functional, no problems w/ focal weakness, LKW approximately 5pm at which time had onset of symptoms. Additionally with sensation of difficulty breathing. Compliant with DAPT. Patient initially evaluated by myself at 2050, awake and alert, appears mildly uncomfortable. Exam notable for mild contracture of LUE with 3/5 strength & drift, 1/5 strength LLE, 5/5 strength RUE & RLE. Stroke code activated. DDx acute stroke vs. stent issue e.g. occlusion vs. intracranial dissection vs. other intracranial pathology. Expedited to CT, neurology at bedside, will follow lab/CT results and neurology recommendations. -Sol Snider MD (Attending)

## 2024-04-27 NOTE — ED PROVIDER NOTE - OBJECTIVE STATEMENT
38 year old female w/ hx of HLD and recent diagnosis PFO and R ICA aneurysm s/p stent placement in April 11, 2024 presenting for acute onset L-sided numbness and weakness since about 5pm during the day. She mentions that she had associated chest pain, trouble breathing, and a sensation of imbalance and warmth. She proceeded to call the police and was brought to the ED via ambulance. Was discharged on aspirin and brilinta and mentions that she took her AM dosages. No fever, chills, v/c/d. Does have nausea without episodes of vomiting.    Of note, her discharge paperwork indicates that she had full motor/sensation of her extremities on discharge. Prior to her symptom onset she mentions she had no issues with her extremities.

## 2024-04-27 NOTE — CONSULT NOTE ADULT - ASSESSMENT
38y F with Hx migraine, HLD, and R ICA aneurysm s/p stent (4/11/24), who presents w/ L-sided weakness, numbness and tingling    LKW: 5pm 5/27/24  NIHSS: 5  MRS: 0  Not tenecteplase candidate due to outside of time window  Not thrombectomy candidate due to []    Impression: acute onset L-sided numbness, weakness, and tingling    Recommendations: 38y F with Hx migraine, HLD, and R ICA aneurysm s/p stent (4/11/24), who presents w/ L-sided weakness, numbness and tingling    LKW: 5pm 5/27/24  NIHSS: 5  MRS: 0  Not tenecteplase candidate due to outside of time window  Not thrombectomy candidate due to lack of LVO    Impression: acute onset L-sided numbness, weakness, and tingling, accompanied by headache, blurry vision, and nausea. Possibly suggestive of complex migraine; would treat symptoms. R ICA stent patent upon CTA evaluation. Awaiting MRI.    Recommendations:  [] give migraine cocktail q8h PRN: Benadryl 25mg 1x, Toradol 15mg, Reglan 10mg 1x  [] give dose of 2g Mg 1x now  [] f/u MR brain w/o contrast  [] frequent neuro checks per ED protocol  [] neurosurgery following, appreciate recs, awaiting their full evaluation  [] will continue to follow    Case discussed w/ stroke fellow Dr. Soto. Will be formally staffed on AM rounds.

## 2024-04-27 NOTE — ED ADULT NURSE NOTE - OBJECTIVE STATEMENT
37 yo M AOx4 from home with PMH of patent foramen ovale, migranes, HDL, nonruptured cerebral aneursym & PSH of cerebral angiography, tubal ligation, umbilical hernia repair, , and c section delivery c/o left sided chest pain and decreased strength in left upper and lower extremities. Pt reports weakness in left arm and leg started at 1700. Code stroke activated, MD Snider at bedside. Pt taken to CT. Pt also endorses cp and sob. Pt recently had stent placed on  and was discharged with Brilinta and aspirin (took both medications this AM). On physical examination pt cannot left leg and pt has decreased strength and sensation in left hand compared it right hand.

## 2024-04-27 NOTE — ED ADULT NURSE NOTE - CHIEF COMPLAINT QUOTE
Patient with h/o Non ruptured cerebral aneurysm and had recent admission April 11 and dc 12th came here for SOB and chest pain with tingling sensation in the rt fingers started half hour ago. On blood thinner Brilinta

## 2024-04-27 NOTE — ED PROVIDER NOTE - PROGRESS NOTE DETAILS
Code stroke called. CT scans negative. Neurology and neurosurgery consulted for assistance in stroke workup. [facility] requested MRI results. Patient to be admitted to [facility] under Dr. Roe for observation/migraine treatment.

## 2024-04-27 NOTE — ED ADULT TRIAGE NOTE - CHIEF COMPLAINT QUOTE
no
Patient with h/o Non ruptured cerebral aneurysm and had recent admission April 11 and dc 12th came here for SOB and chest pain with tingling sensation in the rt fingers started half hour ago. On blood thinner Brilinta

## 2024-04-28 DIAGNOSIS — I63.9 CEREBRAL INFARCTION, UNSPECIFIED: ICD-10-CM

## 2024-04-28 LAB
PA ADP PRP-ACNC: 9 PRU — LOW (ref 194–417)
PLATELET RESPONSE ASPIRIN RESULT: 463 ARU — SIGNIFICANT CHANGE UP

## 2024-04-28 PROCEDURE — 99221 1ST HOSP IP/OBS SF/LOW 40: CPT | Mod: GC

## 2024-04-28 PROCEDURE — 70551 MRI BRAIN STEM W/O DYE: CPT | Mod: 26,MC

## 2024-04-28 RX ORDER — ASPIRIN/CALCIUM CARB/MAGNESIUM 324 MG
81 TABLET ORAL DAILY
Refills: 0 | Status: DISCONTINUED | OUTPATIENT
Start: 2024-04-28 | End: 2024-04-29

## 2024-04-28 RX ORDER — KETOROLAC TROMETHAMINE 30 MG/ML
30 SYRINGE (ML) INJECTION ONCE
Refills: 0 | Status: DISCONTINUED | OUTPATIENT
Start: 2024-04-28 | End: 2024-04-28

## 2024-04-28 RX ORDER — ACETAMINOPHEN 500 MG
650 TABLET ORAL EVERY 6 HOURS
Refills: 0 | Status: DISCONTINUED | OUTPATIENT
Start: 2024-04-28 | End: 2024-04-29

## 2024-04-28 RX ORDER — DEXAMETHASONE 0.5 MG/5ML
4 ELIXIR ORAL EVERY 12 HOURS
Refills: 0 | Status: DISCONTINUED | OUTPATIENT
Start: 2024-04-28 | End: 2024-04-29

## 2024-04-28 RX ORDER — TICAGRELOR 90 MG/1
90 TABLET ORAL ONCE
Refills: 0 | Status: COMPLETED | OUTPATIENT
Start: 2024-04-28 | End: 2024-04-28

## 2024-04-28 RX ORDER — POLYETHYLENE GLYCOL 3350 17 G/17G
17 POWDER, FOR SOLUTION ORAL DAILY
Refills: 0 | Status: DISCONTINUED | OUTPATIENT
Start: 2024-04-28 | End: 2024-04-29

## 2024-04-28 RX ORDER — METOCLOPRAMIDE HCL 10 MG
10 TABLET ORAL ONCE
Refills: 0 | Status: COMPLETED | OUTPATIENT
Start: 2024-04-28 | End: 2024-04-28

## 2024-04-28 RX ORDER — OXYCODONE HYDROCHLORIDE 5 MG/1
5 TABLET ORAL EVERY 4 HOURS
Refills: 0 | Status: DISCONTINUED | OUTPATIENT
Start: 2024-04-28 | End: 2024-04-29

## 2024-04-28 RX ORDER — ATORVASTATIN CALCIUM 80 MG/1
40 TABLET, FILM COATED ORAL AT BEDTIME
Refills: 0 | Status: DISCONTINUED | OUTPATIENT
Start: 2024-04-28 | End: 2024-04-29

## 2024-04-28 RX ORDER — MAGNESIUM SULFATE 500 MG/ML
1 VIAL (ML) INJECTION ONCE
Refills: 0 | Status: COMPLETED | OUTPATIENT
Start: 2024-04-28 | End: 2024-04-28

## 2024-04-28 RX ORDER — SODIUM CHLORIDE 9 MG/ML
1000 INJECTION INTRAMUSCULAR; INTRAVENOUS; SUBCUTANEOUS
Refills: 0 | Status: DISCONTINUED | OUTPATIENT
Start: 2024-04-28 | End: 2024-04-29

## 2024-04-28 RX ORDER — SODIUM CHLORIDE 9 MG/ML
1000 INJECTION INTRAMUSCULAR; INTRAVENOUS; SUBCUTANEOUS ONCE
Refills: 0 | Status: COMPLETED | OUTPATIENT
Start: 2024-04-28 | End: 2024-04-28

## 2024-04-28 RX ORDER — TICAGRELOR 90 MG/1
90 TABLET ORAL EVERY 12 HOURS
Refills: 0 | Status: DISCONTINUED | OUTPATIENT
Start: 2024-04-28 | End: 2024-04-29

## 2024-04-28 RX ORDER — SENNA PLUS 8.6 MG/1
2 TABLET ORAL AT BEDTIME
Refills: 0 | Status: DISCONTINUED | OUTPATIENT
Start: 2024-04-28 | End: 2024-04-29

## 2024-04-28 RX ADMIN — Medication 100 GRAM(S): at 02:22

## 2024-04-28 RX ADMIN — Medication 30 MILLIGRAM(S): at 01:32

## 2024-04-28 RX ADMIN — SODIUM CHLORIDE 1000 MILLILITER(S): 9 INJECTION INTRAMUSCULAR; INTRAVENOUS; SUBCUTANEOUS at 01:33

## 2024-04-28 RX ADMIN — TICAGRELOR 90 MILLIGRAM(S): 90 TABLET ORAL at 20:33

## 2024-04-28 RX ADMIN — ONDANSETRON 4 MILLIGRAM(S): 8 TABLET, FILM COATED ORAL at 00:06

## 2024-04-28 RX ADMIN — Medication 81 MILLIGRAM(S): at 11:25

## 2024-04-28 RX ADMIN — TICAGRELOR 90 MILLIGRAM(S): 90 TABLET ORAL at 00:48

## 2024-04-28 RX ADMIN — TICAGRELOR 90 MILLIGRAM(S): 90 TABLET ORAL at 07:45

## 2024-04-28 RX ADMIN — Medication 4 MILLIGRAM(S): at 17:15

## 2024-04-28 RX ADMIN — Medication 10 MILLIGRAM(S): at 01:32

## 2024-04-28 RX ADMIN — SODIUM CHLORIDE 75 MILLILITER(S): 9 INJECTION INTRAMUSCULAR; INTRAVENOUS; SUBCUTANEOUS at 04:44

## 2024-04-28 RX ADMIN — SODIUM CHLORIDE 1000 MILLILITER(S): 9 INJECTION INTRAMUSCULAR; INTRAVENOUS; SUBCUTANEOUS at 04:44

## 2024-04-28 RX ADMIN — ATORVASTATIN CALCIUM 40 MILLIGRAM(S): 80 TABLET, FILM COATED ORAL at 20:33

## 2024-04-28 RX ADMIN — Medication 400 MILLIGRAM(S): at 00:05

## 2024-04-28 RX ADMIN — SENNA PLUS 2 TABLET(S): 8.6 TABLET ORAL at 20:33

## 2024-04-28 RX ADMIN — POLYETHYLENE GLYCOL 3350 17 GRAM(S): 17 POWDER, FOR SOLUTION ORAL at 11:25

## 2024-04-28 RX ADMIN — Medication 4 MILLIGRAM(S): at 03:18

## 2024-04-28 NOTE — PHYSICAL THERAPY INITIAL EVALUATION ADULT - ADDITIONAL COMMENTS
Duplicate  
pt stated that she lives in an apartment was staying at her 's house prior to admission and will go back to his house after admission. pt states the house has 6 steps to enter with bilateral rails (far apart), 1 flight inside with 1 rail

## 2024-04-28 NOTE — PHYSICAL THERAPY INITIAL EVALUATION ADULT - PERTINENT HX OF CURRENT PROBLEM, REHAB EVAL
PMHx: completx migraine, HLD, brain aneurysm (s/p R ICA stent). presents with c/o of L-sided numbness/weakness. A few weeks ago, she presented with L-sided tingling and was diagnosed w/ R ICA superior hypophyseal aneurysm. Received stent with Dr Roe on 4/11/24 and was discharged next day. She has been taking aspirin and Brilinta (last dose this AM). Was in her usual state of health until 5pm today, when developed CP, SOB, & L-sided numbness, weakness, & tingling. symptoms have improved while in the ED. She also notes a pounding headache that starts on the R temple & spreads across the forehead. She endorses blurry vision & nausea. She typically has headaches every other day. CTH: No acute ICH or mass effect. Not tenecteplase candidate due to outside of time window. Not thrombectomy candidate due to lack of LVO. MRI: Tiny subtle focus of restricted diffusion in the right posterior frontal lobe compatible with acute microinfarct.    CTP: No core infarct or ischemic penumbra.  CTA COW:  Patent intracranial circulation without flow limiting stenosis. Patent right ICA stent. No evidence for residual or recurrent aneurysm.  CTA NECK: Patent, ECAs, ICAs, no  hemodynamically significant stenosis at ICA origins by NASCET criteria. Bilateral vertebral arteries are patent without flow limiting stenosis.

## 2024-04-28 NOTE — PROGRESS NOTE ADULT - SUBJECTIVE AND OBJECTIVE BOX
Patient seen and examined at bedside.    --Anticoagulation--  aspirin  chewable 81 milliGRAM(s) Oral daily  ticagrelor 90 milliGRAM(s) Oral every 12 hours    T(C): 36.4 (04-28-24 @ 08:53), Max: 37.1 (04-27-24 @ 17:45)  HR: 84 (04-28-24 @ 08:53) (52 - 84)  BP: 107/72 (04-28-24 @ 08:53) (95/60 - 124/96)  RR: 18 (04-28-24 @ 08:53) (18 - 19)  SpO2: 98% (04-28-24 @ 08:53) (98% - 100%)  Wt(kg): --                          11.5   5.20  )-----------( 348      ( 27 Apr 2024 21:50 )             38.2   04-27    134<L>  |  100  |  14  ----------------------------<  89  4.4   |  23  |  0.82    Ca    8.8      27 Apr 2024 21:50  Mg     2.0     04-27    TPro  7.0  /  Alb  4.2  /  TBili  0.3  /  DBili  x   /  AST  22  /  ALT  21  /  AlkPhos  59  04-27      Exam:  Sleepy/photophobic, Ox3, PERRL, EOMI, no facial, no facial numbness, LUE +drift, 4-/5, LLE 3/5 prox, 2/5 distally, RUE/RLE 4+-5/5, +LUE and LLE numbness.

## 2024-04-28 NOTE — CHART NOTE - NSCHARTNOTEFT_GEN_A_CORE
Case d/w ED, iso recent intracranial stent, please obtain STAT P2Y12 and Platelet Response Aspirin Levels now (ordered) and get emergent MRI DWI/FLAIR (ordered) to r/o in-stent thrombosis.   Full consult to follow pending MRI.
Interim events:  General neurology team contacted for additional headache management.    Additional recommendations:  [] try migraine cocktail if no medical contraindications as follows -> benadryl 25 IV, compazine 10mg IV, toradol 30 IV, magnesium 1g   [] 2nd line (please give several hours for above regimen to take effect): depakote 500 mg IV 1x (caution in pregnant pts, liver dysfunction, mitochondrial disorders)  [] 3rd line: Solumedrol 250mg IV x1.     Discussed with neurology attending on call.  Thank you.

## 2024-04-28 NOTE — H&P ADULT - NSHPPHYSICALEXAM_GEN_ALL_CORE
Exam: Severe throbbing HA, nausea, Sleepy/photophobic, Ox3, PERRL, EOMI, no facial, no facial numbness, LUE +drift, 4-/5, LLE 3/5 prox, 2/5 distally, RUE/RLE 4+-5/5, +LUE and LLE numbness.

## 2024-04-28 NOTE — PROGRESS NOTE ADULT - ASSESSMENT
38F hx complex migraines recently s/p R ICA Kailash X stent for sup hyp aneurysm (elective), dced on ASA81/[id number] was therapeutic, p/w code stroke for Lt sided numbness/weakness LKW 5pm, NIHSS 5. CTH neg, CTA/CTP neg-GEENA stent looks open through entire cavernous-->supraclinoid seg, got emergent MRI w/ no strokes. ARU PRU 9.   - suspect complex migrane  - pt has hx of complex migraines w/ L-sided numbness, this presentation is similar to previous  - ARU/PRU therapeutic, 463/9, cont ASA81/Bril 90BID  - fu neurology consult

## 2024-04-28 NOTE — H&P ADULT - ASSESSMENT
ANDREZ HENRY  38F Link pt hx complex migraines recently s/p R ICA Kailash X stent for sup hyp aneurysm (elective), dced on ASA81/[id number] was therapeutic, p/w code stroke for Lt sided numbness/weakness LKW 5pm, NIHSS 5. CTH neg, CTA/CTP neg-GEENA stent looks open through entire cavernous-->supraclinoid seg, got emergent MRI w/ no strokes. ARU PRU 9. SBP 110s. Exam: Severe throbbing HA, Sleepy/photophobic, Ox3, PERRL, EOMI, no facial, no facial numbness, LUE +drift, 4-/5, LLE 3/5 prox, 2/5 distally, RUE/RLE 4+-5/5, +LUE and LLE numbness.   - suspect complex migrane>>>>stent issue, reviewed imaging w/ IR fellow  - pt has hx of complex migraines w/ L-sided numbness, this presentation is similar to previous  - ARU/PRU therapeutic, 463/9, cont ASA81/Bril 90BID  - ADM 4C for observation, poss DC in AM  - will give migrane cocktail/fluids  - fu neurology consult in AM

## 2024-04-28 NOTE — H&P ADULT - ATTENDING COMMENTS
Patient well known to me s/p FREDX embo of unruptured R ICA aneurysm, now with L sided weakness. CTA shows patency of stent, MRI negative for infarct. She has a history of complex migraine that presented similar in the past.

## 2024-04-28 NOTE — PHYSICAL THERAPY INITIAL EVALUATION ADULT - THERAPY FREQUENCY, PT EVAL
"Routing refill request to provider for review/approval because:  Blood pressure failed    Last Written Prescription Date:  7/19/2022  Last Fill Quantity: 90,  # refills: 3   Last office visit provider:  2/15/2023     Requested Prescriptions   Pending Prescriptions Disp Refills    losartan (COZAAR) 25 MG tablet [Pharmacy Med Name: LOSARTAN POTASSIUM 25 MG TAB] 90 tablet 3     Sig: TAKE 1 TABLET BY MOUTH EVERY DAY       Angiotensin-II Receptors Failed - 7/26/2023 12:35 AM        Failed - Last blood pressure under 140/90 in past 12 months     BP Readings from Last 3 Encounters:   07/19/23 (!) 163/84   02/15/23 135/75   11/21/22 (!) 178/104                 Passed - Recent (12 mo) or future (30 days) visit within the authorizing provider's specialty     Patient has had an office visit with the authorizing provider or a provider within the authorizing providers department within the previous 12 mos or has a future within next 30 days. See \"Patient Info\" tab in inbasket, or \"Choose Columns\" in Meds & Orders section of the refill encounter.              Passed - Medication is active on med list        Passed - Patient is age 18 or older        Passed - No active pregnancy on record        Passed - Normal serum creatinine on file in past 12 months     Recent Labs   Lab Test 02/15/23  1557   CR 0.64       Ok to refill medication if creatinine is low          Passed - Normal serum potassium on file in past 12 months     Recent Labs   Lab Test 02/15/23  1557   POTASSIUM 4.3                    Passed - No positive pregnancy test in past 12 months             Bre Vann RN 07/26/23 1:56 PM  "
2-3x/week

## 2024-04-28 NOTE — H&P ADULT - HISTORY OF PRESENT ILLNESS
38F Link pt hx complex migraines recently s/p R ICA Kailash X stent for sup hyp aneurysm (elective), dced on ASA81/[id number] was therapeutic, p/w code stroke for Lt sided numbness/weakness LKW 5pm, NIHSS 5. CTH neg, CTA/CTP neg-GEENA stent looks open through entire cavernous-->supraclinoid seg, got emergent MRI w/ no strokes. ARU PRU 9. SBP 110s. Exam: Severe throbbing HA, nausea, Sleepy/photophobic, Ox3, PERRL, EOMI, no facial, no facial numbness, LUE +drift, 4-/5, LLE 3/5 prox, 2/5 distally, RUE/RLE 4+-5/5, +LUE and LLE numbness.

## 2024-04-28 NOTE — PHYSICAL THERAPY INITIAL EVALUATION ADULT - ASR WT BEARING STATUS EVAL
4/22/19: Patient's labs and history reviewed with Dr. Bey given drop in hemoglobin. Per Dr. Bey, will add peripheral smear, retic, LDH, Coleman/TANMAY and iron studies to labs on Thurs. If patient had redder cheeks with otitis, Dr. Bey would also recommend sending parvo IgG, IgM and PCR.     Followed-up with lAexis to review above recommendations and to check on patient's status. Also, asked if patient could have labs drawn at a [facility] clinic.     Alexis noted they did not notice Fortunato's cheeks being redder than usual with his ear infection. Alexis also reported Fortunato complained about right foot and fingers getting numb and tingly on Saturday after his cath on Fri. Alexis reports Fortunato still was limping a little on Sunday but by Monday he was okay and went to school.     Per Dr. Santamaria, instructed Alexis to call the transplant office if right foot numbness comes back. Otherwise, Dr. Santamaria recommended still getting parvo IgM and PCR to verify if patient had recent parvovirus which could be why his hgb is down. Alexis communicated understanding and will have labs drawn 4/25/19 at the [facility] clinic at 11:50 AM.     4/29/19: Alexis updated on negative parvovirus labs. Per Dr. Santamaria, low hgb likely due to viral infection. Will plan to get repeat labs per transplant routine (every 3 months) in June at [facility]     Jennifer Madison, RN, BSN  Pediatric Heart Transplant, Heart Failure, and VAD Coordinator  [facility]  Phone: [phone]  Pager: [phone]  Heart Transplant Coordinator On-Call: [phone] Job Code Pager [phone]      no weight-bearing restrictions

## 2024-04-29 ENCOUNTER — TRANSCRIPTION ENCOUNTER (OUTPATIENT)
Age: 39
End: 2024-04-29

## 2024-04-29 ENCOUNTER — NON-APPOINTMENT (OUTPATIENT)
Age: 39
End: 2024-04-29

## 2024-04-29 VITALS
HEART RATE: 69 BPM | TEMPERATURE: 98 F | DIASTOLIC BLOOD PRESSURE: 58 MMHG | OXYGEN SATURATION: 97 % | SYSTOLIC BLOOD PRESSURE: 105 MMHG | RESPIRATION RATE: 18 BRPM

## 2024-04-29 LAB
ALBUMIN SERPL ELPH-MCNC: 3.8 G/DL — SIGNIFICANT CHANGE UP (ref 3.3–5)
ALP SERPL-CCNC: 52 U/L — SIGNIFICANT CHANGE UP (ref 40–120)
ALT FLD-CCNC: 18 U/L — SIGNIFICANT CHANGE UP (ref 10–45)
ANION GAP SERPL CALC-SCNC: 12 MMOL/L — SIGNIFICANT CHANGE UP (ref 5–17)
AST SERPL-CCNC: 12 U/L — SIGNIFICANT CHANGE UP (ref 10–40)
BILIRUB SERPL-MCNC: 0.3 MG/DL — SIGNIFICANT CHANGE UP (ref 0.2–1.2)
BUN SERPL-MCNC: 10 MG/DL — SIGNIFICANT CHANGE UP (ref 7–23)
CALCIUM SERPL-MCNC: 8.9 MG/DL — SIGNIFICANT CHANGE UP (ref 8.4–10.5)
CHLORIDE SERPL-SCNC: 107 MMOL/L — SIGNIFICANT CHANGE UP (ref 96–108)
CO2 SERPL-SCNC: 20 MMOL/L — LOW (ref 22–31)
CREAT SERPL-MCNC: 0.77 MG/DL — SIGNIFICANT CHANGE UP (ref 0.5–1.3)
EGFR: 100 ML/MIN/1.73M2 — SIGNIFICANT CHANGE UP
GLUCOSE SERPL-MCNC: 146 MG/DL — HIGH (ref 70–99)
HCT VFR BLD CALC: 33.7 % — LOW (ref 34.5–45)
HGB BLD-MCNC: 10.5 G/DL — LOW (ref 11.5–15.5)
MAGNESIUM SERPL-MCNC: 2.2 MG/DL — SIGNIFICANT CHANGE UP (ref 1.6–2.6)
MCHC RBC-ENTMCNC: 25.2 PG — LOW (ref 27–34)
MCHC RBC-ENTMCNC: 31.2 GM/DL — LOW (ref 32–36)
MCV RBC AUTO: 81 FL — SIGNIFICANT CHANGE UP (ref 80–100)
NRBC # BLD: 0 /100 WBCS — SIGNIFICANT CHANGE UP (ref 0–0)
PHOSPHATE SERPL-MCNC: 2.7 MG/DL — SIGNIFICANT CHANGE UP (ref 2.5–4.5)
PLATELET # BLD AUTO: 332 K/UL — SIGNIFICANT CHANGE UP (ref 150–400)
POTASSIUM SERPL-MCNC: 4.2 MMOL/L — SIGNIFICANT CHANGE UP (ref 3.5–5.3)
POTASSIUM SERPL-SCNC: 4.2 MMOL/L — SIGNIFICANT CHANGE UP (ref 3.5–5.3)
PROT SERPL-MCNC: 6.5 G/DL — SIGNIFICANT CHANGE UP (ref 6–8.3)
RBC # BLD: 4.16 M/UL — SIGNIFICANT CHANGE UP (ref 3.8–5.2)
RBC # FLD: 15 % — HIGH (ref 10.3–14.5)
SODIUM SERPL-SCNC: 139 MMOL/L — SIGNIFICANT CHANGE UP (ref 135–145)
WBC # BLD: 15.36 K/UL — HIGH (ref 3.8–10.5)
WBC # FLD AUTO: 15.36 K/UL — HIGH (ref 3.8–10.5)

## 2024-04-29 PROCEDURE — 84100 ASSAY OF PHOSPHORUS: CPT

## 2024-04-29 PROCEDURE — 97161 PT EVAL LOW COMPLEX 20 MIN: CPT

## 2024-04-29 PROCEDURE — 86901 BLOOD TYPING SEROLOGIC RH(D): CPT

## 2024-04-29 PROCEDURE — 85025 COMPLETE CBC W/AUTO DIFF WBC: CPT

## 2024-04-29 PROCEDURE — 84702 CHORIONIC GONADOTROPIN TEST: CPT

## 2024-04-29 PROCEDURE — 97166 OT EVAL MOD COMPLEX 45 MIN: CPT

## 2024-04-29 PROCEDURE — 84132 ASSAY OF SERUM POTASSIUM: CPT

## 2024-04-29 PROCEDURE — 82435 ASSAY OF BLOOD CHLORIDE: CPT

## 2024-04-29 PROCEDURE — 96374 THER/PROPH/DIAG INJ IV PUSH: CPT

## 2024-04-29 PROCEDURE — 83735 ASSAY OF MAGNESIUM: CPT

## 2024-04-29 PROCEDURE — 84484 ASSAY OF TROPONIN QUANT: CPT

## 2024-04-29 PROCEDURE — 85018 HEMOGLOBIN: CPT

## 2024-04-29 PROCEDURE — 70450 CT HEAD/BRAIN W/O DYE: CPT | Mod: MC

## 2024-04-29 PROCEDURE — 70496 CT ANGIOGRAPHY HEAD: CPT | Mod: MC

## 2024-04-29 PROCEDURE — 71045 X-RAY EXAM CHEST 1 VIEW: CPT

## 2024-04-29 PROCEDURE — 82803 BLOOD GASES ANY COMBINATION: CPT

## 2024-04-29 PROCEDURE — 86850 RBC ANTIBODY SCREEN: CPT

## 2024-04-29 PROCEDURE — 85610 PROTHROMBIN TIME: CPT

## 2024-04-29 PROCEDURE — 70551 MRI BRAIN STEM W/O DYE: CPT | Mod: MC

## 2024-04-29 PROCEDURE — 82962 GLUCOSE BLOOD TEST: CPT

## 2024-04-29 PROCEDURE — 96375 TX/PRO/DX INJ NEW DRUG ADDON: CPT

## 2024-04-29 PROCEDURE — 82947 ASSAY GLUCOSE BLOOD QUANT: CPT

## 2024-04-29 PROCEDURE — 85576 BLOOD PLATELET AGGREGATION: CPT

## 2024-04-29 PROCEDURE — 86900 BLOOD TYPING SEROLOGIC ABO: CPT

## 2024-04-29 PROCEDURE — 99291 CRITICAL CARE FIRST HOUR: CPT | Mod: 25

## 2024-04-29 PROCEDURE — 82330 ASSAY OF CALCIUM: CPT

## 2024-04-29 PROCEDURE — 99232 SBSQ HOSP IP/OBS MODERATE 35: CPT

## 2024-04-29 PROCEDURE — 84295 ASSAY OF SERUM SODIUM: CPT

## 2024-04-29 PROCEDURE — 70498 CT ANGIOGRAPHY NECK: CPT | Mod: MC

## 2024-04-29 PROCEDURE — 0042T: CPT | Mod: MC

## 2024-04-29 PROCEDURE — 93005 ELECTROCARDIOGRAM TRACING: CPT

## 2024-04-29 PROCEDURE — 85730 THROMBOPLASTIN TIME PARTIAL: CPT

## 2024-04-29 PROCEDURE — 85027 COMPLETE CBC AUTOMATED: CPT

## 2024-04-29 PROCEDURE — 83605 ASSAY OF LACTIC ACID: CPT

## 2024-04-29 PROCEDURE — G0378: CPT

## 2024-04-29 PROCEDURE — 80053 COMPREHEN METABOLIC PANEL: CPT

## 2024-04-29 PROCEDURE — 85014 HEMATOCRIT: CPT

## 2024-04-29 RX ORDER — MAGNESIUM SULFATE 500 MG/ML
1 VIAL (ML) INJECTION ONCE
Refills: 0 | Status: COMPLETED | OUTPATIENT
Start: 2024-04-29 | End: 2024-04-29

## 2024-04-29 RX ORDER — KETOROLAC TROMETHAMINE 30 MG/ML
30 SYRINGE (ML) INJECTION ONCE
Refills: 0 | Status: DISCONTINUED | OUTPATIENT
Start: 2024-04-29 | End: 2024-04-29

## 2024-04-29 RX ORDER — PROCHLORPERAZINE MALEATE 5 MG
10 TABLET ORAL ONCE
Refills: 0 | Status: COMPLETED | OUTPATIENT
Start: 2024-04-29 | End: 2024-04-29

## 2024-04-29 RX ORDER — SENNA PLUS 8.6 MG/1
2 TABLET ORAL
Qty: 0 | Refills: 0 | DISCHARGE
Start: 2024-04-29

## 2024-04-29 RX ORDER — POLYETHYLENE GLYCOL 3350 17 G/17G
17 POWDER, FOR SOLUTION ORAL
Qty: 0 | Refills: 0 | DISCHARGE
Start: 2024-04-29

## 2024-04-29 RX ORDER — DEXAMETHASONE 0.5 MG/5ML
1 ELIXIR ORAL
Qty: 6 | Refills: 0
Start: 2024-04-29 | End: 2024-04-30

## 2024-04-29 RX ORDER — DIPHENHYDRAMINE HCL 50 MG
25 CAPSULE ORAL ONCE
Refills: 0 | Status: COMPLETED | OUTPATIENT
Start: 2024-04-29 | End: 2024-04-29

## 2024-04-29 RX ADMIN — Medication 4 MILLIGRAM(S): at 05:07

## 2024-04-29 RX ADMIN — Medication 25 MILLIGRAM(S): at 01:05

## 2024-04-29 RX ADMIN — Medication 10 MILLIGRAM(S): at 01:05

## 2024-04-29 RX ADMIN — Medication 81 MILLIGRAM(S): at 11:25

## 2024-04-29 RX ADMIN — TICAGRELOR 90 MILLIGRAM(S): 90 TABLET ORAL at 07:37

## 2024-04-29 RX ADMIN — Medication 30 MILLIGRAM(S): at 01:05

## 2024-04-29 RX ADMIN — Medication 100 GRAM(S): at 01:06

## 2024-04-29 NOTE — OCCUPATIONAL THERAPY INITIAL EVALUATION ADULT - FINE MOTOR COORDINATION, LEFT HAND, FINGER TO NOSE, OT EVAL
7/6/2021 11:32 AM [employer] approved pt for private pay home O2. CM confirmed with Zechariah at [employer], pt's O2 can be rented on a month by month basis at $105 fee for home equipment and an additional $10 per tank. CM relayed this cost to pt and pt's wife, both were agreeable. CM also relayed to [facility] at [employer] [facility] pt will need an adapter for her CPAP to bleed in O2. [facility] reported this will be delivered with pt's home set up. O2 tank delivered to pt. Signed form sent to [employer] via [employer]. Pt's wife will transport pt home. Pulm to arrange a virtual visit for a one week follow up for pt.    7/6/2021 9:17 AM Walking oximetry test noted, pt will need 2L of O2. Met with pt at bedside with Pulmonologist, confirmed need for home O2. Pt reported her CPAP is through [employer] and this would be preferred provider. CM discussed with Pulmonologist pt does not have a qualifying diagnosis for home O2 under Medicare as she does not have a chronic diagnosis. Pt is understanding of this and that O2 at home would be an out of pocket cost.   CM sent home O2 order and walking oximetry test result to [employer] via [employer]. CM requested to know pricing for pt for home O2 out of pocket. CM will follow up. 7/6/2021 7:59 AM Anticipating discharge today. CM called and spoke with RT relayed need for walking oximetry today. Preliminary O2 referral sent to [employer] on 7/5. Following for walking oximetry test results. JANNETTE Vallecillo     Transition of care plan:  1. POD 6 appendectomy, requiring 2L of supplemental O2.  2. Walking oximetry ordered on 7/5, completed today and pt needing 2L of O2  3. Home O2 referral pending with [employer]  4. Home with family at discharge  5. Outpatient follow-up. 6. Pt's family to transport    Care Management Interventions  PCP Verified by CM:  Yes Hayde Harris DO)  Mode of Transport at Discharge: Self  Discharge Durable Medical Equipment: Yes (Home O2 )  Current Support Network: Lives with Spouse  Confirm Follow Up Transport: Family  The Plan for Transition of Care is Related to the Following Treatment Goals : perferated appendicitis  Discharge Location  Discharge Placement: Home with family assistance mild impairment

## 2024-04-29 NOTE — DISCHARGE NOTE PROVIDER - NSDCFUADDAPPT_GEN_ALL_CORE_FT
Please make an appointment to see Dr Urias ( neurology) for follow up regarding your migraine headaches    APPTS ARE READY TO BE MADE: [x] YES    Best Family or Patient Contact (if needed):     Additional Information about above appointments (if needed):    1:  Dr Bridgett Estrada  2:   3:     Other comments or requests:    Please make an appointment to see Dr Urias ( neurology) for follow up regarding your migraine headaches    APPTS ARE READY TO BE MADE: [x] YES    Best Family or Patient Contact (if needed):     Additional Information about above appointments (if needed):    1:  Dr Bridgett Estrada  2:   3:     Other comments or requests:     Prior to outreaching the patient, it was visible that the patient has secured a follow up appointment which was not scheduled by our team. Patient is scheduled to see Dr. Roe on 5/15 at [address] [place] [id number] Please make an appointment to see Dr Urias ( neurology) for follow up regarding your migraine headaches    APPTS ARE READY TO BE MADE: [x] YES    Best Family or Patient Contact (if needed):     Additional Information about above appointments (if needed):    1:  Dr Bridgett Estrada  2:   3:     Other comments or requests:     Appointment was scheduled but is not visible on [employer]. Patient is scheduled to see Dr. Urias at 2:45PM on 5/22 at [address] [place] [address]    Prior to outreaching the patient, it was visible that the patient has secured a follow up appointment which was not scheduled by our team. Patient is scheduled to see Dr. Roe on 5/15 at [address] [place] [address]

## 2024-04-29 NOTE — DISCHARGE NOTE NURSING/CASE MANAGEMENT/SOCIAL WORK - PATIENT PORTAL LINK FT
You can access the FollowMyHealth Patient Portal offered by [facility] by registering at the following website: [email or web address]. By joining [facility]’s FollowMyHealth portal, you will also be able to view your health information using other applications (apps) compatible with our system.

## 2024-04-29 NOTE — OCCUPATIONAL THERAPY INITIAL EVALUATION ADULT - DIAGNOSIS, OT EVAL
Patient presents with decreased coordination, balance, strength, endurance impacting ability to perform ADLs and functional mobility

## 2024-04-29 NOTE — DISCHARGE NOTE PROVIDER - NSDCFUSCHEDAPPT_GEN_ALL_CORE_FT
[facility]  DERM [address]  Scheduled Appointment: 05/13/2024     [facility]  [facility]  DERM [address]  Scheduled Appointment: 05/13/2024    Gordon Roe  [facility]  NEUROSURG [address] [facility]  Scheduled Appointment: 07/17/2024     [facility]  [facility] [address]  Scheduled Appointment: 05/13/2024    Gordon Roe  [facility]  NEUROSURG [address] [place] [address]  Scheduled Appointment: 05/15/2024    [facility]  NEUROSURG [address] [place] [address]  Scheduled Appointment: 07/17/2024

## 2024-04-29 NOTE — PROGRESS NOTE ADULT - ASSESSMENT
38F hx complex migraines recently s/p R ICA Kailash X stent for sup hyp aneurysm (elective), dced on ASA81/Hsrb20LQK was therapeutic, p/w code stroke for Lt sided numbness/weakness LKW 5pm, NIHSS 5. CTH neg, CTA/CTP neg-GEENA stent looks open through entire cavernous-->supraclinoid seg, got emergent MRI w/ no strokes. ARU PRU 9.   - suspect complex migrane  - pt has hx of complex migraines w/ L-sided numbness, this presentation is similar to previous  - ARU/PRU therapeutic, 463/9, cont ASA81/Bril 90BID  - fu neurology consult          PLAN:  NEURO:  CARDIOVASCULAR:  PULMONARY:  RENAL:  GI:  HEME:  ID:  ENDO:    DVT PROPHYLAXIS:  [] Venodynes                                [] Heparin/Lovenox    FALL RISK:  [] Low Risk                                    [] Impulsive 38F hx complex migraines recently s/p R ICA Kailash X stent for sup hyp aneurysm (elective), dced on ASA81/[id number] was therapeutic, p/w code stroke for Lt sided numbness/weakness LKW 5pm, NIHSS 5. CTH neg, CTA/CTP neg-GENEA stent looks open through entire cavernous-->supraclinoid seg, got emergent MRI w/ no strokes. ARU PRU 9.   - suspect complex migrane    Pt has hx of complex migraines w/ L-sided numbness, this presentation is similar to previous    PLAN    NEURO  -C/w neurochecks q 4 hrs  - ARU/PRU therapeutic, 463/9, cont ASA81/Bril 90BID  -Decadron over 24 hours  - fu neurology consult appreciated  -PT/OT  -D/c planning     CARDIOVASCULAR:  hemodynamically stable    PULMONARY:  Satting >96% on RA    RENAL:  IVL  Voiding    GI:  Regular diet  Bowel regimen: senna, miralax    HEME:  H/H stable    ID: Afebrile    ENDO: no issue    DVT PROPHYLAXIS:  [x] Venodynes                                [] Heparin/Lovenox    FALL RISK:  x] Low Risk                                    [] Impulsive    Dispo: Home with home OT/PT    Will discuss with Dr Roe  [id number] 38F hx complex migraines recently s/p R ICA Kailash X stent for sup hyp aneurysm (elective), dced on ASA81/Touf53MMN was therapeutic, p/w code stroke for Lt sided numbness/weakness LKW 5pm, NIHSS 5. CTH neg, CTA/CTP neg-GEENA stent looks open through entire cavernous-->supraclinoid seg, got emergent MRI w/ no strokes. ARU PRU 9.   - suspect complex migrane    Pt has hx of complex migraines w/ L-sided numbness, this presentation is similar to previous    PLAN    NEURO  -C/w neuro checks q 4 hrs  - ARU/PRU therapeutic, 463/9, cont ASA81/Bril 90BID  -Decadron over 24 hours  - fu neurology consult appreciated  -PT/OT  -D/c planning     CARDIOVASCULAR:  hemodynamically stable    PULMONARY:  Satting >96% on RA    RENAL:  IVL  Voiding    GI:  Regular diet  Bowel regimen: senna, miralax    HEME:  H/H stable    ID: Afebrile    ENDO: no issue    DVT PROPHYLAXIS:  [x] Venodynes                                [] Heparin/Lovenox    FALL RISK:  x] Low Risk                                    [] Impulsive    Dispo: Home with home OT/PT    Will discuss with Dr Roe  [id number]

## 2024-04-29 NOTE — DISCHARGE NOTE PROVIDER - NSDCMRMEDTOKEN_GEN_ALL_CORE_FT
acetaminophen 325 mg oral tablet: 2 tab(s) orally every 6 hours as needed for  mild pain  aspirin 81 mg oral delayed release tablet: 1 tab(s) orally once a day  atorvastatin 40 mg oral tablet: 1 tab(s) orally once a day (at bedtime)  Brilinta (ticagrelor) 90 mg oral tablet: 1 tab(s) orally 2 times a day  polyethylene glycol 3350 oral powder for reconstitution: 17 gram(s) orally once a day  senna leaf extract oral tablet: 2 tab(s) orally once a day (at bedtime)   acetaminophen 325 mg oral tablet: 2 tab(s) orally every 6 hours as needed for  mild pain  aspirin 81 mg oral delayed release tablet: 1 tab(s) orally once a day  atorvastatin 40 mg oral tablet: 1 tab(s) orally once a day (at bedtime)  Brilinta (ticagrelor) 90 mg oral tablet: 1 tab(s) orally 2 times a day  dexAMETHasone 4 mg oral tablet: 1 tab(s) orally 2 times a day x 2 days then 2 mg daily x 2 days then d/c  polyethylene glycol 3350 oral powder for reconstitution: 17 gram(s) orally once a day  senna leaf extract oral tablet: 2 tab(s) orally once a day (at bedtime)   acetaminophen 325 mg oral tablet: 2 tab(s) orally every 6 hours as needed for  mild pain  aspirin 81 mg oral delayed release tablet: 1 tab(s) orally once a day  atorvastatin 40 mg oral tablet: 1 tab(s) orally once a day (at bedtime)  Brilinta (ticagrelor) 90 mg oral tablet: 1 tab(s) orally 2 times a day  dexAMETHasone 4 mg oral tablet: 1 tab(s) orally 2 times a day x 2 days then 2 mg daily x 2 days then d/c  Patient is s/p GEENA stent on 4/11 readmitted with Lt sided weakness: Pt needs a rolling walker for ambulation  polyethylene glycol 3350 oral powder for reconstitution: 17 gram(s) orally once a day  senna leaf extract oral tablet: 2 tab(s) orally once a day (at bedtime)

## 2024-04-29 NOTE — OCCUPATIONAL THERAPY INITIAL EVALUATION ADULT - PERTINENT HX OF CURRENT PROBLEM, REHAB EVAL
38F Link pt hx complex migraines recently s/p R ICA Kailash X stent for sup hyp aneurysm (elective), dced on ASA81/[id number] was therapeutic, p/w code stroke for Lt sided numbness/weakness LKW 5pm, NIHSS 5. CTH neg, CTA/CTP neg-GEENA stent looks open through entire cavernous-->supraclinoid seg, got emergent MRI w/ no strokes. ARU PRU 9. SBP 110s. Exam: Severe throbbing HA, nausea, Sleepy/photophobic, Ox3, PERRL, EOMI, no facial, no facial numbness, LUE +drift, 4-/5, LLE 3/5 prox, 2/5 distally, RUE/RLE 4+-5/5, +LUE and LLE numbness.  MR Head 4/28 Tiny subtle focus of restricted diffusion in the right posterior frontal lobe compatible with acute microinfarct.

## 2024-04-29 NOTE — DISCHARGE NOTE NURSING/CASE MANAGEMENT/SOCIAL WORK - NSDCFUADDAPPT_GEN_ALL_CORE_FT
Please make an appointment to see Dr Urias ( neurology) for follow up regarding your migraine headaches    APPTS ARE READY TO BE MADE: [x] YES    Best Family or Patient Contact (if needed):     Additional Information about above appointments (if needed):    1:  Dr Bridgett Estrada  2:   3:     Other comments or requests:

## 2024-04-29 NOTE — DISCHARGE NOTE PROVIDER - CARE PROVIDERS DIRECT ADDRESSES
[email or web address]

## 2024-04-29 NOTE — DISCHARGE NOTE PROVIDER - HOSPITAL COURSE
38F hx complex migraines recently s/p R ICA Kailash X stent for sup hyp aneurysm (elective), dced on ASA81/Pinw88IWM was therapeutic, p/w code stroke for Lt sided numbness/weakness LKW 5pm, NIHSS 5. CTH neg, CTA/CTP neg-GEENA stent looks open through entire cavernous-->supraclinoid seg, got emergent MRI w/ no strokes. ARU PRU 9.   Pt has hx of complex migraines w/ L-sided numbness, this presentation is similar to previous. She was seen by  neurology and was treated with a migraine cocktail: Benadryl/compazine and Toradol. her Lt sided weakness has improved and she is no longer c/o migraine headaches . She was seen by PT/OT and home PT/OT recommended. Her condition is stable for discharge home today

## 2024-04-29 NOTE — DISCHARGE NOTE NURSING/CASE MANAGEMENT/SOCIAL WORK - NSDCPEFALRISK_GEN_ALL_CORE
For information on Fall & Injury Prevention, visit: [email or web address] OR  [email or web address] OR  https://www.cdc.gov/steadi/patient.html

## 2024-04-29 NOTE — DISCHARGE NOTE PROVIDER - CARE PROVIDER_API CALL
Gordon Roe  Neurosurgery  [address], [address]  [place] [address]  Phone: [phone]  Fax: [phone]  Follow Up Time:    Gordon Roe  Neurosurgery  [address], [address]  [place] [address]  Phone: [phone]  Fax: [phone]  Follow Up Time:     Sean Urias  Neurology  [address], [address]  [place] [address]  Phone: [phone]  Fax: [phone]  Follow Up Time:

## 2024-04-29 NOTE — DISCHARGE NOTE PROVIDER - PROVIDER TOKENS
PROVIDER:[TOKEN:[[id number]]] PROVIDER:[TOKEN:[[id number]]],PROVIDER:[TOKEN:[[id number]]]

## 2024-04-29 NOTE — PROGRESS NOTE ADULT - SUBJECTIVE AND OBJECTIVE BOX
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 CTH neg, CTA/CTP neg-GEENA stent looks open through entire cavernous-->supraclinoid seg, got emergent MRI w/ no strokes. ARU PRU 9. SBP 110s. Exam: Severe throbbing HA, nausea, Sleepy/photophobic, Ox3, PERRL, EOMI, no facial, no facial numbness, LUE +drift, 4-/5, LLE 3/5 prox, 2/5 distally, RUE/RLE 4+-5/5, +LUE and LLE numbness. (28 Apr 2024 01:19)    Overnight event: no acute event      Vital Signs Last 24 Hrs  T(C): 36.8 (29 Apr 2024 04:30), Max: 36.8 (29 Apr 2024 04:30)  T(F): 98.2 (29 Apr 2024 04:30), Max: 98.2 (29 Apr 2024 04:30)  HR: 64 (29 Apr 2024 08:33) (64 - 90)  BP: 109/69 (29 Apr 2024 08:33) (100/60 - 116/66)  BP(mean): --  RR: 18 (29 Apr 2024 04:30) (18 - 18)  SpO2: 98% (29 Apr 2024 08:33) (96% - 100%)    Parameters below as of 29 Apr 2024 08:33  Patient On (Oxygen Delivery Method): room air                              10.5   15.36 )-----------( 332      ( 29 Apr 2024 06:26 )             33.7    04-29    139  |  107  |  10  ----------------------------<  146<H>  4.2   |  20<L>  |  0.77    Ca    8.9      29 Apr 2024 06:26  Phos  2.7     04-29  Mg     2.2     04-29    TPro  6.5  /  Alb  3.8  /  TBili  0.3  /  DBili  x   /  AST  12  /  ALT  18  /  AlkPhos  52  04-29  PT/INR - ( 27 Apr 2024 21:50 )   PT: 11.3 sec;   INR: 1.03 ratio         PTT - ( 27 Apr 2024 21:50 )  PTT:36.6 sec   Stroke Core Measures      DRAIN OUTPUT:     NEUROIMAGING:     PHYSICAL EXAM:    General: No Acute Distress     Neurological: Awake, alert oriented to person, place and time, Following Commands, PERRL, EOMI, Face Symmetrical, Speech Fluent, Moving all extremities, Muscle Strength normal in all four extremities, No Drift, Sensation to Light Touch Intact    Pulmonary: Clear to Auscultation, No Rales, No Rhonchi, No Wheezes     Cardiovascular: S1, S2, Regular Rate and Rhythm     Gastrointestinal: Soft, Nontender, Nondistended     Incision:     MEDICATIONS:   Antibiotics:    Neuro:  acetaminophen     Tablet .. 650 milliGRAM(s) Oral every 6 hours PRN Temp greater or equal to 38C (100.4F), Mild Pain (1 - 3)  oxyCODONE    IR 5 milliGRAM(s) Oral every 4 hours PRN Moderate Pain (4 - 6)    Anticoagulation:  aspirin  chewable 81 milliGRAM(s) Oral daily  ticagrelor 90 milliGRAM(s) Oral every 12 hours    Cardiology:    Endo:   atorvastatin 40 milliGRAM(s) Oral at bedtime  dexAMETHasone  Injectable 4 milliGRAM(s) IV Push every 12 hours    Pulm:    GI/:  polyethylene glycol 3350 17 Gram(s) Oral daily  senna 2 Tablet(s) Oral at bedtime    Other:  sodium chloride 0.9%. 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 CTH neg, CTA/CTP neg-GEENA stent looks open through entire cavernous-->supraclinoid seg, got emergent MRI w/ no strokes. ARU PRU 9. SBP 110s. Exam: Severe throbbing HA, nausea, Sleepy/photophobic, Ox3, PERRL, EOMI, no facial, no facial numbness, LUE +drift, 4-/5, LLE 3/5 prox, 2/5 distally, RUE/RLE 4+-5/5, +LUE and LLE numbness. (28 Apr 2024 01:19)    Overnight event: no acute event   Neurology was called and patient was treated with benadryl, compazine and Toradol    Vital Signs Last 24 Hrs  T(C): 36.8 (29 Apr 2024 04:30), Max: 36.8 (29 Apr 2024 04:30)  T(F): 98.2 (29 Apr 2024 04:30), Max: 98.2 (29 Apr 2024 04:30)  HR: 64 (29 Apr 2024 08:33) (64 - 90)  BP: 109/69 (29 Apr 2024 08:33) (100/60 - 116/66)  BP(mean): --  RR: 18 (29 Apr 2024 04:30) (18 - 18)  SpO2: 98% (29 Apr 2024 08:33) (96% - 100%)    Parameters below as of 29 Apr 2024 08:33  Patient On (Oxygen Delivery Method): room air                              10.5   15.36 )-----------( 332      ( 29 Apr 2024 06:26 )             33.7    04-29    139  |  107  |  10  ----------------------------<  146<H>  4.2   |  20<L>  |  0.77    Ca    8.9      29 Apr 2024 06:26  Phos  2.7     04-29  Mg     2.2     04-29    TPro  6.5  /  Alb  3.8  /  TBili  0.3  /  DBili  x   /  AST  12  /  ALT  18  /  AlkPhos  52  04-29  PT/INR - ( 27 Apr 2024 21:50 )   PT: 11.3 sec;   INR: 1.03 ratio         PTT - ( 27 Apr 2024 21:50 )  PTT:36.6 sec   Stroke Core Measures      DRAIN OUTPUT:     NEUROIMAGING:     PHYSICAL EXAM:    General: No Acute Distress     Neurological: Awake, alert oriented to person, place and time, Following Commands, PERRL, EOMI, Face Symmetrical, Speech Fluent, Moving all extremities, Muscle Strength 5/5 0n the Rt, LUE 4/5, LLE 4/5 proximally distally 4+/5, Sensation to Light Touch Intact    Pulmonary: Clear to Auscultation, No Rales, No Rhonchi, No Wheezes     Cardiovascular: S1, S2, Regular Rate and Rhythm     Gastrointestinal: Soft, Nontender, Nondistended     Incision:     MEDICATIONS:   Antibiotics:    Neuro:  acetaminophen     Tablet .. 650 milliGRAM(s) Oral every 6 hours PRN Temp greater or equal to 38C (100.4F), Mild Pain (1 - 3)  oxyCODONE    IR 5 milliGRAM(s) Oral every 4 hours PRN Moderate Pain (4 - 6)    Anticoagulation:  aspirin  chewable 81 milliGRAM(s) Oral daily  ticagrelor 90 milliGRAM(s) Oral every 12 hours    Cardiology:    Endo:   atorvastatin 40 milliGRAM(s) Oral at bedtime  dexAMETHasone  Injectable 4 milliGRAM(s) IV Push every 12 hours    Pulm:    GI/:  polyethylene glycol 3350 17 Gram(s) Oral daily  senna 2 Tablet(s) Oral at bedtime    Other:  sodium chloride 0.9%. 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 CTH neg, CTA/CTP neg-GEENA stent looks open through entire cavernous-->supraclinoid seg, got emergent MRI w/ no strokes. ARU PRU 9. SBP 110s. Exam: Severe throbbing HA, nausea, Sleepy/photophobic, Ox3, PERRL, EOMI, no facial, no facial numbness, LUE +drift, 4-/5, LLE 3/5 prox, 2/5 distally, RUE/RLE 4+-5/5, +LUE and LLE numbness. (28 Apr 2024 01:19)    Overnight event: no acute event   Neurology was called and patient was treated with the following cocktail: benadryl, compazine and Toradol    Vital Signs Last 24 Hrs  T(C): 36.8 (29 Apr 2024 04:30), Max: 36.8 (29 Apr 2024 04:30)  T(F): 98.2 (29 Apr 2024 04:30), Max: 98.2 (29 Apr 2024 04:30)  HR: 64 (29 Apr 2024 08:33) (64 - 90)  BP: 109/69 (29 Apr 2024 08:33) (100/60 - 116/66)  BP(mean): --  RR: 18 (29 Apr 2024 04:30) (18 - 18)  SpO2: 98% (29 Apr 2024 08:33) (96% - 100%)    Parameters below as of 29 Apr 2024 08:33  Patient On (Oxygen Delivery Method): room air                              10.5   15.36 )-----------( 332      ( 29 Apr 2024 06:26 )             33.7    04-29    139  |  107  |  10  ----------------------------<  146<H>  4.2   |  20<L>  |  0.77    Ca    8.9      29 Apr 2024 06:26  Phos  2.7     04-29  Mg     2.2     04-29    TPro  6.5  /  Alb  3.8  /  TBili  0.3  /  DBili  x   /  AST  12  /  ALT  18  /  AlkPhos  52  04-29  PT/INR - ( 27 Apr 2024 21:50 )   PT: 11.3 sec;   INR: 1.03 ratio         PTT - ( 27 Apr 2024 21:50 )  PTT:36.6 sec   Stroke Core Measures      DRAIN OUTPUT:     NEUROIMAGING:     PHYSICAL EXAM:    General: No Acute Distress     Neurological: Awake, alert oriented to person, place and time, Following Commands, PERRL, EOMI, Face Symmetrical, Speech Fluent, Moving all extremities, Muscle Strength 5/5 0n the Rt, LUE 4/5, LLE 4/5 proximally distally 4+/5, Sensation to Light Touch Intact    Pulmonary: Clear to Auscultation, No Rales, No Rhonchi, No Wheezes     Cardiovascular: S1, S2, Regular Rate and Rhythm     Gastrointestinal: Soft, Nontender, Nondistended     Incision:     MEDICATIONS:   Antibiotics:    Neuro:  acetaminophen     Tablet .. 650 milliGRAM(s) Oral every 6 hours PRN Temp greater or equal to 38C (100.4F), Mild Pain (1 - 3)  oxyCODONE    IR 5 milliGRAM(s) Oral every 4 hours PRN Moderate Pain (4 - 6)    Anticoagulation:  aspirin  chewable 81 milliGRAM(s) Oral daily  ticagrelor 90 milliGRAM(s) Oral every 12 hours    Cardiology:    Endo:   atorvastatin 40 milliGRAM(s) Oral at bedtime  dexAMETHasone  Injectable 4 milliGRAM(s) IV Push every 12 hours    Pulm:    GI/:  polyethylene glycol 3350 17 Gram(s) Oral daily  senna 2 Tablet(s) Oral at bedtime    Other:  sodium chloride 0.9%. 1000 milliLiter(s) IV Continuous <Continuous>  polyethylene glycol 3350 17 Gram(s) Oral daily  senna 2 Tablet(s) Oral at bedtime

## 2024-04-29 NOTE — DISCHARGE NOTE PROVIDER - NSDCCPCAREPLAN_GEN_ALL_CORE_FT
PRINCIPAL DISCHARGE DIAGNOSIS  Diagnosis: Stroke  Assessment and Plan of Treatment: You were admitted for Lt sided weakness which has improved. Please continue Aspirin and Brillinta for your intracranial stent      SECONDARY DISCHARGE DIAGNOSES  Diagnosis: H/O migraine  Assessment and Plan of Treatment: Please follow up with Dr Urias for your migraines headaches     PRINCIPAL DISCHARGE DIAGNOSIS  Diagnosis: Stroke  Assessment and Plan of Treatment: You were admitted for Lt sided weakness which has improved. Please continue Aspirin and Brilinta for your intracranial stent      SECONDARY DISCHARGE DIAGNOSES  Diagnosis: H/O migraine  Assessment and Plan of Treatment: Please follow up with Dr Urias for your migraines headaches

## 2024-04-30 RX ORDER — CLOPIDOGREL BISULFATE 75 MG/1
75 TABLET, FILM COATED ORAL DAILY
Qty: 90 | Refills: 1 | Status: DISCONTINUED | COMMUNITY
Start: 2024-04-24 | End: 2024-04-30

## 2024-04-30 RX ORDER — CLOPIDOGREL BISULFATE 300 MG/1
300 TABLET, FILM COATED ORAL
Qty: 1 | Refills: 0 | Status: DISCONTINUED | COMMUNITY
Start: 2024-04-24 | End: 2024-04-30

## 2024-04-30 RX ORDER — ASPIRIN 325 MG/1
325 TABLET, FILM COATED ORAL
Qty: 90 | Refills: 1 | Status: DISCONTINUED | COMMUNITY
Start: 2024-04-24 | End: 2024-04-30

## 2024-05-06 RX ORDER — TICAGRELOR 90 MG/1
90 TABLET ORAL
Qty: 60 | Refills: 3 | Status: ACTIVE | COMMUNITY
Start: 2024-05-06 | End: 1900-01-01

## 2024-05-07 LAB
FERRITIN SERPL-MCNC: 25 NG/ML
IRON SATN MFR SERPL: 9 %
IRON SERPL-MCNC: 42 UG/DL
TIBC SERPL-MCNC: 446 UG/DL
TRANSFERRIN SERPL-MCNC: 307 MG/DL
UIBC SERPL-MCNC: 404 UG/DL

## 2024-05-13 ENCOUNTER — APPOINTMENT (OUTPATIENT)
Dept: DERMATOLOGY | Facility: CLINIC | Age: 39
End: 2024-05-13
Payer: MEDICAID

## 2024-05-13 DIAGNOSIS — L70.0 ACNE VULGARIS: ICD-10-CM

## 2024-05-13 DIAGNOSIS — L85.3 XEROSIS CUTIS: ICD-10-CM

## 2024-05-13 DIAGNOSIS — L29.9 PRURITUS, UNSPECIFIED: ICD-10-CM

## 2024-05-13 PROCEDURE — 99214 OFFICE O/P EST MOD 30 MIN: CPT

## 2024-05-13 RX ORDER — CLINDAMYCIN PHOSPHATE 10 MG/ML
1 LOTION TOPICAL
Qty: 1 | Refills: 2 | Status: ACTIVE | COMMUNITY
Start: 2024-05-13 | End: 1900-01-01

## 2024-05-13 NOTE — PHYSICAL EXAM
[Alert] : alert [Oriented x 3] : ~L oriented x 3 [Declined] : declined [FreeTextEntry3] : Focused exam: -xerosis of the trunk, extremities -fine scaly patches bl lower legs and feet -few papules, comedones on the forehead, cheeks, chin  13.77

## 2024-05-13 NOTE — ASSESSMENT
[FreeTextEntry1] : #Generalized pruritus  #Xerosis -chronic, improving but not at tx goal -unlikely to be drug hypersensitivity rxn to brilinta as there is no primary skin lesion or rash on exam -possibly 2/2 anemia (Hgb 10.9, iron studies largely wnl) - would continue to monitor -sarna lotion, liberal emollients  #Acne vulgaris -chronic, flaring -I have discussed the chronic nature and course of this condition -start bp wash daily -start clindamycin 1% lotion bid to affected areas prn flare   RTC PRN

## 2024-05-13 NOTE — HISTORY OF PRESENT ILLNESS
[FreeTextEntry1] : rash follow up [de-identified] : 37yo F presents for follow up, last seen 4/23/24 rash and itch are improving, still itchy on the lower legs, denies itch elsewhere fungal cx was negative CBC with Hgb 10.9, TIBC mildly elevated otherwise iron studies wnl remains on Brilinta applying vaseline for moisturizer recently having acne breakout on the face

## 2024-05-15 ENCOUNTER — APPOINTMENT (OUTPATIENT)
Dept: NEUROSURGERY | Facility: CLINIC | Age: 39
End: 2024-05-15
Payer: COMMERCIAL

## 2024-05-15 VITALS
TEMPERATURE: 98.3 F | BODY MASS INDEX: 27.82 KG/M2 | OXYGEN SATURATION: 97 % | SYSTOLIC BLOOD PRESSURE: 129 MMHG | DIASTOLIC BLOOD PRESSURE: 77 MMHG | HEART RATE: 77 BPM | HEIGHT: 65 IN | WEIGHT: 167 LBS

## 2024-05-15 DIAGNOSIS — Z78.9 OTHER SPECIFIED HEALTH STATUS: ICD-10-CM

## 2024-05-15 DIAGNOSIS — I67.1 CEREBRAL ANEURYSM, NONRUPTURED: ICD-10-CM

## 2024-05-15 PROCEDURE — 99215 OFFICE O/P EST HI 40 MIN: CPT

## 2024-05-15 RX ORDER — TRIAMCINOLONE ACETONIDE 1 MG/G
0.1 OINTMENT TOPICAL
Qty: 1 | Refills: 0 | Status: DISCONTINUED | COMMUNITY
Start: 2024-04-23 | End: 2024-05-15

## 2024-05-15 RX ORDER — HYDROXYZINE HYDROCHLORIDE 25 MG/1
25 TABLET ORAL
Qty: 30 | Refills: 1 | Status: DISCONTINUED | COMMUNITY
Start: 2024-04-23 | End: 2024-05-15

## 2024-05-15 NOTE — REVIEW OF SYSTEMS
[Negative] : Heme/Lymph [As Noted in HPI] : as noted in HPI [de-identified] : headaches, migraines

## 2024-05-15 NOTE — PHYSICAL EXAM
[Person] : oriented to person [Place] : oriented to place [Time] : oriented to time [Motor Tone] : muscle tone was normal in all four extremities [Abnormal Walk] : normal gait

## 2024-05-15 NOTE — ASSESSMENT
ENDOCRINOLOGY FOLLOW UP VISIT  Patient:  Tammy Nevarze  :  1978    PCP:  [facility]    Today's Date:  2021  Reason for Visit:  Follow up on hypothyroidism. Last seen by Dr. Shelton on 2020.      HISTORY OF PRESENT ILLNESS  Tammy Nevarez is a 42 year old female who is here for evaluation of hypothyroidism. Patient was diagnosed with multiple thyroid nodules in , first noticed on physical exam and then confirmed via ultrasound. Biopsy was done and that was negative for malignancy. During this period the thyroid levels were normal. The nodules continued to grow and so the decision was made to have a thyroidectomy in 2017. Over the last three years there has been significant difficulty in controlling his thyroid levels. She has been on various levels of synthroid since the surgery. She believes Cytomel was started in 2017, now off Cytomel.    She is currently taking levothyroxine 150 mcg daily. She takes this in the morning on an empty stomach, waits 2-3 hours to eat or drink. She takes a multivitamin at night.     Multivitamin contains Biotin. No history of use of or exposure to amiodarone, lithium, iodine contrast, head/neck radiation, sea kelp, or iodinated supplements.     Having menstrual cycle every 2 weeks, nails are peeling. Feeling fatigued during the day, napping even though she is sleeping well at night (7-8 hours). Weight is up 5 lbs. Appetite is lower. Always cold, but this not new.     Denies constipation, dry skin, impaired mentation, or depressed mood.    States she always has tremors, not worse. Denies symptoms of over-treatment, including increased appetite, weight loss, palpitations, hyperdefecation, heat intolerance, or unexplained anxiety.    Pathology from the thyroidectomy noted a parathyroid gland inadvertently removed. Patient states she was told that all parathyroid glands were left in place. Postoperative calcium and PTH were normal. Most recent calcium  [FreeTextEntry1] : IMPRESSION: 38F with PMH of migraines, latex allergy p/w intermittent L sided hemiparesis, likely polyarthralgia. No strokes on MRI. s/p ILR placement. CTA with incidental 5mm right supraclinoid ICA aneurysm. No family history of aneurysms or smoking history. 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 s/p JOSE X embo of R superior hypophyseal aneurysm 4/11/24. On ASA 81 and Brilinta 90 BID. [facility]ed [facility] ED 4/27/24 with L numbness/weakness. CTA showed patency of stent, MRI negative for infarct. History of complex migraine that presented similar in the past. Discharged 4/29/24 on ASA 81 and Brilinta 90 BID.   Doing overall neurologically well on post hospital visit. No dermatological issues at this time, rash resolved. Reports persistent pain behind right eye associated with her migraines. Scheduled to follow up with neurology for management of her headaches and complex migraines.    PLAN: For post-embolization of aneurysm, MRA head non con NOVA, MRI head non con in 3 months post-embo - July 2024 Follow up after for review 6 month repeat cerebral angiogram - October 2024 is normal. No perioral or peripheral paraesthesias. Patient states she always has muscle cramps.       REVIEW OF SYSTEMS  A 7-point review of systems was conducted with the patient, which included Constitutional, Eyes, Cardiac, Gastrointestinal, Endocrine, Neurologic and Mental Health systems. Pertinent positives and negatives are addressed in history of present illness, all other systems reviewed are negative.    PAST MEDICAL HISTORY  Past medical history, surgical history, social history, family history, medications and allergies reviewed.      PHYSICAL EXAM  Visit Vitals  /68   Pulse 63   Ht 5' 10\" (1.778 m)   Wt 69 kg   LMP 01/18/2021   BMI 21.83 kg/m²     Constitutional: Resting, well nourished female and NAD. No myxedematous facial features.     Eyes: Anicteric and no injection. No exophthalmos, lid lag or stare. EOMI.     ENT: Mask in place. Voice is not hoarse.    Neck: Supple. Thyroid is surgically absent. No anterior neck masses or tenderness. No cervical lymphadenopathy.    Respiratory: No stridor. Breathing is non-labored.  Cardiac: RRR, normal S1, S2. No murmur, rubs or gallops.   Skin: Warm and dry, normal texture and temperature and no visible rashes. Hair distribution normal. Hair texture is not coarse or thin.   Psych: Appropriate mood and affect.   Neuro: Awake, alert and oriented x3. No upper extremity tremor. Follows commands. Memory intact. Speech normal. Upper extremity DTRs are 2+ bilaterally, lower extremity DTRs 2+ on the left, 1+ on the right.    LAB REVIEW     Ref. Range 5/1/2020 09:34 8/20/2020 11:11 10/13/2020 11:33 12/4/2020 10:42 1/25/2021 10:45   TRIIODOTHYRONINE, FREE Latest Ref Range: 2.2 - 4.0 pg/mL 2.4 1.8 (L) 2.3 2.9 2.5   T4, FREE Latest Ref Range: 0.8 - 1.5 ng/dL 1.2 1.1 1.1 1.3 1.1   TSH Latest Ref Range: 0.350 - 5.000 mcUnits/mL 2.306 8.145 (H) 3.372 0.412 0.240 (L)     Surgical Pathology 2/24/2017 (from [facility])  A.  Pyramidal lobe, biopsy:  -  Small fragent of  normal thyroid parenchyma.    B.  Total thyroid, thyroidectomy:  -  Oncocytic nodular hyperplasia.  -  Mild lymphocytic thyroiditis.  -  Small perithyroidal parathyroid gland.    IMAGING REVIEW  Thyroid US and FNA cytology from 11/12/2015 reviewed in [facility].   Thyroid US and biopsy from 3/21/2016 reviewed in [facility].  Thyroid US from 11/3/2016 reviewed in [facility]    ASSESSMENT/DIAGNOSES/PLAN  1. Postoperative hypothyroidism  Last TSH was low, FT4 and FT3 were normal. She has been taking a multivitamin with biotin, which can falsely lower TSH. The patient complains of fatigue, frequent menstrual cycles, brittle nails, decreased appetite and increased. Will have her hold biotin and recheck TFTs.   - Continue levothyroxine 150 mcg daily  - The following reflect education and counseling points for today: reviewed proper levothyroxine administration, reviewed symptoms of hypothyroidism and reviewed symptoms of hyperthyroidism that could indicate over treatment   - Do not take multivitamin for the next 4 days, hold biotin for 3-4 days before every thyroid lab draw  - Labs: TSH, Free T4 and Free T3 to be completed on Monday  - If TSH is still low, will decrease the levothyroxine  - If patient's symptoms persist, we could retrial Cytomel    Follow up in 6 months.    I spent a total of 35 minutes on the day of the visit.    Portions of this note are brought forward from Dr. Shelton's last note; reviewed and edited by me as appropriate.     Hemalatha Hodge PA-C

## 2024-05-15 NOTE — HISTORY OF PRESENT ILLNESS
[FreeTextEntry1] : ANDREZ HENRY is a 38 year old female with PMH of complex migraines, latex allergy who presented to [facility] on 2/15/24 with intermittent left sided weakness. Neurology consulted, work-up without evidence of strokes, likely polyarthralgia. Started on ASA 81. CTA showed incidental 5mm right supraclinoid ICA aneurysm. Denies known family history of cerebral aneurysms or aneurysm rupture. Not a smoker. 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 On 3/21/24, she underwent diagnostic cerebral angiogram demonstrating a 5.4 x 3.5 x 3.6 mm right superior hypophyseal aneurysm and a small 1.9 mm x 1.7 mm left clinoid segment aneurysm.  On 4/11/24, she underwent JOSE x embo of right superior hypophyseal aneurysm. Post-op MR NOVA showed good intracranial flow. Maintained on aspirin 81mg daily and Brilinta 90mg BID.  On 4/27/24, she presented to [facility] ED with left sided numbness/weakness, headaches. CTA showed patency of stent, MRI negative for infarct. History of complex migraine that presented similar in the past. Discharged 4/29/24 on ASA 81 and Brilinta 90 BID. Instructed to follow up with neurology, Dr. Urias.  Today, patient presents for neurosurgical follow up after her most recent ED visit. Compliant on aspirin and Brilinta. Rash resolved.

## 2024-07-08 ENCOUNTER — APPOINTMENT (OUTPATIENT)
Dept: MRI IMAGING | Facility: HOSPITAL | Age: 39
End: 2024-07-08

## 2024-07-08 ENCOUNTER — OUTPATIENT (OUTPATIENT)
Dept: OUTPATIENT SERVICES | Facility: HOSPITAL | Age: 39
LOS: 1 days | End: 2024-07-08
Payer: COMMERCIAL

## 2024-07-08 ENCOUNTER — RESULT REVIEW (OUTPATIENT)
Age: 39
End: 2024-07-08

## 2024-07-08 DIAGNOSIS — Z33.2 ENCOUNTER FOR ELECTIVE TERMINATION OF PREGNANCY: Chronic | ICD-10-CM

## 2024-07-08 DIAGNOSIS — Z98.51 TUBAL LIGATION STATUS: Chronic | ICD-10-CM

## 2024-07-08 DIAGNOSIS — Z98.890 OTHER SPECIFIED POSTPROCEDURAL STATES: Chronic | ICD-10-CM

## 2024-07-08 DIAGNOSIS — I67.1 CEREBRAL ANEURYSM, NONRUPTURED: ICD-10-CM

## 2024-07-08 PROCEDURE — 70544 MR ANGIOGRAPHY HEAD W/O DYE: CPT

## 2024-07-08 PROCEDURE — 70544 MR ANGIOGRAPHY HEAD W/O DYE: CPT | Mod: 26,59

## 2024-07-08 PROCEDURE — 70551 MRI BRAIN STEM W/O DYE: CPT | Mod: 26

## 2024-07-08 PROCEDURE — 70551 MRI BRAIN STEM W/O DYE: CPT

## 2024-07-17 ENCOUNTER — APPOINTMENT (OUTPATIENT)
Dept: NEUROSURGERY | Facility: CLINIC | Age: 39
End: 2024-07-17
Payer: COMMERCIAL

## 2024-07-17 VITALS
SYSTOLIC BLOOD PRESSURE: 111 MMHG | BODY MASS INDEX: 27.82 KG/M2 | OXYGEN SATURATION: 98 % | HEART RATE: 59 BPM | DIASTOLIC BLOOD PRESSURE: 77 MMHG | WEIGHT: 167 LBS | HEIGHT: 65 IN

## 2024-07-17 DIAGNOSIS — I67.1 CEREBRAL ANEURYSM, NONRUPTURED: ICD-10-CM

## 2024-07-17 PROCEDURE — 99215 OFFICE O/P EST HI 40 MIN: CPT

## 2024-09-12 NOTE — OCCUPATIONAL THERAPY INITIAL EVALUATION ADULT - NS ASR FOLLOW COMMAND OT EVAL
[FreeTextEntry1] : Documented by Malik Chun acting as a scribe for Dr. Adam Blandon on 09/12/2024. All medical record entries made by the Scribe were at my, Dr. Adam Blandon's, direction and personally dictated by me on 09/12/2024. I have reviewed the chart and agree that the record accurately reflects my personal performance of the history, physical exam, assessment and plan. I have also personally directed, reviewed, and agree with the discharge instructions.
100% of the time

## 2024-09-18 RX ORDER — ASPIRIN 81 MG/1
81 TABLET, CHEWABLE ORAL DAILY
Qty: 90 | Refills: 3 | Status: ACTIVE | COMMUNITY
Start: 2024-09-18 | End: 1900-01-01

## 2025-02-03 NOTE — ED PROVIDER NOTE - PHYSICAL EXAMINATION
normal... GEN: Patient awake alert NAD.   HEENT: normocephalic, atraumatic, moist MM  CARDIAC: RRR, S1, S2, no murmur.   PULM: CTA B/L no wheeze, rhonchi, rales.   ABD: soft NT, ND, no rebound no guarding  MSK: Moving all extremities, no edema.    NEURO: A&Ox3, no focal neurological deficits  SKIN: warm, dry, no rash.

## 2025-06-11 NOTE — PATIENT PROFILE ADULT - INTERNATIONAL TRAVEL
Infusion Nursing Note:  Jennifer Cervantes presents today for IV fluids and pain medications.    Patient seen by provider today: No   present during visit today: Not Applicable.    Note:   Pt reports feeling well today and denies pain, fever/chills, bleeding, nausea/vomiting/diarrhea or respiratory symptoms. See assessment flowsheet for details.    Pt received LR at 500 mL/hr for 2 hours.    Pt received zofran 8 mg IV, benadryl 50 mg po, toradol 30 mg IV. Pt received 1 mg dilaudid every hour for a total of 3 doses (3 mg dilaudid total).      Intravenous Access:  Implanted Port.    Treatment Conditions:  Not Applicable.      Post Infusion Assessment:  Patient tolerated infusion without incident.  Blood return noted pre and post infusion.  Site patent and intact, free from redness, edema or discomfort.  No evidence of extravasations.  Access discontinued per protocol.       Discharge Plan:   Discharge instructions reviewed with: Patient.  Patient and/or family verbalized understanding of discharge instructions and all questions answered.  Patient discharged in stable condition accompanied by: self.  Departure Mode: Ambulatory.      Jamaica Napoles RN    
No